# Patient Record
Sex: FEMALE | Race: WHITE | NOT HISPANIC OR LATINO
[De-identification: names, ages, dates, MRNs, and addresses within clinical notes are randomized per-mention and may not be internally consistent; named-entity substitution may affect disease eponyms.]

---

## 2021-04-14 ENCOUNTER — LABORATORY RESULT (OUTPATIENT)
Age: 76
End: 2021-04-14

## 2021-04-15 ENCOUNTER — TRANSCRIPTION ENCOUNTER (OUTPATIENT)
Age: 76
End: 2021-04-15

## 2021-04-15 ENCOUNTER — APPOINTMENT (OUTPATIENT)
Dept: DERMATOLOGY | Facility: CLINIC | Age: 76
End: 2021-04-15
Payer: MEDICARE

## 2021-04-15 ENCOUNTER — NON-APPOINTMENT (OUTPATIENT)
Age: 76
End: 2021-04-15

## 2021-04-15 VITALS — WEIGHT: 147 LBS | HEIGHT: 61 IN | BODY MASS INDEX: 27.75 KG/M2

## 2021-04-15 PROCEDURE — D0134: CPT

## 2021-04-15 PROCEDURE — 11102 TANGNTL BX SKIN SINGLE LES: CPT

## 2021-04-15 PROCEDURE — 99203 OFFICE O/P NEW LOW 30 MIN: CPT | Mod: 25

## 2021-04-29 ENCOUNTER — NON-APPOINTMENT (OUTPATIENT)
Age: 76
End: 2021-04-29

## 2021-05-10 ENCOUNTER — APPOINTMENT (OUTPATIENT)
Dept: DERMATOLOGY | Facility: CLINIC | Age: 76
End: 2021-05-10
Payer: MEDICARE

## 2021-05-10 PROCEDURE — 17110 DESTRUCTION B9 LES UP TO 14: CPT

## 2021-05-10 PROCEDURE — 99213 OFFICE O/P EST LOW 20 MIN: CPT | Mod: 25

## 2021-05-13 ENCOUNTER — APPOINTMENT (OUTPATIENT)
Dept: DERMATOLOGY | Facility: CLINIC | Age: 76
End: 2021-05-13

## 2022-01-01 ENCOUNTER — NON-APPOINTMENT (OUTPATIENT)
Age: 77
End: 2022-01-01

## 2022-01-01 ENCOUNTER — APPOINTMENT (OUTPATIENT)
Dept: ORTHOPEDIC SURGERY | Facility: CLINIC | Age: 77
End: 2022-01-01

## 2022-01-01 DIAGNOSIS — M47.26 OTHER SPONDYLOSIS WITH RADICULOPATHY, LUMBAR REGION: ICD-10-CM

## 2022-01-01 PROCEDURE — 72100 X-RAY EXAM L-S SPINE 2/3 VWS: CPT

## 2022-01-01 PROCEDURE — 99204 OFFICE O/P NEW MOD 45 MIN: CPT

## 2022-11-23 PROBLEM — M47.26 OSTEOARTHRITIS OF SPINE WITH RADICULOPATHY, LUMBAR REGION: Status: ACTIVE | Noted: 2022-01-01

## 2023-01-01 ENCOUNTER — RESULT REVIEW (OUTPATIENT)
Age: 78
End: 2023-01-01

## 2023-01-01 ENCOUNTER — APPOINTMENT (OUTPATIENT)
Dept: DERMATOLOGY | Facility: CLINIC | Age: 78
End: 2023-01-01

## 2023-01-01 ENCOUNTER — NON-APPOINTMENT (OUTPATIENT)
Age: 78
End: 2023-01-01

## 2023-01-01 ENCOUNTER — APPOINTMENT (OUTPATIENT)
Dept: INFUSION THERAPY | Facility: HOSPITAL | Age: 78
End: 2023-01-01

## 2023-01-01 ENCOUNTER — TRANSCRIPTION ENCOUNTER (OUTPATIENT)
Age: 78
End: 2023-01-01

## 2023-01-01 ENCOUNTER — INPATIENT (INPATIENT)
Facility: HOSPITAL | Age: 78
LOS: 2 days | Discharge: HOSPICE | DRG: 640 | End: 2023-10-06
Attending: STUDENT IN AN ORGANIZED HEALTH CARE EDUCATION/TRAINING PROGRAM | Admitting: HOSPITALIST
Payer: MEDICARE

## 2023-01-01 ENCOUNTER — APPOINTMENT (OUTPATIENT)
Dept: CT IMAGING | Facility: CLINIC | Age: 78
End: 2023-01-01

## 2023-01-01 ENCOUNTER — APPOINTMENT (OUTPATIENT)
Dept: OTOLARYNGOLOGY | Facility: CLINIC | Age: 78
End: 2023-01-01

## 2023-01-01 ENCOUNTER — APPOINTMENT (OUTPATIENT)
Dept: ULTRASOUND IMAGING | Facility: CLINIC | Age: 78
End: 2023-01-01
Payer: MEDICARE

## 2023-01-01 ENCOUNTER — APPOINTMENT (OUTPATIENT)
Dept: DERMATOLOGY | Facility: CLINIC | Age: 78
End: 2023-01-01
Payer: MEDICARE

## 2023-01-01 ENCOUNTER — APPOINTMENT (OUTPATIENT)
Dept: GASTROENTEROLOGY | Facility: HOSPITAL | Age: 78
End: 2023-01-01

## 2023-01-01 ENCOUNTER — INPATIENT (INPATIENT)
Facility: HOSPITAL | Age: 78
LOS: 7 days | Discharge: HOME HEALTH | DRG: 435 | End: 2023-09-28
Attending: STUDENT IN AN ORGANIZED HEALTH CARE EDUCATION/TRAINING PROGRAM | Admitting: STUDENT IN AN ORGANIZED HEALTH CARE EDUCATION/TRAINING PROGRAM
Payer: MEDICARE

## 2023-01-01 ENCOUNTER — APPOINTMENT (OUTPATIENT)
Dept: MULTI SPECIALTY CLINIC | Facility: CLINIC | Age: 78
End: 2023-01-01
Payer: MEDICARE

## 2023-01-01 ENCOUNTER — OUTPATIENT (OUTPATIENT)
Dept: OUTPATIENT SERVICES | Facility: HOSPITAL | Age: 78
LOS: 1 days | End: 2023-01-01
Payer: MEDICARE

## 2023-01-01 ENCOUNTER — APPOINTMENT (OUTPATIENT)
Dept: HEMATOLOGY ONCOLOGY | Facility: CLINIC | Age: 78
End: 2023-01-01
Payer: MEDICARE

## 2023-01-01 ENCOUNTER — OUTPATIENT (OUTPATIENT)
Dept: OUTPATIENT SERVICES | Facility: HOSPITAL | Age: 78
LOS: 1 days | Discharge: HOME | End: 2023-01-01
Payer: MEDICARE

## 2023-01-01 ENCOUNTER — APPOINTMENT (OUTPATIENT)
Dept: PAIN MANAGEMENT | Facility: CLINIC | Age: 78
End: 2023-01-01
Payer: MEDICARE

## 2023-01-01 ENCOUNTER — APPOINTMENT (OUTPATIENT)
Dept: RADIATION ONCOLOGY | Facility: CLINIC | Age: 78
End: 2023-01-01

## 2023-01-01 VITALS
HEIGHT: 62 IN | HEART RATE: 63 BPM | SYSTOLIC BLOOD PRESSURE: 157 MMHG | TEMPERATURE: 97 F | OXYGEN SATURATION: 96 % | RESPIRATION RATE: 18 BRPM | WEIGHT: 121.03 LBS | DIASTOLIC BLOOD PRESSURE: 76 MMHG

## 2023-01-01 VITALS
SYSTOLIC BLOOD PRESSURE: 107 MMHG | HEART RATE: 72 BPM | DIASTOLIC BLOOD PRESSURE: 73 MMHG | RESPIRATION RATE: 16 BRPM | BODY MASS INDEX: 22.93 KG/M2 | OXYGEN SATURATION: 96 % | WEIGHT: 121.46 LBS | TEMPERATURE: 97.2 F

## 2023-01-01 VITALS
BODY MASS INDEX: 24.43 KG/M2 | SYSTOLIC BLOOD PRESSURE: 107 MMHG | DIASTOLIC BLOOD PRESSURE: 61 MMHG | OXYGEN SATURATION: 98 % | HEART RATE: 67 BPM | RESPIRATION RATE: 16 BRPM | TEMPERATURE: 98.4 F | WEIGHT: 129.41 LBS | HEIGHT: 61.02 IN

## 2023-01-01 VITALS
OXYGEN SATURATION: 96 % | HEART RATE: 56 BPM | TEMPERATURE: 96 F | SYSTOLIC BLOOD PRESSURE: 128 MMHG | DIASTOLIC BLOOD PRESSURE: 60 MMHG | WEIGHT: 128.09 LBS | HEIGHT: 62 IN | RESPIRATION RATE: 26 BRPM

## 2023-01-01 VITALS
SYSTOLIC BLOOD PRESSURE: 163 MMHG | RESPIRATION RATE: 18 BRPM | DIASTOLIC BLOOD PRESSURE: 70 MMHG | HEART RATE: 57 BPM | TEMPERATURE: 99 F | OXYGEN SATURATION: 95 %

## 2023-01-01 VITALS
RESPIRATION RATE: 18 BRPM | TEMPERATURE: 97 F | DIASTOLIC BLOOD PRESSURE: 66 MMHG | HEART RATE: 59 BPM | OXYGEN SATURATION: 95 % | SYSTOLIC BLOOD PRESSURE: 142 MMHG

## 2023-01-01 VITALS
WEIGHT: 125 LBS | RESPIRATION RATE: 18 BRPM | TEMPERATURE: 98 F | OXYGEN SATURATION: 96 % | HEART RATE: 96 BPM | SYSTOLIC BLOOD PRESSURE: 124 MMHG | HEIGHT: 62 IN | DIASTOLIC BLOOD PRESSURE: 50 MMHG

## 2023-01-01 VITALS
HEART RATE: 54 BPM | RESPIRATION RATE: 18 BRPM | SYSTOLIC BLOOD PRESSURE: 121 MMHG | OXYGEN SATURATION: 97 % | DIASTOLIC BLOOD PRESSURE: 71 MMHG

## 2023-01-01 DIAGNOSIS — C25.9 MALIGNANT NEOPLASM OF PANCREAS, UNSPECIFIED: ICD-10-CM

## 2023-01-01 DIAGNOSIS — C79.51 SECONDARY MALIGNANT NEOPLASM OF BONE: ICD-10-CM

## 2023-01-01 DIAGNOSIS — Z86.79 PERSONAL HISTORY OF OTHER DISEASES OF THE CIRCULATORY SYSTEM: ICD-10-CM

## 2023-01-01 DIAGNOSIS — Z87.891 PERSONAL HISTORY OF NICOTINE DEPENDENCE: ICD-10-CM

## 2023-01-01 DIAGNOSIS — I82.409 ACUTE EMBOLISM AND THROMBOSIS OF UNSPECIFIED DEEP VEINS OF UNSPECIFIED LOWER EXTREMITY: ICD-10-CM

## 2023-01-01 DIAGNOSIS — K86.89 OTHER SPECIFIED DISEASES OF PANCREAS: ICD-10-CM

## 2023-01-01 DIAGNOSIS — Z80.1 FAMILY HISTORY OF MALIGNANT NEOPLASM OF TRACHEA, BRONCHUS AND LUNG: ICD-10-CM

## 2023-01-01 DIAGNOSIS — R10.9 UNSPECIFIED ABDOMINAL PAIN: ICD-10-CM

## 2023-01-01 DIAGNOSIS — E11.9 TYPE 2 DIABETES MELLITUS WITHOUT COMPLICATIONS: ICD-10-CM

## 2023-01-01 DIAGNOSIS — E83.52 HYPERCALCEMIA: ICD-10-CM

## 2023-01-01 DIAGNOSIS — Z86.59 PERSONAL HISTORY OF OTHER MENTAL AND BEHAVIORAL DISORDERS: ICD-10-CM

## 2023-01-01 DIAGNOSIS — L85.3 XEROSIS CUTIS: ICD-10-CM

## 2023-01-01 DIAGNOSIS — G89.3 NEOPLASM RELATED PAIN (ACUTE) (CHRONIC): ICD-10-CM

## 2023-01-01 DIAGNOSIS — K26.9 DUODENAL ULCER, UNSPECIFIED AS ACUTE OR CHRONIC, W/OUT HEMORRHAGE OR PERFORATION: ICD-10-CM

## 2023-01-01 DIAGNOSIS — R73.9 HYPERGLYCEMIA, UNSPECIFIED: ICD-10-CM

## 2023-01-01 DIAGNOSIS — J18.9 PNEUMONIA, UNSPECIFIED ORGANISM: ICD-10-CM

## 2023-01-01 DIAGNOSIS — Z29.9 ENCOUNTER FOR PROPHYLACTIC MEASURES, UNSPECIFIED: ICD-10-CM

## 2023-01-01 DIAGNOSIS — E86.0 DEHYDRATION: ICD-10-CM

## 2023-01-01 DIAGNOSIS — L30.9 DERMATITIS, UNSPECIFIED: ICD-10-CM

## 2023-01-01 DIAGNOSIS — R11.2 NAUSEA WITH VOMITING, UNSPECIFIED: ICD-10-CM

## 2023-01-01 DIAGNOSIS — R79.89 OTHER SPECIFIED ABNORMAL FINDINGS OF BLOOD CHEMISTRY: ICD-10-CM

## 2023-01-01 DIAGNOSIS — D64.9 ANEMIA, UNSPECIFIED: ICD-10-CM

## 2023-01-01 DIAGNOSIS — F41.9 ANXIETY DISORDER, UNSPECIFIED: ICD-10-CM

## 2023-01-01 DIAGNOSIS — I10 ESSENTIAL (PRIMARY) HYPERTENSION: ICD-10-CM

## 2023-01-01 DIAGNOSIS — R53.2 FUNCTIONAL QUADRIPLEGIA: ICD-10-CM

## 2023-01-01 DIAGNOSIS — Z00.00 ENCOUNTER FOR GENERAL ADULT MEDICAL EXAMINATION WITHOUT ABNORMAL FINDINGS: ICD-10-CM

## 2023-01-01 DIAGNOSIS — R52 PAIN, UNSPECIFIED: ICD-10-CM

## 2023-01-01 DIAGNOSIS — Z86.39 PERSONAL HISTORY OF OTHER ENDOCRINE, NUTRITIONAL AND METABOLIC DISEASE: ICD-10-CM

## 2023-01-01 DIAGNOSIS — L82.1 OTHER SEBORRHEIC KERATOSIS: ICD-10-CM

## 2023-01-01 DIAGNOSIS — Z71.89 OTHER SPECIFIED COUNSELING: ICD-10-CM

## 2023-01-01 DIAGNOSIS — L91.8 OTHER HYPERTROPHIC DISORDERS OF THE SKIN: ICD-10-CM

## 2023-01-01 DIAGNOSIS — Z51.5 ENCOUNTER FOR PALLIATIVE CARE: ICD-10-CM

## 2023-01-01 DIAGNOSIS — Z78.9 OTHER SPECIFIED HEALTH STATUS: ICD-10-CM

## 2023-01-01 LAB
A1C WITH ESTIMATED AVERAGE GLUCOSE RESULT: 7.7 % — HIGH (ref 4–5.6)
ALBUMIN SERPL ELPH-MCNC: 2.6 G/DL — LOW (ref 3.3–5)
ALBUMIN SERPL ELPH-MCNC: 3 G/DL — LOW (ref 3.3–5)
ALBUMIN SERPL ELPH-MCNC: 3 G/DL — LOW (ref 3.3–5)
ALBUMIN SERPL ELPH-MCNC: 3.1 G/DL — LOW (ref 3.3–5)
ALBUMIN SERPL ELPH-MCNC: 3.4 G/DL — SIGNIFICANT CHANGE UP (ref 3.3–5)
ALBUMIN SERPL ELPH-MCNC: 3.7 G/DL — SIGNIFICANT CHANGE UP (ref 3.3–5)
ALBUMIN SERPL ELPH-MCNC: 4.4 G/DL
ALP BLD-CCNC: 570 U/L
ALP SERPL-CCNC: 259 U/L — HIGH (ref 40–120)
ALP SERPL-CCNC: 271 U/L — HIGH (ref 40–120)
ALP SERPL-CCNC: 274 U/L — HIGH (ref 40–120)
ALP SERPL-CCNC: 342 U/L — HIGH (ref 40–120)
ALP SERPL-CCNC: 363 U/L — HIGH (ref 40–120)
ALP SERPL-CCNC: 376 U/L — HIGH (ref 40–120)
ALP SERPL-CCNC: 392 U/L — HIGH (ref 40–120)
ALP SERPL-CCNC: 435 U/L — HIGH (ref 40–120)
ALP SERPL-CCNC: 498 U/L — HIGH (ref 40–120)
ALT FLD-CCNC: 159 U/L — HIGH (ref 10–45)
ALT FLD-CCNC: 213 U/L — HIGH (ref 10–45)
ALT FLD-CCNC: 216 U/L — HIGH (ref 10–45)
ALT FLD-CCNC: 245 U/L — HIGH (ref 10–45)
ALT FLD-CCNC: 308 U/L — HIGH (ref 10–45)
ALT FLD-CCNC: 355 U/L — HIGH (ref 10–45)
ALT FLD-CCNC: 73 U/L — HIGH (ref 10–45)
ALT FLD-CCNC: 75 U/L — HIGH (ref 10–45)
ALT FLD-CCNC: 92 U/L — HIGH (ref 10–45)
ALT SERPL-CCNC: 628 U/L
ANION GAP SERPL CALC-SCNC: 10 MMOL/L — SIGNIFICANT CHANGE UP (ref 5–17)
ANION GAP SERPL CALC-SCNC: 11 MMOL/L — SIGNIFICANT CHANGE UP (ref 5–17)
ANION GAP SERPL CALC-SCNC: 11 MMOL/L — SIGNIFICANT CHANGE UP (ref 5–17)
ANION GAP SERPL CALC-SCNC: 12 MMOL/L — SIGNIFICANT CHANGE UP (ref 5–17)
ANION GAP SERPL CALC-SCNC: 12 MMOL/L — SIGNIFICANT CHANGE UP (ref 5–17)
ANION GAP SERPL CALC-SCNC: 14 MMOL/L
ANION GAP SERPL CALC-SCNC: 14 MMOL/L — SIGNIFICANT CHANGE UP (ref 5–17)
ANION GAP SERPL CALC-SCNC: 14 MMOL/L — SIGNIFICANT CHANGE UP (ref 5–17)
ANION GAP SERPL CALC-SCNC: 8 MMOL/L — SIGNIFICANT CHANGE UP (ref 5–17)
ANION GAP SERPL CALC-SCNC: 9 MMOL/L — SIGNIFICANT CHANGE UP (ref 5–17)
ANION GAP SERPL CALC-SCNC: 9 MMOL/L — SIGNIFICANT CHANGE UP (ref 5–17)
APTT BLD: 25.5 SEC — SIGNIFICANT CHANGE UP (ref 24.5–35.6)
APTT BLD: 32.7 SEC
APTT BLD: 35.4 SEC — SIGNIFICANT CHANGE UP (ref 24.5–35.6)
AST SERPL-CCNC: 109 U/L — HIGH (ref 10–40)
AST SERPL-CCNC: 110 U/L — HIGH (ref 10–40)
AST SERPL-CCNC: 147 U/L — HIGH (ref 10–40)
AST SERPL-CCNC: 163 U/L — HIGH (ref 10–40)
AST SERPL-CCNC: 327 U/L
AST SERPL-CCNC: 47 U/L — HIGH (ref 10–40)
AST SERPL-CCNC: 48 U/L — HIGH (ref 10–40)
AST SERPL-CCNC: 56 U/L — HIGH (ref 10–40)
AST SERPL-CCNC: 64 U/L — HIGH (ref 10–40)
AST SERPL-CCNC: 94 U/L — HIGH (ref 10–40)
BASOPHILS # BLD AUTO: 0 K/UL — SIGNIFICANT CHANGE UP (ref 0–0.2)
BASOPHILS # BLD AUTO: 0.03 K/UL — SIGNIFICANT CHANGE UP (ref 0–0.2)
BASOPHILS # BLD AUTO: 0.06 K/UL — SIGNIFICANT CHANGE UP (ref 0–0.2)
BASOPHILS # BLD AUTO: 0.08 K/UL — SIGNIFICANT CHANGE UP (ref 0–0.2)
BASOPHILS # BLD AUTO: 0.11 K/UL — SIGNIFICANT CHANGE UP (ref 0–0.2)
BASOPHILS # BLD AUTO: 0.13 K/UL — SIGNIFICANT CHANGE UP (ref 0–0.2)
BASOPHILS NFR BLD AUTO: 0 % — SIGNIFICANT CHANGE UP (ref 0–2)
BASOPHILS NFR BLD AUTO: 0.1 % — SIGNIFICANT CHANGE UP (ref 0–2)
BASOPHILS NFR BLD AUTO: 0.3 % — SIGNIFICANT CHANGE UP (ref 0–2)
BASOPHILS NFR BLD AUTO: 0.4 % — SIGNIFICANT CHANGE UP (ref 0–2)
BASOPHILS NFR BLD AUTO: 0.8 % — SIGNIFICANT CHANGE UP (ref 0–2)
BASOPHILS NFR BLD AUTO: 1 % — SIGNIFICANT CHANGE UP (ref 0–2)
BILIRUB SERPL-MCNC: 1.1 MG/DL
BILIRUB SERPL-MCNC: 2 MG/DL — HIGH (ref 0.2–1.2)
BILIRUB SERPL-MCNC: 2 MG/DL — HIGH (ref 0.2–1.2)
BILIRUB SERPL-MCNC: 2.3 MG/DL — HIGH (ref 0.2–1.2)
BILIRUB SERPL-MCNC: 2.8 MG/DL — HIGH (ref 0.2–1.2)
BILIRUB SERPL-MCNC: 4.3 MG/DL — HIGH (ref 0.2–1.2)
BILIRUB SERPL-MCNC: 4.4 MG/DL — HIGH (ref 0.2–1.2)
BILIRUB SERPL-MCNC: 4.5 MG/DL — HIGH (ref 0.2–1.2)
BILIRUB SERPL-MCNC: 5.1 MG/DL — HIGH (ref 0.2–1.2)
BILIRUB SERPL-MCNC: 5.5 MG/DL — HIGH (ref 0.2–1.2)
BLD GP AB SCN SERPL QL: NEGATIVE — SIGNIFICANT CHANGE UP
BLD GP AB SCN SERPL QL: NEGATIVE — SIGNIFICANT CHANGE UP
BUN SERPL-MCNC: 13 MG/DL — SIGNIFICANT CHANGE UP (ref 7–23)
BUN SERPL-MCNC: 14 MG/DL — SIGNIFICANT CHANGE UP (ref 7–23)
BUN SERPL-MCNC: 15 MG/DL — SIGNIFICANT CHANGE UP (ref 7–23)
BUN SERPL-MCNC: 16 MG/DL
BUN SERPL-MCNC: 16 MG/DL — SIGNIFICANT CHANGE UP (ref 7–23)
BUN SERPL-MCNC: 19 MG/DL — SIGNIFICANT CHANGE UP (ref 7–23)
BUN SERPL-MCNC: 21 MG/DL — SIGNIFICANT CHANGE UP (ref 7–23)
BUN SERPL-MCNC: 22 MG/DL — SIGNIFICANT CHANGE UP (ref 7–23)
BUN SERPL-MCNC: 24 MG/DL — HIGH (ref 7–23)
BUN SERPL-MCNC: 25 MG/DL — HIGH (ref 7–23)
BUN SERPL-MCNC: 26 MG/DL — HIGH (ref 7–23)
BUN SERPL-MCNC: 27 MG/DL — HIGH (ref 7–23)
CA-I BLD-SCNC: 1.72 MMOL/L — CRITICAL HIGH (ref 1.15–1.33)
CA-I BLD-SCNC: 1.78 MMOL/L — CRITICAL HIGH (ref 1.15–1.33)
CA-I BLD-SCNC: 1.99 MMOL/L — CRITICAL HIGH (ref 1.15–1.33)
CA-I BLD-SCNC: 2.32 MMOL/L — CRITICAL HIGH (ref 1.15–1.33)
CALCIUM SERPL-MCNC: 10.2 MG/DL — SIGNIFICANT CHANGE UP (ref 8.4–10.5)
CALCIUM SERPL-MCNC: 10.6 MG/DL — HIGH (ref 8.4–10.5)
CALCIUM SERPL-MCNC: 10.6 MG/DL — HIGH (ref 8.4–10.5)
CALCIUM SERPL-MCNC: 10.9 MG/DL — HIGH (ref 8.4–10.5)
CALCIUM SERPL-MCNC: 11.1 MG/DL
CALCIUM SERPL-MCNC: 11.1 MG/DL — HIGH (ref 8.4–10.5)
CALCIUM SERPL-MCNC: 11.1 MG/DL — HIGH (ref 8.4–10.5)
CALCIUM SERPL-MCNC: 11.6 MG/DL — HIGH (ref 8.4–10.5)
CALCIUM SERPL-MCNC: 11.8 MG/DL — HIGH (ref 8.4–10.5)
CALCIUM SERPL-MCNC: 11.9 MG/DL — HIGH (ref 8.4–10.5)
CALCIUM SERPL-MCNC: 12.3 MG/DL — HIGH (ref 8.4–10.5)
CALCIUM SERPL-MCNC: 12.9 MG/DL — HIGH (ref 8.4–10.5)
CALCIUM SERPL-MCNC: 13.1 MG/DL — CRITICAL HIGH (ref 8.4–10.5)
CALCIUM SERPL-MCNC: 14 MG/DL — CRITICAL HIGH (ref 8.4–10.5)
CALCIUM SERPL-MCNC: 14.9 MG/DL — CRITICAL HIGH (ref 8.4–10.5)
CALCIUM SERPL-MCNC: 15.8 MG/DL — CRITICAL HIGH (ref 8.4–10.5)
CALCIUM SERPL-MCNC: 16.2 MG/DL — CRITICAL HIGH (ref 8.4–10.5)
CALCIUM SERPL-MCNC: 17.1 MG/DL — CRITICAL HIGH (ref 8.4–10.5)
CANCER AG19-9 SERPL-ACNC: 2569 U/ML
CEA SERPL-MCNC: 27.5 NG/ML
CHLORIDE SERPL-SCNC: 100 MMOL/L — SIGNIFICANT CHANGE UP (ref 96–108)
CHLORIDE SERPL-SCNC: 101 MMOL/L — SIGNIFICANT CHANGE UP (ref 96–108)
CHLORIDE SERPL-SCNC: 93 MMOL/L — LOW (ref 96–108)
CHLORIDE SERPL-SCNC: 95 MMOL/L — LOW (ref 96–108)
CHLORIDE SERPL-SCNC: 96 MMOL/L
CHLORIDE SERPL-SCNC: 97 MMOL/L — SIGNIFICANT CHANGE UP (ref 96–108)
CHLORIDE SERPL-SCNC: 97 MMOL/L — SIGNIFICANT CHANGE UP (ref 96–108)
CHLORIDE SERPL-SCNC: 98 MMOL/L — SIGNIFICANT CHANGE UP (ref 96–108)
CHLORIDE SERPL-SCNC: 99 MMOL/L — SIGNIFICANT CHANGE UP (ref 96–108)
CO2 SERPL-SCNC: 21 MMOL/L — LOW (ref 22–31)
CO2 SERPL-SCNC: 22 MMOL/L — SIGNIFICANT CHANGE UP (ref 22–31)
CO2 SERPL-SCNC: 23 MMOL/L — SIGNIFICANT CHANGE UP (ref 22–31)
CO2 SERPL-SCNC: 23 MMOL/L — SIGNIFICANT CHANGE UP (ref 22–31)
CO2 SERPL-SCNC: 24 MMOL/L
CO2 SERPL-SCNC: 24 MMOL/L — SIGNIFICANT CHANGE UP (ref 22–31)
CO2 SERPL-SCNC: 25 MMOL/L — SIGNIFICANT CHANGE UP (ref 22–31)
CO2 SERPL-SCNC: 25 MMOL/L — SIGNIFICANT CHANGE UP (ref 22–31)
CO2 SERPL-SCNC: 26 MMOL/L — SIGNIFICANT CHANGE UP (ref 22–31)
CO2 SERPL-SCNC: 26 MMOL/L — SIGNIFICANT CHANGE UP (ref 22–31)
CO2 SERPL-SCNC: 27 MMOL/L — SIGNIFICANT CHANGE UP (ref 22–31)
CO2 SERPL-SCNC: 27 MMOL/L — SIGNIFICANT CHANGE UP (ref 22–31)
CO2 SERPL-SCNC: 28 MMOL/L — SIGNIFICANT CHANGE UP (ref 22–31)
CO2 SERPL-SCNC: 28 MMOL/L — SIGNIFICANT CHANGE UP (ref 22–31)
CO2 SERPL-SCNC: 30 MMOL/L — SIGNIFICANT CHANGE UP (ref 22–31)
CREAT SERPL-MCNC: 0.5 MG/DL — SIGNIFICANT CHANGE UP (ref 0.5–1.3)
CREAT SERPL-MCNC: 0.54 MG/DL — SIGNIFICANT CHANGE UP (ref 0.5–1.3)
CREAT SERPL-MCNC: 0.59 MG/DL — SIGNIFICANT CHANGE UP (ref 0.5–1.3)
CREAT SERPL-MCNC: 0.62 MG/DL — SIGNIFICANT CHANGE UP (ref 0.5–1.3)
CREAT SERPL-MCNC: 0.63 MG/DL — SIGNIFICANT CHANGE UP (ref 0.5–1.3)
CREAT SERPL-MCNC: 0.66 MG/DL — SIGNIFICANT CHANGE UP (ref 0.5–1.3)
CREAT SERPL-MCNC: 0.67 MG/DL
CREAT SERPL-MCNC: 0.7 MG/DL — SIGNIFICANT CHANGE UP (ref 0.5–1.3)
CREAT SERPL-MCNC: 0.71 MG/DL — SIGNIFICANT CHANGE UP (ref 0.5–1.3)
CREAT SERPL-MCNC: 0.72 MG/DL — SIGNIFICANT CHANGE UP (ref 0.5–1.3)
CREAT SERPL-MCNC: 0.73 MG/DL — SIGNIFICANT CHANGE UP (ref 0.5–1.3)
CREAT SERPL-MCNC: 0.74 MG/DL — SIGNIFICANT CHANGE UP (ref 0.5–1.3)
CREAT SERPL-MCNC: 0.78 MG/DL — SIGNIFICANT CHANGE UP (ref 0.5–1.3)
CREAT SERPL-MCNC: 0.8 MG/DL — SIGNIFICANT CHANGE UP (ref 0.5–1.3)
CREAT SERPL-MCNC: 0.85 MG/DL — SIGNIFICANT CHANGE UP (ref 0.5–1.3)
CREAT SERPL-MCNC: 0.96 MG/DL — SIGNIFICANT CHANGE UP (ref 0.5–1.3)
CULTURE RESULTS: SIGNIFICANT CHANGE UP
CULTURE RESULTS: SIGNIFICANT CHANGE UP
EGFR: 61 ML/MIN/1.73M2 — SIGNIFICANT CHANGE UP
EGFR: 71 ML/MIN/1.73M2 — SIGNIFICANT CHANGE UP
EGFR: 76 ML/MIN/1.73M2 — SIGNIFICANT CHANGE UP
EGFR: 79 ML/MIN/1.73M2 — SIGNIFICANT CHANGE UP
EGFR: 84 ML/MIN/1.73M2 — SIGNIFICANT CHANGE UP
EGFR: 85 ML/MIN/1.73M2 — SIGNIFICANT CHANGE UP
EGFR: 87 ML/MIN/1.73M2 — SIGNIFICANT CHANGE UP
EGFR: 88 ML/MIN/1.73M2 — SIGNIFICANT CHANGE UP
EGFR: 90 ML/MIN/1.73M2 — SIGNIFICANT CHANGE UP
EGFR: 91 ML/MIN/1.73M2
EGFR: 91 ML/MIN/1.73M2 — SIGNIFICANT CHANGE UP
EGFR: 92 ML/MIN/1.73M2 — SIGNIFICANT CHANGE UP
EGFR: 92 ML/MIN/1.73M2 — SIGNIFICANT CHANGE UP
EGFR: 93 ML/MIN/1.73M2 — SIGNIFICANT CHANGE UP
EGFR: 95 ML/MIN/1.73M2 — SIGNIFICANT CHANGE UP
EGFR: 97 ML/MIN/1.73M2 — SIGNIFICANT CHANGE UP
EOSINOPHIL # BLD AUTO: 0.19 K/UL — SIGNIFICANT CHANGE UP (ref 0–0.5)
EOSINOPHIL # BLD AUTO: 0.19 K/UL — SIGNIFICANT CHANGE UP (ref 0–0.5)
EOSINOPHIL # BLD AUTO: 0.22 K/UL — SIGNIFICANT CHANGE UP (ref 0–0.5)
EOSINOPHIL # BLD AUTO: 0.23 K/UL — SIGNIFICANT CHANGE UP (ref 0–0.5)
EOSINOPHIL # BLD AUTO: 0.35 K/UL — SIGNIFICANT CHANGE UP (ref 0–0.5)
EOSINOPHIL # BLD AUTO: 0.45 K/UL — SIGNIFICANT CHANGE UP (ref 0–0.5)
EOSINOPHIL NFR BLD AUTO: 0.9 % — SIGNIFICANT CHANGE UP (ref 0–6)
EOSINOPHIL NFR BLD AUTO: 0.9 % — SIGNIFICANT CHANGE UP (ref 0–6)
EOSINOPHIL NFR BLD AUTO: 1 % — SIGNIFICANT CHANGE UP (ref 0–6)
EOSINOPHIL NFR BLD AUTO: 1 % — SIGNIFICANT CHANGE UP (ref 0–6)
EOSINOPHIL NFR BLD AUTO: 2.7 % — SIGNIFICANT CHANGE UP (ref 0–6)
EOSINOPHIL NFR BLD AUTO: 3.4 % — SIGNIFICANT CHANGE UP (ref 0–6)
ESTIMATED AVERAGE GLUCOSE: 174 MG/DL — HIGH (ref 68–114)
FERRITIN SERPL-MCNC: 451 NG/ML — HIGH (ref 13–330)
FOLATE SERPL-MCNC: 10.2 NG/ML — SIGNIFICANT CHANGE UP
GLUCOSE BLDC GLUCOMTR-MCNC: 100 MG/DL — HIGH (ref 70–99)
GLUCOSE BLDC GLUCOMTR-MCNC: 106 MG/DL — HIGH (ref 70–99)
GLUCOSE BLDC GLUCOMTR-MCNC: 107 MG/DL — HIGH (ref 70–99)
GLUCOSE BLDC GLUCOMTR-MCNC: 110 MG/DL — HIGH (ref 70–99)
GLUCOSE BLDC GLUCOMTR-MCNC: 110 MG/DL — HIGH (ref 70–99)
GLUCOSE BLDC GLUCOMTR-MCNC: 116 MG/DL — HIGH (ref 70–99)
GLUCOSE BLDC GLUCOMTR-MCNC: 117 MG/DL — HIGH (ref 70–99)
GLUCOSE BLDC GLUCOMTR-MCNC: 119 MG/DL — HIGH (ref 70–99)
GLUCOSE BLDC GLUCOMTR-MCNC: 121 MG/DL — HIGH (ref 70–99)
GLUCOSE BLDC GLUCOMTR-MCNC: 121 MG/DL — HIGH (ref 70–99)
GLUCOSE BLDC GLUCOMTR-MCNC: 125 MG/DL — HIGH (ref 70–99)
GLUCOSE BLDC GLUCOMTR-MCNC: 127 MG/DL — HIGH (ref 70–99)
GLUCOSE BLDC GLUCOMTR-MCNC: 128 MG/DL — HIGH (ref 70–99)
GLUCOSE BLDC GLUCOMTR-MCNC: 128 MG/DL — HIGH (ref 70–99)
GLUCOSE BLDC GLUCOMTR-MCNC: 129 MG/DL — HIGH (ref 70–99)
GLUCOSE BLDC GLUCOMTR-MCNC: 129 MG/DL — HIGH (ref 70–99)
GLUCOSE BLDC GLUCOMTR-MCNC: 133 MG/DL — HIGH (ref 70–99)
GLUCOSE BLDC GLUCOMTR-MCNC: 135 MG/DL — HIGH (ref 70–99)
GLUCOSE BLDC GLUCOMTR-MCNC: 137 MG/DL — HIGH (ref 70–99)
GLUCOSE BLDC GLUCOMTR-MCNC: 142 MG/DL — HIGH (ref 70–99)
GLUCOSE BLDC GLUCOMTR-MCNC: 142 MG/DL — HIGH (ref 70–99)
GLUCOSE BLDC GLUCOMTR-MCNC: 143 MG/DL — HIGH (ref 70–99)
GLUCOSE BLDC GLUCOMTR-MCNC: 148 MG/DL — HIGH (ref 70–99)
GLUCOSE BLDC GLUCOMTR-MCNC: 148 MG/DL — HIGH (ref 70–99)
GLUCOSE BLDC GLUCOMTR-MCNC: 152 MG/DL — HIGH (ref 70–99)
GLUCOSE BLDC GLUCOMTR-MCNC: 165 MG/DL — HIGH (ref 70–99)
GLUCOSE BLDC GLUCOMTR-MCNC: 166 MG/DL — HIGH (ref 70–99)
GLUCOSE BLDC GLUCOMTR-MCNC: 167 MG/DL — HIGH (ref 70–99)
GLUCOSE BLDC GLUCOMTR-MCNC: 169 MG/DL — HIGH (ref 70–99)
GLUCOSE BLDC GLUCOMTR-MCNC: 171 MG/DL — HIGH (ref 70–99)
GLUCOSE BLDC GLUCOMTR-MCNC: 172 MG/DL — HIGH (ref 70–99)
GLUCOSE BLDC GLUCOMTR-MCNC: 174 MG/DL — HIGH (ref 70–99)
GLUCOSE BLDC GLUCOMTR-MCNC: 175 MG/DL — HIGH (ref 70–99)
GLUCOSE BLDC GLUCOMTR-MCNC: 178 MG/DL — HIGH (ref 70–99)
GLUCOSE BLDC GLUCOMTR-MCNC: 179 MG/DL — HIGH (ref 70–99)
GLUCOSE BLDC GLUCOMTR-MCNC: 206 MG/DL — HIGH (ref 70–99)
GLUCOSE BLDC GLUCOMTR-MCNC: 217 MG/DL — HIGH (ref 70–99)
GLUCOSE BLDC GLUCOMTR-MCNC: 87 MG/DL — SIGNIFICANT CHANGE UP (ref 70–99)
GLUCOSE SERPL-MCNC: 102 MG/DL — HIGH (ref 70–99)
GLUCOSE SERPL-MCNC: 104 MG/DL — HIGH (ref 70–99)
GLUCOSE SERPL-MCNC: 106 MG/DL — HIGH (ref 70–99)
GLUCOSE SERPL-MCNC: 109 MG/DL — HIGH (ref 70–99)
GLUCOSE SERPL-MCNC: 110 MG/DL — HIGH (ref 70–99)
GLUCOSE SERPL-MCNC: 113 MG/DL — HIGH (ref 70–99)
GLUCOSE SERPL-MCNC: 120 MG/DL — HIGH (ref 70–99)
GLUCOSE SERPL-MCNC: 123 MG/DL — HIGH (ref 70–99)
GLUCOSE SERPL-MCNC: 126 MG/DL — HIGH (ref 70–99)
GLUCOSE SERPL-MCNC: 127 MG/DL — HIGH (ref 70–99)
GLUCOSE SERPL-MCNC: 134 MG/DL
GLUCOSE SERPL-MCNC: 134 MG/DL — HIGH (ref 70–99)
GLUCOSE SERPL-MCNC: 137 MG/DL — HIGH (ref 70–99)
GLUCOSE SERPL-MCNC: 138 MG/DL — HIGH (ref 70–99)
GLUCOSE SERPL-MCNC: 142 MG/DL — HIGH (ref 70–99)
GLUCOSE SERPL-MCNC: 147 MG/DL — HIGH (ref 70–99)
GLUCOSE SERPL-MCNC: 157 MG/DL — HIGH (ref 70–99)
GLUCOSE SERPL-MCNC: 223 MG/DL — HIGH (ref 70–99)
HAV IGM SER QL: NONREACTIVE
HBV CORE IGG+IGM SER QL: NONREACTIVE
HBV CORE IGM SER QL: NONREACTIVE
HBV SURFACE AB SER QL: NONREACTIVE
HBV SURFACE AG SER QL: NONREACTIVE
HCT VFR BLD CALC: 27.8 % — LOW (ref 34.5–45)
HCT VFR BLD CALC: 28.3 % — LOW (ref 34.5–45)
HCT VFR BLD CALC: 29.1 % — LOW (ref 34.5–45)
HCT VFR BLD CALC: 29.6 % — LOW (ref 34.5–45)
HCT VFR BLD CALC: 29.6 % — LOW (ref 34.5–45)
HCT VFR BLD CALC: 29.9 % — LOW (ref 34.5–45)
HCT VFR BLD CALC: 29.9 % — LOW (ref 34.5–45)
HCT VFR BLD CALC: 30.9 % — LOW (ref 34.5–45)
HCT VFR BLD CALC: 31.4 % — LOW (ref 34.5–45)
HCT VFR BLD CALC: 31.5 % — LOW (ref 34.5–45)
HCT VFR BLD CALC: 32 % — LOW (ref 34.5–45)
HCT VFR BLD CALC: 33.2 % — LOW (ref 34.5–45)
HCT VFR BLD CALC: 34.7 % — SIGNIFICANT CHANGE UP (ref 34.5–45)
HCT VFR BLD CALC: 38.6 % — SIGNIFICANT CHANGE UP (ref 34.5–45)
HCV AB S/CO SERPL IA: 0.07 S/CO — SIGNIFICANT CHANGE UP (ref 0–0.99)
HCV AB SER QL: NONREACTIVE
HCV AB SERPL-IMP: SIGNIFICANT CHANGE UP
HCV S/CO RATIO: 0.08 S/CO
HGB BLD-MCNC: 10 G/DL — LOW (ref 11.5–15.5)
HGB BLD-MCNC: 10.4 G/DL — LOW (ref 11.5–15.5)
HGB BLD-MCNC: 10.5 G/DL — LOW (ref 11.5–15.5)
HGB BLD-MCNC: 10.5 G/DL — LOW (ref 11.5–15.5)
HGB BLD-MCNC: 10.6 G/DL — LOW (ref 11.5–15.5)
HGB BLD-MCNC: 10.9 G/DL — LOW (ref 11.5–15.5)
HGB BLD-MCNC: 11.6 G/DL — SIGNIFICANT CHANGE UP (ref 11.5–15.5)
HGB BLD-MCNC: 13.1 G/DL — SIGNIFICANT CHANGE UP (ref 11.5–15.5)
HGB BLD-MCNC: 9.3 G/DL — LOW (ref 11.5–15.5)
HGB BLD-MCNC: 9.5 G/DL — LOW (ref 11.5–15.5)
HGB BLD-MCNC: 9.6 G/DL — LOW (ref 11.5–15.5)
HGB BLD-MCNC: 9.7 G/DL — LOW (ref 11.5–15.5)
HGB BLD-MCNC: 9.8 G/DL — LOW (ref 11.5–15.5)
HGB BLD-MCNC: 9.9 G/DL — LOW (ref 11.5–15.5)
IMM GRANULOCYTES NFR BLD AUTO: 0.5 % — SIGNIFICANT CHANGE UP (ref 0–0.9)
IMM GRANULOCYTES NFR BLD AUTO: 0.6 % — SIGNIFICANT CHANGE UP (ref 0–0.9)
IMM GRANULOCYTES NFR BLD AUTO: 0.6 % — SIGNIFICANT CHANGE UP (ref 0–0.9)
IMM GRANULOCYTES NFR BLD AUTO: 0.7 % — SIGNIFICANT CHANGE UP (ref 0–0.9)
IMM GRANULOCYTES NFR BLD AUTO: 0.9 % — SIGNIFICANT CHANGE UP (ref 0–0.9)
INR BLD: 1.07 RATIO — SIGNIFICANT CHANGE UP (ref 0.85–1.18)
INR BLD: 1.15 RATIO — SIGNIFICANT CHANGE UP (ref 0.85–1.18)
INR BLD: 1.36 RATIO — HIGH (ref 0.85–1.18)
INR BLD: 1.4 RATIO — HIGH (ref 0.85–1.18)
INR PPP: 0.97 RATIO
IRON SATN MFR SERPL: 13 % — LOW (ref 14–50)
IRON SATN MFR SERPL: 29 UG/DL — LOW (ref 30–160)
LYMPHOCYTES # BLD AUTO: 1.46 K/UL — SIGNIFICANT CHANGE UP (ref 1–3.3)
LYMPHOCYTES # BLD AUTO: 1.47 K/UL — SIGNIFICANT CHANGE UP (ref 1–3.3)
LYMPHOCYTES # BLD AUTO: 1.59 K/UL — SIGNIFICANT CHANGE UP (ref 1–3.3)
LYMPHOCYTES # BLD AUTO: 1.62 K/UL — SIGNIFICANT CHANGE UP (ref 1–3.3)
LYMPHOCYTES # BLD AUTO: 1.82 K/UL — SIGNIFICANT CHANGE UP (ref 1–3.3)
LYMPHOCYTES # BLD AUTO: 13.8 % — SIGNIFICANT CHANGE UP (ref 13–44)
LYMPHOCYTES # BLD AUTO: 2.71 K/UL — SIGNIFICANT CHANGE UP (ref 1–3.3)
LYMPHOCYTES # BLD AUTO: 20.5 % — SIGNIFICANT CHANGE UP (ref 13–44)
LYMPHOCYTES # BLD AUTO: 6.9 % — LOW (ref 13–44)
LYMPHOCYTES # BLD AUTO: 7 % — LOW (ref 13–44)
LYMPHOCYTES # BLD AUTO: 7.2 % — LOW (ref 13–44)
LYMPHOCYTES # BLD AUTO: 7.5 % — LOW (ref 13–44)
MAGNESIUM SERPL-MCNC: 1.5 MG/DL — LOW (ref 1.6–2.6)
MAGNESIUM SERPL-MCNC: 1.6 MG/DL — SIGNIFICANT CHANGE UP (ref 1.6–2.6)
MAGNESIUM SERPL-MCNC: 1.6 MG/DL — SIGNIFICANT CHANGE UP (ref 1.6–2.6)
MAGNESIUM SERPL-MCNC: 1.7 MG/DL — SIGNIFICANT CHANGE UP (ref 1.6–2.6)
MAGNESIUM SERPL-MCNC: 1.7 MG/DL — SIGNIFICANT CHANGE UP (ref 1.6–2.6)
MAGNESIUM SERPL-MCNC: 1.9 MG/DL — SIGNIFICANT CHANGE UP (ref 1.6–2.6)
MANUAL SMEAR VERIFICATION: SIGNIFICANT CHANGE UP
MCHC RBC-ENTMCNC: 28.9 PG — SIGNIFICANT CHANGE UP (ref 27–34)
MCHC RBC-ENTMCNC: 29.2 PG — SIGNIFICANT CHANGE UP (ref 27–34)
MCHC RBC-ENTMCNC: 29.3 PG — SIGNIFICANT CHANGE UP (ref 27–34)
MCHC RBC-ENTMCNC: 29.4 PG — SIGNIFICANT CHANGE UP (ref 27–34)
MCHC RBC-ENTMCNC: 29.4 PG — SIGNIFICANT CHANGE UP (ref 27–34)
MCHC RBC-ENTMCNC: 29.6 PG — SIGNIFICANT CHANGE UP (ref 27–34)
MCHC RBC-ENTMCNC: 29.8 PG — SIGNIFICANT CHANGE UP (ref 27–34)
MCHC RBC-ENTMCNC: 29.9 PG — SIGNIFICANT CHANGE UP (ref 27–34)
MCHC RBC-ENTMCNC: 30 PG — SIGNIFICANT CHANGE UP (ref 27–34)
MCHC RBC-ENTMCNC: 30.1 PG — SIGNIFICANT CHANGE UP (ref 27–34)
MCHC RBC-ENTMCNC: 30.5 PG — SIGNIFICANT CHANGE UP (ref 27–34)
MCHC RBC-ENTMCNC: 32.4 GM/DL — SIGNIFICANT CHANGE UP (ref 32–36)
MCHC RBC-ENTMCNC: 32.8 G/DL — SIGNIFICANT CHANGE UP (ref 32–36)
MCHC RBC-ENTMCNC: 32.8 GM/DL — SIGNIFICANT CHANGE UP (ref 32–36)
MCHC RBC-ENTMCNC: 33 GM/DL — SIGNIFICANT CHANGE UP (ref 32–36)
MCHC RBC-ENTMCNC: 33.1 GM/DL — SIGNIFICANT CHANGE UP (ref 32–36)
MCHC RBC-ENTMCNC: 33.3 G/DL — SIGNIFICANT CHANGE UP (ref 32–36)
MCHC RBC-ENTMCNC: 33.4 GM/DL — SIGNIFICANT CHANGE UP (ref 32–36)
MCHC RBC-ENTMCNC: 33.5 GM/DL — SIGNIFICANT CHANGE UP (ref 32–36)
MCHC RBC-ENTMCNC: 33.6 GM/DL — SIGNIFICANT CHANGE UP (ref 32–36)
MCHC RBC-ENTMCNC: 33.7 GM/DL — SIGNIFICANT CHANGE UP (ref 32–36)
MCHC RBC-ENTMCNC: 33.8 GM/DL — SIGNIFICANT CHANGE UP (ref 32–36)
MCHC RBC-ENTMCNC: 33.9 G/DL — SIGNIFICANT CHANGE UP (ref 32–36)
MCV RBC AUTO: 86.8 FL — SIGNIFICANT CHANGE UP (ref 80–100)
MCV RBC AUTO: 87.6 FL — SIGNIFICANT CHANGE UP (ref 80–100)
MCV RBC AUTO: 88.1 FL — SIGNIFICANT CHANGE UP (ref 80–100)
MCV RBC AUTO: 88.6 FL — SIGNIFICANT CHANGE UP (ref 80–100)
MCV RBC AUTO: 89 FL — SIGNIFICANT CHANGE UP (ref 80–100)
MCV RBC AUTO: 89.2 FL — SIGNIFICANT CHANGE UP (ref 80–100)
MCV RBC AUTO: 89.4 FL — SIGNIFICANT CHANGE UP (ref 80–100)
MCV RBC AUTO: 89.8 FL — SIGNIFICANT CHANGE UP (ref 80–100)
MCV RBC AUTO: 90 FL — SIGNIFICANT CHANGE UP (ref 80–100)
MCV RBC AUTO: 90.3 FL — SIGNIFICANT CHANGE UP (ref 80–100)
MCV RBC AUTO: 90.6 FL — SIGNIFICANT CHANGE UP (ref 80–100)
MCV RBC AUTO: 90.9 FL — SIGNIFICANT CHANGE UP (ref 80–100)
MISCELLANEOUS TEST: NORMAL
MONOCYTES # BLD AUTO: 1.02 K/UL — HIGH (ref 0–0.9)
MONOCYTES # BLD AUTO: 1.08 K/UL — HIGH (ref 0–0.9)
MONOCYTES # BLD AUTO: 1.57 K/UL — HIGH (ref 0–0.9)
MONOCYTES # BLD AUTO: 1.66 K/UL — HIGH (ref 0–0.9)
MONOCYTES # BLD AUTO: 1.68 K/UL — HIGH (ref 0–0.9)
MONOCYTES # BLD AUTO: 2.03 K/UL — HIGH (ref 0–0.9)
MONOCYTES NFR BLD AUTO: 7.3 % — SIGNIFICANT CHANGE UP (ref 2–14)
MONOCYTES NFR BLD AUTO: 7.6 % — SIGNIFICANT CHANGE UP (ref 2–14)
MONOCYTES NFR BLD AUTO: 7.7 % — SIGNIFICANT CHANGE UP (ref 2–14)
MONOCYTES NFR BLD AUTO: 7.9 % — SIGNIFICANT CHANGE UP (ref 2–14)
MONOCYTES NFR BLD AUTO: 8.2 % — SIGNIFICANT CHANGE UP (ref 2–14)
MONOCYTES NFR BLD AUTO: 9.6 % — SIGNIFICANT CHANGE UP (ref 2–14)
MRSA PCR RESULT.: SIGNIFICANT CHANGE UP
NEUTROPHILS # BLD AUTO: 17.45 K/UL — HIGH (ref 1.8–7.4)
NEUTROPHILS # BLD AUTO: 17.45 K/UL — HIGH (ref 1.8–7.4)
NEUTROPHILS # BLD AUTO: 17.91 K/UL — HIGH (ref 1.8–7.4)
NEUTROPHILS # BLD AUTO: 18.35 K/UL — HIGH (ref 1.8–7.4)
NEUTROPHILS # BLD AUTO: 8.79 K/UL — HIGH (ref 1.8–7.4)
NEUTROPHILS # BLD AUTO: 9.81 K/UL — HIGH (ref 1.8–7.4)
NEUTROPHILS NFR BLD AUTO: 66.4 % — SIGNIFICANT CHANGE UP (ref 43–77)
NEUTROPHILS NFR BLD AUTO: 74.3 % — SIGNIFICANT CHANGE UP (ref 43–77)
NEUTROPHILS NFR BLD AUTO: 82.6 % — HIGH (ref 43–77)
NEUTROPHILS NFR BLD AUTO: 83.2 % — HIGH (ref 43–77)
NEUTROPHILS NFR BLD AUTO: 83.2 % — HIGH (ref 43–77)
NEUTROPHILS NFR BLD AUTO: 83.3 % — HIGH (ref 43–77)
NRBC # BLD: 0 /100 WBCS — SIGNIFICANT CHANGE UP (ref 0–0)
PHOSPHATE SERPL-MCNC: 1.9 MG/DL — LOW (ref 2.5–4.5)
PHOSPHATE SERPL-MCNC: 2.1 MG/DL — LOW (ref 2.5–4.5)
PHOSPHATE SERPL-MCNC: 2.2 MG/DL — LOW (ref 2.5–4.5)
PHOSPHATE SERPL-MCNC: 2.4 MG/DL — LOW (ref 2.5–4.5)
PHOSPHATE SERPL-MCNC: 2.4 MG/DL — LOW (ref 2.5–4.5)
PHOSPHATE SERPL-MCNC: 2.6 MG/DL — SIGNIFICANT CHANGE UP (ref 2.5–4.5)
PLAT MORPH BLD: NORMAL — SIGNIFICANT CHANGE UP
PLATELET # BLD AUTO: 218 K/UL — SIGNIFICANT CHANGE UP (ref 150–400)
PLATELET # BLD AUTO: 231 K/UL — SIGNIFICANT CHANGE UP (ref 150–400)
PLATELET # BLD AUTO: 251 K/UL — SIGNIFICANT CHANGE UP (ref 150–400)
PLATELET # BLD AUTO: 277 K/UL — SIGNIFICANT CHANGE UP (ref 150–400)
PLATELET # BLD AUTO: 277 K/UL — SIGNIFICANT CHANGE UP (ref 150–400)
PLATELET # BLD AUTO: 280 K/UL — SIGNIFICANT CHANGE UP (ref 150–400)
PLATELET # BLD AUTO: 284 K/UL — SIGNIFICANT CHANGE UP (ref 150–400)
PLATELET # BLD AUTO: 285 K/UL — SIGNIFICANT CHANGE UP (ref 150–400)
PLATELET # BLD AUTO: 319 K/UL — SIGNIFICANT CHANGE UP (ref 150–400)
PLATELET # BLD AUTO: 325 K/UL — SIGNIFICANT CHANGE UP (ref 150–400)
PLATELET # BLD AUTO: 331 K/UL — SIGNIFICANT CHANGE UP (ref 150–400)
PLATELET # BLD AUTO: 340 K/UL — SIGNIFICANT CHANGE UP (ref 150–400)
PLATELET # BLD AUTO: 340 K/UL — SIGNIFICANT CHANGE UP (ref 150–400)
PLATELET # BLD AUTO: 341 K/UL — SIGNIFICANT CHANGE UP (ref 150–400)
POTASSIUM SERPL-MCNC: 3.2 MMOL/L — LOW (ref 3.5–5.3)
POTASSIUM SERPL-MCNC: 3.3 MMOL/L — LOW (ref 3.5–5.3)
POTASSIUM SERPL-MCNC: 3.4 MMOL/L — LOW (ref 3.5–5.3)
POTASSIUM SERPL-MCNC: 3.5 MMOL/L — SIGNIFICANT CHANGE UP (ref 3.5–5.3)
POTASSIUM SERPL-MCNC: 3.7 MMOL/L — SIGNIFICANT CHANGE UP (ref 3.5–5.3)
POTASSIUM SERPL-MCNC: 3.8 MMOL/L — SIGNIFICANT CHANGE UP (ref 3.5–5.3)
POTASSIUM SERPL-MCNC: 3.9 MMOL/L — SIGNIFICANT CHANGE UP (ref 3.5–5.3)
POTASSIUM SERPL-MCNC: 4.1 MMOL/L — SIGNIFICANT CHANGE UP (ref 3.5–5.3)
POTASSIUM SERPL-MCNC: 4.1 MMOL/L — SIGNIFICANT CHANGE UP (ref 3.5–5.3)
POTASSIUM SERPL-MCNC: 4.4 MMOL/L — SIGNIFICANT CHANGE UP (ref 3.5–5.3)
POTASSIUM SERPL-MCNC: 4.5 MMOL/L — SIGNIFICANT CHANGE UP (ref 3.5–5.3)
POTASSIUM SERPL-SCNC: 3.2 MMOL/L — LOW (ref 3.5–5.3)
POTASSIUM SERPL-SCNC: 3.3 MMOL/L — LOW (ref 3.5–5.3)
POTASSIUM SERPL-SCNC: 3.4 MMOL/L — LOW (ref 3.5–5.3)
POTASSIUM SERPL-SCNC: 3.5 MMOL/L — SIGNIFICANT CHANGE UP (ref 3.5–5.3)
POTASSIUM SERPL-SCNC: 3.7 MMOL/L — SIGNIFICANT CHANGE UP (ref 3.5–5.3)
POTASSIUM SERPL-SCNC: 3.8 MMOL/L — SIGNIFICANT CHANGE UP (ref 3.5–5.3)
POTASSIUM SERPL-SCNC: 3.9 MMOL/L — SIGNIFICANT CHANGE UP (ref 3.5–5.3)
POTASSIUM SERPL-SCNC: 4.1 MMOL/L — SIGNIFICANT CHANGE UP (ref 3.5–5.3)
POTASSIUM SERPL-SCNC: 4.1 MMOL/L — SIGNIFICANT CHANGE UP (ref 3.5–5.3)
POTASSIUM SERPL-SCNC: 4.4 MMOL/L — SIGNIFICANT CHANGE UP (ref 3.5–5.3)
POTASSIUM SERPL-SCNC: 4.5 MMOL/L — SIGNIFICANT CHANGE UP (ref 3.5–5.3)
POTASSIUM SERPL-SCNC: 4.8 MMOL/L
PROC NAME: NORMAL
PROCALCITONIN SERPL-MCNC: 0.09 NG/ML — SIGNIFICANT CHANGE UP (ref 0.02–0.1)
PROT SERPL-MCNC: 5.7 G/DL — LOW (ref 6–8.3)
PROT SERPL-MCNC: 6 G/DL — SIGNIFICANT CHANGE UP (ref 6–8.3)
PROT SERPL-MCNC: 6.2 G/DL — SIGNIFICANT CHANGE UP (ref 6–8.3)
PROT SERPL-MCNC: 6.3 G/DL — SIGNIFICANT CHANGE UP (ref 6–8.3)
PROT SERPL-MCNC: 6.4 G/DL — SIGNIFICANT CHANGE UP (ref 6–8.3)
PROT SERPL-MCNC: 7 G/DL — SIGNIFICANT CHANGE UP (ref 6–8.3)
PROT SERPL-MCNC: 7.2 G/DL — SIGNIFICANT CHANGE UP (ref 6–8.3)
PROT SERPL-MCNC: 7.7 G/DL
PROTHROM AB SERPL-ACNC: 11.7 SEC — SIGNIFICANT CHANGE UP (ref 9.5–13)
PROTHROM AB SERPL-ACNC: 12.6 SEC — SIGNIFICANT CHANGE UP (ref 9.5–13)
PROTHROM AB SERPL-ACNC: 14.2 SEC — HIGH (ref 9.5–13)
PROTHROM AB SERPL-ACNC: 15.3 SEC — HIGH (ref 9.5–13)
PT BLD: 11 SEC
RBC # BLD: 3.11 M/UL — LOW (ref 3.8–5.2)
RBC # BLD: 3.2 M/UL — LOW (ref 3.8–5.2)
RBC # BLD: 3.23 M/UL — LOW (ref 3.8–5.2)
RBC # BLD: 3.31 M/UL — LOW (ref 3.8–5.2)
RBC # BLD: 3.31 M/UL — LOW (ref 3.8–5.2)
RBC # BLD: 3.33 M/UL — LOW (ref 3.8–5.2)
RBC # BLD: 3.34 M/UL — LOW (ref 3.8–5.2)
RBC # BLD: 3.41 M/UL — LOW (ref 3.8–5.2)
RBC # BLD: 3.49 M/UL — LOW (ref 3.8–5.2)
RBC # BLD: 3.58 M/UL — LOW (ref 3.8–5.2)
RBC # BLD: 3.63 M/UL — LOW (ref 3.8–5.2)
RBC # BLD: 3.73 M/UL — LOW (ref 3.8–5.2)
RBC # BLD: 3.89 M/UL — SIGNIFICANT CHANGE UP (ref 3.8–5.2)
RBC # BLD: 4.38 M/UL — SIGNIFICANT CHANGE UP (ref 3.8–5.2)
RBC # FLD: 12.6 % — SIGNIFICANT CHANGE UP (ref 10.3–14.5)
RBC # FLD: 12.9 % — SIGNIFICANT CHANGE UP (ref 10.3–14.5)
RBC # FLD: 13 % — SIGNIFICANT CHANGE UP (ref 10.3–14.5)
RBC # FLD: 13 % — SIGNIFICANT CHANGE UP (ref 10.3–14.5)
RBC # FLD: 13.2 % — SIGNIFICANT CHANGE UP (ref 10.3–14.5)
RBC # FLD: 13.3 % — SIGNIFICANT CHANGE UP (ref 10.3–14.5)
RBC # FLD: 13.4 % — SIGNIFICANT CHANGE UP (ref 10.3–14.5)
RBC # FLD: 13.7 % — SIGNIFICANT CHANGE UP (ref 10.3–14.5)
RBC # FLD: 13.8 % — SIGNIFICANT CHANGE UP (ref 10.3–14.5)
RBC BLD AUTO: SIGNIFICANT CHANGE UP
RH IG SCN BLD-IMP: POSITIVE — SIGNIFICANT CHANGE UP
RH IG SCN BLD-IMP: POSITIVE — SIGNIFICANT CHANGE UP
S AUREUS DNA NOSE QL NAA+PROBE: DETECTED
SODIUM SERPL-SCNC: 129 MMOL/L — LOW (ref 135–145)
SODIUM SERPL-SCNC: 131 MMOL/L — LOW (ref 135–145)
SODIUM SERPL-SCNC: 132 MMOL/L — LOW (ref 135–145)
SODIUM SERPL-SCNC: 132 MMOL/L — LOW (ref 135–145)
SODIUM SERPL-SCNC: 133 MMOL/L — LOW (ref 135–145)
SODIUM SERPL-SCNC: 134 MMOL/L
SODIUM SERPL-SCNC: 134 MMOL/L — LOW (ref 135–145)
SODIUM SERPL-SCNC: 135 MMOL/L — SIGNIFICANT CHANGE UP (ref 135–145)
SODIUM SERPL-SCNC: 136 MMOL/L — SIGNIFICANT CHANGE UP (ref 135–145)
SODIUM SERPL-SCNC: 136 MMOL/L — SIGNIFICANT CHANGE UP (ref 135–145)
SPECIMEN SOURCE: SIGNIFICANT CHANGE UP
SPECIMEN SOURCE: SIGNIFICANT CHANGE UP
SURGICAL PATHOLOGY STUDY: SIGNIFICANT CHANGE UP
SURGICAL PATHOLOGY STUDY: SIGNIFICANT CHANGE UP
T4 FREE SERPL-MCNC: 1.2 NG/DL — SIGNIFICANT CHANGE UP (ref 0.9–1.8)
T4 FREE SERPL-MCNC: 1.2 NG/DL — SIGNIFICANT CHANGE UP (ref 0.9–1.8)
TIBC SERPL-MCNC: 224 UG/DL — SIGNIFICANT CHANGE UP (ref 220–430)
TSH SERPL-MCNC: 0.89 UIU/ML — SIGNIFICANT CHANGE UP (ref 0.27–4.2)
TSH SERPL-MCNC: 1.67 UIU/ML — SIGNIFICANT CHANGE UP (ref 0.27–4.2)
UIBC SERPL-MCNC: 195 UG/DL — SIGNIFICANT CHANGE UP (ref 110–370)
VIT B12 SERPL-MCNC: >2000 PG/ML — HIGH (ref 232–1245)
WBC # BLD: 10.68 K/UL — HIGH (ref 3.8–10.5)
WBC # BLD: 11.62 K/UL — HIGH (ref 3.8–10.5)
WBC # BLD: 11.63 K/UL — HIGH (ref 3.8–10.5)
WBC # BLD: 12.05 K/UL — HIGH (ref 3.8–10.5)
WBC # BLD: 12.07 K/UL — HIGH (ref 3.8–10.5)
WBC # BLD: 13.2 K/UL — HIGH (ref 3.8–10.5)
WBC # BLD: 13.22 K/UL — HIGH (ref 3.8–10.5)
WBC # BLD: 14.91 K/UL — HIGH (ref 3.8–10.5)
WBC # BLD: 18.34 K/UL — HIGH (ref 3.8–10.5)
WBC # BLD: 18.42 K/UL — HIGH (ref 3.8–10.5)
WBC # BLD: 20.98 K/UL — HIGH (ref 3.8–10.5)
WBC # BLD: 21.12 K/UL — HIGH (ref 3.8–10.5)
WBC # BLD: 21.5 K/UL — HIGH (ref 3.8–10.5)
WBC # BLD: 22.07 K/UL — HIGH (ref 3.8–10.5)
WBC # FLD AUTO: 10.68 K/UL — HIGH (ref 3.8–10.5)
WBC # FLD AUTO: 11.62 K/UL — HIGH (ref 3.8–10.5)
WBC # FLD AUTO: 11.63 K/UL — HIGH (ref 3.8–10.5)
WBC # FLD AUTO: 12.05 K/UL — HIGH (ref 3.8–10.5)
WBC # FLD AUTO: 12.07 K/UL — HIGH (ref 3.8–10.5)
WBC # FLD AUTO: 13.2 K/UL — HIGH (ref 3.8–10.5)
WBC # FLD AUTO: 13.22 K/UL — HIGH (ref 3.8–10.5)
WBC # FLD AUTO: 14.91 K/UL — HIGH (ref 3.8–10.5)
WBC # FLD AUTO: 18.34 K/UL — HIGH (ref 3.8–10.5)
WBC # FLD AUTO: 18.42 K/UL — HIGH (ref 3.8–10.5)
WBC # FLD AUTO: 20.98 K/UL — HIGH (ref 3.8–10.5)
WBC # FLD AUTO: 21.12 K/UL — HIGH (ref 3.8–10.5)
WBC # FLD AUTO: 21.5 K/UL — HIGH (ref 3.8–10.5)
WBC # FLD AUTO: 22.07 K/UL — HIGH (ref 3.8–10.5)

## 2023-01-01 PROCEDURE — 77334 RADIATION TREATMENT AID(S): CPT | Mod: 26

## 2023-01-01 PROCEDURE — 93010 ELECTROCARDIOGRAM REPORT: CPT

## 2023-01-01 PROCEDURE — 88321 CONSLTJ&REPRT SLD PREP ELSWR: CPT

## 2023-01-01 PROCEDURE — 99222 1ST HOSP IP/OBS MODERATE 55: CPT | Mod: GC,25

## 2023-01-01 PROCEDURE — 74177 CT ABD & PELVIS W/CONTRAST: CPT | Mod: 26,MH

## 2023-01-01 PROCEDURE — 80048 BASIC METABOLIC PNL TOTAL CA: CPT

## 2023-01-01 PROCEDURE — 84439 ASSAY OF FREE THYROXINE: CPT

## 2023-01-01 PROCEDURE — 93971 EXTREMITY STUDY: CPT | Mod: 26,LT

## 2023-01-01 PROCEDURE — C1894: CPT

## 2023-01-01 PROCEDURE — 82607 VITAMIN B-12: CPT

## 2023-01-01 PROCEDURE — 93005 ELECTROCARDIOGRAM TRACING: CPT

## 2023-01-01 PROCEDURE — 84443 ASSAY THYROID STIM HORMONE: CPT

## 2023-01-01 PROCEDURE — 99215 OFFICE O/P EST HI 40 MIN: CPT

## 2023-01-01 PROCEDURE — 83735 ASSAY OF MAGNESIUM: CPT

## 2023-01-01 PROCEDURE — 74177 CT ABD & PELVIS W/CONTRAST: CPT

## 2023-01-01 PROCEDURE — 72158 MRI LUMBAR SPINE W/O & W/DYE: CPT | Mod: 26

## 2023-01-01 PROCEDURE — 82962 GLUCOSE BLOOD TEST: CPT

## 2023-01-01 PROCEDURE — 72158 MRI LUMBAR SPINE W/O & W/DYE: CPT

## 2023-01-01 PROCEDURE — 80053 COMPREHEN METABOLIC PANEL: CPT

## 2023-01-01 PROCEDURE — 77300 RADIATION THERAPY DOSE PLAN: CPT | Mod: 26

## 2023-01-01 PROCEDURE — G2212 PROLONG OUTPT/OFFICE VIS: CPT | Mod: GC

## 2023-01-01 PROCEDURE — 99203 OFFICE O/P NEW LOW 30 MIN: CPT

## 2023-01-01 PROCEDURE — 99204 OFFICE O/P NEW MOD 45 MIN: CPT | Mod: 95

## 2023-01-01 PROCEDURE — 99497 ADVNCD CARE PLAN 30 MIN: CPT | Mod: 25

## 2023-01-01 PROCEDURE — 99222 1ST HOSP IP/OBS MODERATE 55: CPT | Mod: 25,GC

## 2023-01-01 PROCEDURE — 36415 COLL VENOUS BLD VENIPUNCTURE: CPT

## 2023-01-01 PROCEDURE — 70450 CT HEAD/BRAIN W/O DYE: CPT | Mod: 26,MA

## 2023-01-01 PROCEDURE — 99233 SBSQ HOSP IP/OBS HIGH 50: CPT

## 2023-01-01 PROCEDURE — 93970 EXTREMITY STUDY: CPT | Mod: 26

## 2023-01-01 PROCEDURE — 71260 CT THORAX DX C+: CPT | Mod: 26,MH

## 2023-01-01 PROCEDURE — 74330 X-RAY BILE/PANC ENDOSCOPY: CPT

## 2023-01-01 PROCEDURE — 77295 3-D RADIOTHERAPY PLAN: CPT | Mod: 26

## 2023-01-01 PROCEDURE — 43242 EGD US FINE NEEDLE BX/ASPIR: CPT

## 2023-01-01 PROCEDURE — 99214 OFFICE O/P EST MOD 30 MIN: CPT

## 2023-01-01 PROCEDURE — 88305 TISSUE EXAM BY PATHOLOGIST: CPT | Mod: 26

## 2023-01-01 PROCEDURE — 88305 TISSUE EXAM BY PATHOLOGIST: CPT

## 2023-01-01 PROCEDURE — 88341 IMHCHEM/IMCYTCHM EA ADD ANTB: CPT

## 2023-01-01 PROCEDURE — 85025 COMPLETE CBC W/AUTO DIFF WBC: CPT

## 2023-01-01 PROCEDURE — 99215 OFFICE O/P EST HI 40 MIN: CPT | Mod: GC

## 2023-01-01 PROCEDURE — 88342 IMHCHEM/IMCYTCHM 1ST ANTB: CPT | Mod: 26

## 2023-01-01 PROCEDURE — 82746 ASSAY OF FOLIC ACID SERUM: CPT

## 2023-01-01 PROCEDURE — 43253 EGD US TRANSMURAL INJXN/MARK: CPT | Mod: GC

## 2023-01-01 PROCEDURE — 99222 1ST HOSP IP/OBS MODERATE 55: CPT | Mod: GC

## 2023-01-01 PROCEDURE — 97116 GAIT TRAINING THERAPY: CPT

## 2023-01-01 PROCEDURE — 45385 COLONOSCOPY W/LESION REMOVAL: CPT

## 2023-01-01 PROCEDURE — C1889: CPT

## 2023-01-01 PROCEDURE — 82728 ASSAY OF FERRITIN: CPT

## 2023-01-01 PROCEDURE — 83540 ASSAY OF IRON: CPT

## 2023-01-01 PROCEDURE — G2212 PROLONG OUTPT/OFFICE VIS: CPT

## 2023-01-01 PROCEDURE — 85027 COMPLETE CBC AUTOMATED: CPT

## 2023-01-01 PROCEDURE — 97163 PT EVAL HIGH COMPLEX 45 MIN: CPT

## 2023-01-01 PROCEDURE — 82330 ASSAY OF CALCIUM: CPT

## 2023-01-01 PROCEDURE — 85610 PROTHROMBIN TIME: CPT

## 2023-01-01 PROCEDURE — C1769: CPT

## 2023-01-01 PROCEDURE — 99232 SBSQ HOSP IP/OBS MODERATE 35: CPT

## 2023-01-01 PROCEDURE — 83550 IRON BINDING TEST: CPT

## 2023-01-01 PROCEDURE — 84100 ASSAY OF PHOSPHORUS: CPT

## 2023-01-01 PROCEDURE — 47534 PLMT BILIARY DRAINAGE CATH: CPT

## 2023-01-01 PROCEDURE — 97530 THERAPEUTIC ACTIVITIES: CPT

## 2023-01-01 PROCEDURE — 71045 X-RAY EXAM CHEST 1 VIEW: CPT

## 2023-01-01 PROCEDURE — 86901 BLOOD TYPING SEROLOGIC RH(D): CPT

## 2023-01-01 PROCEDURE — 86900 BLOOD TYPING SEROLOGIC ABO: CPT

## 2023-01-01 PROCEDURE — 86803 HEPATITIS C AB TEST: CPT

## 2023-01-01 PROCEDURE — 99498 ADVNCD CARE PLAN ADDL 30 MIN: CPT | Mod: 25

## 2023-01-01 PROCEDURE — 87640 STAPH A DNA AMP PROBE: CPT

## 2023-01-01 PROCEDURE — 85730 THROMBOPLASTIN TIME PARTIAL: CPT

## 2023-01-01 PROCEDURE — 88342 IMHCHEM/IMCYTCHM 1ST ANTB: CPT

## 2023-01-01 PROCEDURE — 84145 PROCALCITONIN (PCT): CPT

## 2023-01-01 PROCEDURE — 99285 EMERGENCY DEPT VISIT HI MDM: CPT

## 2023-01-01 PROCEDURE — 99233 SBSQ HOSP IP/OBS HIGH 50: CPT | Mod: GC

## 2023-01-01 PROCEDURE — A9585: CPT

## 2023-01-01 PROCEDURE — C9399: CPT

## 2023-01-01 PROCEDURE — 88360 TUMOR IMMUNOHISTOCHEM/MANUAL: CPT | Mod: XU

## 2023-01-01 PROCEDURE — 99223 1ST HOSP IP/OBS HIGH 75: CPT | Mod: GC

## 2023-01-01 PROCEDURE — 71045 X-RAY EXAM CHEST 1 VIEW: CPT | Mod: 26

## 2023-01-01 PROCEDURE — 76705 ECHO EXAM OF ABDOMEN: CPT | Mod: 26

## 2023-01-01 PROCEDURE — 96374 THER/PROPH/DIAG INJ IV PUSH: CPT

## 2023-01-01 PROCEDURE — 76705 ECHO EXAM OF ABDOMEN: CPT

## 2023-01-01 PROCEDURE — 87040 BLOOD CULTURE FOR BACTERIA: CPT

## 2023-01-01 PROCEDURE — 71250 CT THORAX DX C-: CPT | Mod: 26

## 2023-01-01 PROCEDURE — C1887: CPT

## 2023-01-01 PROCEDURE — 72131 CT LUMBAR SPINE W/O DYE: CPT

## 2023-01-01 PROCEDURE — 99213 OFFICE O/P EST LOW 20 MIN: CPT

## 2023-01-01 PROCEDURE — 71250 CT THORAX DX C-: CPT

## 2023-01-01 PROCEDURE — 99285 EMERGENCY DEPT VISIT HI MDM: CPT | Mod: 25

## 2023-01-01 PROCEDURE — 83036 HEMOGLOBIN GLYCOSYLATED A1C: CPT

## 2023-01-01 PROCEDURE — 70450 CT HEAD/BRAIN W/O DYE: CPT | Mod: MA

## 2023-01-01 PROCEDURE — 97161 PT EVAL LOW COMPLEX 20 MIN: CPT

## 2023-01-01 PROCEDURE — 88307 TISSUE EXAM BY PATHOLOGIST: CPT

## 2023-01-01 PROCEDURE — 93970 EXTREMITY STUDY: CPT

## 2023-01-01 PROCEDURE — 88341 IMHCHEM/IMCYTCHM EA ADD ANTB: CPT | Mod: 26

## 2023-01-01 PROCEDURE — 72131 CT LUMBAR SPINE W/O DYE: CPT | Mod: 26

## 2023-01-01 PROCEDURE — 96372 THER/PROPH/DIAG INJ SC/IM: CPT | Mod: XU

## 2023-01-01 PROCEDURE — 99223 1ST HOSP IP/OBS HIGH 75: CPT

## 2023-01-01 PROCEDURE — 86850 RBC ANTIBODY SCREEN: CPT

## 2023-01-01 PROCEDURE — 72157 MRI CHEST SPINE W/O & W/DYE: CPT

## 2023-01-01 PROCEDURE — 99232 SBSQ HOSP IP/OBS MODERATE 35: CPT | Mod: GC

## 2023-01-01 PROCEDURE — 72157 MRI CHEST SPINE W/O & W/DYE: CPT | Mod: 26

## 2023-01-01 PROCEDURE — 99285 EMERGENCY DEPT VISIT HI MDM: CPT | Mod: GC

## 2023-01-01 PROCEDURE — 77263 THER RADIOLOGY TX PLNG CPLX: CPT

## 2023-01-01 PROCEDURE — 93971 EXTREMITY STUDY: CPT

## 2023-01-01 PROCEDURE — 87641 MR-STAPH DNA AMP PROBE: CPT

## 2023-01-01 PROCEDURE — 77290 THER RAD SIMULAJ FIELD CPLX: CPT | Mod: 26

## 2023-01-01 PROCEDURE — 88307 TISSUE EXAM BY PATHOLOGIST: CPT | Mod: 26

## 2023-01-01 PROCEDURE — C1729: CPT

## 2023-01-01 PROCEDURE — 43260 ERCP W/SPECIMEN COLLECTION: CPT | Mod: GC

## 2023-01-01 PROCEDURE — 96375 TX/PRO/DX INJ NEW DRUG ADDON: CPT

## 2023-01-01 PROCEDURE — 71260 CT THORAX DX C+: CPT

## 2023-01-01 PROCEDURE — 43239 EGD BIOPSY SINGLE/MULTIPLE: CPT | Mod: 59

## 2023-01-01 DEVICE — AUTOTOME CANNULATING SPHINCTEROTOME RX 44 20MM: Type: IMPLANTABLE DEVICE | Status: FUNCTIONAL

## 2023-01-01 DEVICE — BLLN EXTRACT FUSION QUATRO 8.5 10 12 15MM: Type: IMPLANTABLE DEVICE | Status: FUNCTIONAL

## 2023-01-01 DEVICE — GWIRE VISIGLIDE ANG TIP 270CM .025: Type: IMPLANTABLE DEVICE | Status: FUNCTIONAL

## 2023-01-01 DEVICE — GWIRE TRIPLE LUMEN SPHINCTEROTOME STR 3.9FR 7X20MM: Type: IMPLANTABLE DEVICE | Status: FUNCTIONAL

## 2023-01-01 DEVICE — GEL PURASTAT 3ML: Type: IMPLANTABLE DEVICE | Status: FUNCTIONAL

## 2023-01-01 DEVICE — CATH BLLN EXTRACT DIST GUIDE WIRE 15MM 3LUM: Type: IMPLANTABLE DEVICE | Status: FUNCTIONAL

## 2023-01-01 RX ORDER — SODIUM CHLORIDE 9 MG/ML
1000 INJECTION, SOLUTION INTRAVENOUS
Refills: 0 | Status: DISCONTINUED | OUTPATIENT
Start: 2023-01-01 | End: 2023-01-01

## 2023-01-01 RX ORDER — METOCLOPRAMIDE 10 MG/1
10 TABLET ORAL EVERY 6 HOURS
Qty: 60 | Refills: 0 | Status: ACTIVE | COMMUNITY
Start: 2023-01-01 | End: 1900-01-01

## 2023-01-01 RX ORDER — PREDNISONE 10 MG/1
10 TABLET ORAL
Qty: 30 | Refills: 0 | Status: DISCONTINUED | COMMUNITY
Start: 2022-01-01 | End: 2023-01-01

## 2023-01-01 RX ORDER — POTASSIUM CHLORIDE 20 MEQ
10 PACKET (EA) ORAL ONCE
Refills: 0 | Status: COMPLETED | OUTPATIENT
Start: 2023-01-01 | End: 2023-01-01

## 2023-01-01 RX ORDER — SODIUM CHLORIDE 9 MG/ML
1000 INJECTION INTRAMUSCULAR; INTRAVENOUS; SUBCUTANEOUS ONCE
Refills: 0 | Status: COMPLETED | OUTPATIENT
Start: 2023-01-01 | End: 2023-01-01

## 2023-01-01 RX ORDER — INSULIN LISPRO 100/ML
VIAL (ML) SUBCUTANEOUS EVERY 6 HOURS
Refills: 0 | Status: DISCONTINUED | OUTPATIENT
Start: 2023-01-01 | End: 2023-01-01

## 2023-01-01 RX ORDER — INSULIN LISPRO 100/ML
VIAL (ML) SUBCUTANEOUS AT BEDTIME
Refills: 0 | Status: DISCONTINUED | OUTPATIENT
Start: 2023-01-01 | End: 2023-01-01

## 2023-01-01 RX ORDER — ATENOLOL 25 MG/1
1 TABLET ORAL
Qty: 0 | Refills: 0 | DISCHARGE
Start: 2023-01-01

## 2023-01-01 RX ORDER — SENNA PLUS 8.6 MG/1
2 TABLET ORAL AT BEDTIME
Refills: 0 | Status: DISCONTINUED | OUTPATIENT
Start: 2023-01-01 | End: 2023-01-01

## 2023-01-01 RX ORDER — DULOXETINE HYDROCHLORIDE 30 MG/1
1 CAPSULE, DELAYED RELEASE ORAL
Qty: 30 | Refills: 0
Start: 2023-01-01 | End: 2023-10-27

## 2023-01-01 RX ORDER — OXYCODONE HYDROCHLORIDE 5 MG/1
2.5 TABLET ORAL EVERY 4 HOURS
Refills: 0 | Status: DISCONTINUED | OUTPATIENT
Start: 2023-01-01 | End: 2023-01-01

## 2023-01-01 RX ORDER — SODIUM CHLORIDE 9 MG/ML
1000 INJECTION INTRAMUSCULAR; INTRAVENOUS; SUBCUTANEOUS
Refills: 0 | Status: DISCONTINUED | OUTPATIENT
Start: 2023-01-01 | End: 2023-01-01

## 2023-01-01 RX ORDER — ONDANSETRON 8 MG/1
4 TABLET, FILM COATED ORAL EVERY 8 HOURS
Refills: 0 | Status: DISCONTINUED | OUTPATIENT
Start: 2023-01-01 | End: 2023-01-01

## 2023-01-01 RX ORDER — GABAPENTIN 400 MG/1
100 CAPSULE ORAL THREE TIMES A DAY
Refills: 0 | Status: DISCONTINUED | OUTPATIENT
Start: 2023-01-01 | End: 2023-01-01

## 2023-01-01 RX ORDER — GABAPENTIN 400 MG/1
1 CAPSULE ORAL
Qty: 90 | Refills: 0
Start: 2023-01-01 | End: 2023-10-27

## 2023-01-01 RX ORDER — INSULIN LISPRO 100/ML
VIAL (ML) SUBCUTANEOUS
Refills: 0 | Status: DISCONTINUED | OUTPATIENT
Start: 2023-01-01 | End: 2023-01-01

## 2023-01-01 RX ORDER — GLUCAGON INJECTION, SOLUTION 0.5 MG/.1ML
1 INJECTION, SOLUTION SUBCUTANEOUS ONCE
Refills: 0 | Status: DISCONTINUED | OUTPATIENT
Start: 2023-01-01 | End: 2023-01-01

## 2023-01-01 RX ORDER — SODIUM CHLORIDE 9 MG/ML
500 INJECTION, SOLUTION INTRAVENOUS
Refills: 0 | Status: COMPLETED | OUTPATIENT
Start: 2023-01-01 | End: 2023-01-01

## 2023-01-01 RX ORDER — CALCITONIN SALMON 200 [IU]/ML
220 INJECTION, SOLUTION INTRAMUSCULAR ONCE
Refills: 0 | Status: DISCONTINUED | OUTPATIENT
Start: 2023-01-01 | End: 2023-01-01

## 2023-01-01 RX ORDER — BUPIVACAINE HCL/PF 7.5 MG/ML
4 VIAL (ML) INJECTION ONCE
Refills: 0 | Status: DISCONTINUED | OUTPATIENT
Start: 2023-01-01 | End: 2023-01-01

## 2023-01-01 RX ORDER — DEXTROSE 50 % IN WATER 50 %
12.5 SYRINGE (ML) INTRAVENOUS ONCE
Refills: 0 | Status: DISCONTINUED | OUTPATIENT
Start: 2023-01-01 | End: 2023-01-01

## 2023-01-01 RX ORDER — POLYETHYLENE GLYCOL 3350 17 G/17G
17 POWDER, FOR SOLUTION ORAL DAILY
Refills: 0 | Status: DISCONTINUED | OUTPATIENT
Start: 2023-01-01 | End: 2023-01-01

## 2023-01-01 RX ORDER — TRIAMCINOLONE ACETONIDE 1 MG/G
0.1 CREAM TOPICAL
Qty: 1 | Refills: 0 | Status: DISCONTINUED | COMMUNITY
Start: 2023-01-01 | End: 2023-01-01

## 2023-01-01 RX ORDER — CALCITONIN SALMON 200 [IU]/ML
220 INJECTION, SOLUTION INTRAMUSCULAR ONCE
Refills: 0 | Status: COMPLETED | OUTPATIENT
Start: 2023-01-01 | End: 2023-01-01

## 2023-01-01 RX ORDER — DEXTROSE 50 % IN WATER 50 %
25 SYRINGE (ML) INTRAVENOUS ONCE
Refills: 0 | Status: DISCONTINUED | OUTPATIENT
Start: 2023-01-01 | End: 2023-01-01

## 2023-01-01 RX ORDER — METHADONE HYDROCHLORIDE 40 MG/1
2.5 TABLET ORAL EVERY 12 HOURS
Refills: 0 | Status: DISCONTINUED | OUTPATIENT
Start: 2023-01-01 | End: 2023-01-01

## 2023-01-01 RX ORDER — POTASSIUM CHLORIDE 20 MEQ
40 PACKET (EA) ORAL ONCE
Refills: 0 | Status: COMPLETED | OUTPATIENT
Start: 2023-01-01 | End: 2023-01-01

## 2023-01-01 RX ORDER — OXYCODONE HYDROCHLORIDE 5 MG/1
10 TABLET ORAL EVERY 4 HOURS
Refills: 0 | Status: DISCONTINUED | OUTPATIENT
Start: 2023-01-01 | End: 2023-01-01

## 2023-01-01 RX ORDER — SODIUM CHLORIDE 9 MG/ML
1000 INJECTION, SOLUTION INTRAVENOUS ONCE
Refills: 0 | Status: DISCONTINUED | OUTPATIENT
Start: 2023-01-01 | End: 2023-01-01

## 2023-01-01 RX ORDER — ACETAMINOPHEN 500 MG
1000 TABLET ORAL ONCE
Refills: 0 | Status: COMPLETED | OUTPATIENT
Start: 2023-01-01 | End: 2023-01-01

## 2023-01-01 RX ORDER — HEPARIN SODIUM 5000 [USP'U]/ML
2000 INJECTION INTRAVENOUS; SUBCUTANEOUS EVERY 6 HOURS
Refills: 0 | Status: DISCONTINUED | OUTPATIENT
Start: 2023-01-01 | End: 2023-01-01

## 2023-01-01 RX ORDER — HEPARIN SODIUM 5000 [USP'U]/ML
4000 INJECTION INTRAVENOUS; SUBCUTANEOUS EVERY 6 HOURS
Refills: 0 | Status: DISCONTINUED | OUTPATIENT
Start: 2023-01-01 | End: 2023-01-01

## 2023-01-01 RX ORDER — ENOXAPARIN SODIUM 100 MG/ML
40 INJECTION SUBCUTANEOUS EVERY 24 HOURS
Refills: 0 | Status: DISCONTINUED | OUTPATIENT
Start: 2023-01-01 | End: 2023-01-01

## 2023-01-01 RX ORDER — CHLORHEXIDINE GLUCONATE 213 G/1000ML
1 SOLUTION TOPICAL DAILY
Refills: 0 | Status: DISCONTINUED | OUTPATIENT
Start: 2023-01-01 | End: 2023-01-01

## 2023-01-01 RX ORDER — OXYCODONE HYDROCHLORIDE 5 MG/1
5 TABLET ORAL EVERY 4 HOURS
Refills: 0 | Status: DISCONTINUED | OUTPATIENT
Start: 2023-01-01 | End: 2023-01-01

## 2023-01-01 RX ORDER — OXYCODONE HYDROCHLORIDE 5 MG/1
1 TABLET ORAL
Refills: 0 | DISCHARGE

## 2023-01-01 RX ORDER — CALCIUM CARBONATE/VITAMIN D3 600MG-5MCG
600-5 TABLET ORAL TWICE DAILY
Qty: 60 | Refills: 1 | Status: ACTIVE | COMMUNITY
Start: 2023-01-01 | End: 1900-01-01

## 2023-01-01 RX ORDER — MAGNESIUM SULFATE 500 MG/ML
1 VIAL (ML) INJECTION ONCE
Refills: 0 | Status: COMPLETED | OUTPATIENT
Start: 2023-01-01 | End: 2023-01-01

## 2023-01-01 RX ORDER — ALPRAZOLAM 0.25 MG
0.5 TABLET ORAL AT BEDTIME
Refills: 0 | Status: DISCONTINUED | OUTPATIENT
Start: 2023-01-01 | End: 2023-01-01

## 2023-01-01 RX ORDER — DULOXETINE HYDROCHLORIDE 30 MG/1
20 CAPSULE, DELAYED RELEASE ORAL DAILY
Refills: 0 | Status: DISCONTINUED | OUTPATIENT
Start: 2023-01-01 | End: 2023-01-01

## 2023-01-01 RX ORDER — SODIUM CHLORIDE 9 MG/ML
1000 INJECTION, SOLUTION INTRAVENOUS ONCE
Refills: 0 | Status: COMPLETED | OUTPATIENT
Start: 2023-01-01 | End: 2023-01-01

## 2023-01-01 RX ORDER — CETIRIZINE HYDROCHLORIDE 10 MG/1
10 TABLET, COATED ORAL
Qty: 30 | Refills: 1 | Status: DISCONTINUED | COMMUNITY
Start: 2023-01-01 | End: 2023-01-01

## 2023-01-01 RX ORDER — CEFTRIAXONE 500 MG/1
1000 INJECTION, POWDER, FOR SOLUTION INTRAMUSCULAR; INTRAVENOUS ONCE
Refills: 0 | Status: COMPLETED | OUTPATIENT
Start: 2023-01-01 | End: 2023-01-01

## 2023-01-01 RX ORDER — ATENOLOL 25 MG/1
1 TABLET ORAL
Refills: 0 | DISCHARGE

## 2023-01-01 RX ORDER — MORPHINE SULFATE 50 MG/1
2 CAPSULE, EXTENDED RELEASE ORAL EVERY 4 HOURS
Refills: 0 | Status: DISCONTINUED | OUTPATIENT
Start: 2023-01-01 | End: 2023-01-01

## 2023-01-01 RX ORDER — AZITHROMYCIN 500 MG/1
500 TABLET, FILM COATED ORAL EVERY 24 HOURS
Refills: 0 | Status: DISCONTINUED | OUTPATIENT
Start: 2023-01-01 | End: 2023-01-01

## 2023-01-01 RX ORDER — ATENOLOL 25 MG/1
50 TABLET ORAL DAILY
Refills: 0 | Status: DISCONTINUED | OUTPATIENT
Start: 2023-01-01 | End: 2023-01-01

## 2023-01-01 RX ORDER — PANCRELIPASE 120000; 24000; 76000 [USP'U]/1; [USP'U]/1; [USP'U]/1
24000-76000 CAPSULE, DELAYED RELEASE PELLETS ORAL
Qty: 200 | Refills: 0 | Status: ACTIVE | COMMUNITY
Start: 2023-01-01 | End: 1900-01-01

## 2023-01-01 RX ORDER — OMEPRAZOLE 10 MG/1
1 CAPSULE, DELAYED RELEASE ORAL
Refills: 0 | DISCHARGE

## 2023-01-01 RX ORDER — LIPASE/PROTEASE/AMYLASE 16-48-48K
2 CAPSULE,DELAYED RELEASE (ENTERIC COATED) ORAL
Refills: 0 | DISCHARGE

## 2023-01-01 RX ORDER — LIPASE/PROTEASE/AMYLASE 16-48-48K
2 CAPSULE,DELAYED RELEASE (ENTERIC COATED) ORAL
Refills: 0 | Status: DISCONTINUED | OUTPATIENT
Start: 2023-01-01 | End: 2023-01-01

## 2023-01-01 RX ORDER — AZITHROMYCIN 500 MG/1
500 TABLET, FILM COATED ORAL ONCE
Refills: 0 | Status: COMPLETED | OUTPATIENT
Start: 2023-01-01 | End: 2023-01-01

## 2023-01-01 RX ORDER — DEXTROSE 50 % IN WATER 50 %
15 SYRINGE (ML) INTRAVENOUS ONCE
Refills: 0 | Status: DISCONTINUED | OUTPATIENT
Start: 2023-01-01 | End: 2023-01-01

## 2023-01-01 RX ORDER — POLYETHYLENE GLYCOL 3350 17 G/17G
17 POWDER, FOR SOLUTION ORAL ONCE
Refills: 0 | Status: DISCONTINUED | OUTPATIENT
Start: 2023-01-01 | End: 2023-01-01

## 2023-01-01 RX ORDER — ONDANSETRON 8 MG/1
4 TABLET, FILM COATED ORAL ONCE
Refills: 0 | Status: DISCONTINUED | OUTPATIENT
Start: 2023-01-01 | End: 2023-01-01

## 2023-01-01 RX ORDER — CEFTRIAXONE 500 MG/1
1000 INJECTION, POWDER, FOR SOLUTION INTRAMUSCULAR; INTRAVENOUS EVERY 24 HOURS
Refills: 0 | Status: DISCONTINUED | OUTPATIENT
Start: 2023-01-01 | End: 2023-01-01

## 2023-01-01 RX ORDER — METOCLOPRAMIDE HCL 10 MG
1 TABLET ORAL
Refills: 0 | DISCHARGE

## 2023-01-01 RX ORDER — ATENOLOL 25 MG/1
25 TABLET ORAL ONCE
Refills: 0 | Status: COMPLETED | OUTPATIENT
Start: 2023-01-01 | End: 2023-01-01

## 2023-01-01 RX ORDER — METHADONE HYDROCHLORIDE 5 MG/1
5 TABLET ORAL
Qty: 45 | Refills: 0 | Status: ACTIVE | COMMUNITY
Start: 2023-01-01 | End: 1900-01-01

## 2023-01-01 RX ORDER — INFLUENZA VIRUS VACCINE 15; 15; 15; 15 UG/.5ML; UG/.5ML; UG/.5ML; UG/.5ML
0.7 SUSPENSION INTRAMUSCULAR ONCE
Refills: 0 | Status: DISCONTINUED | OUTPATIENT
Start: 2023-01-01 | End: 2023-01-01

## 2023-01-01 RX ORDER — SENNA PLUS 8.6 MG/1
2 TABLET ORAL
Qty: 0 | Refills: 0 | DISCHARGE
Start: 2023-01-01

## 2023-01-01 RX ORDER — OXYCODONE AND ACETAMINOPHEN 5; 325 MG/1; MG/1
1 TABLET ORAL ONCE
Refills: 0 | Status: DISCONTINUED | OUTPATIENT
Start: 2023-01-01 | End: 2023-01-01

## 2023-01-01 RX ORDER — MULTIVIT-MIN/FERROUS GLUCONATE 9 MG/15 ML
15 LIQUID (ML) ORAL DAILY
Refills: 0 | Status: DISCONTINUED | OUTPATIENT
Start: 2023-01-01 | End: 2023-01-01

## 2023-01-01 RX ORDER — ATORVASTATIN CALCIUM 80 MG/1
40 TABLET, FILM COATED ORAL AT BEDTIME
Refills: 0 | Status: DISCONTINUED | OUTPATIENT
Start: 2023-01-01 | End: 2023-01-01

## 2023-01-01 RX ORDER — POLYETHYLENE GLYCOL 3350 17 G/17G
17 POWDER, FOR SOLUTION ORAL
Qty: 0 | Refills: 0 | DISCHARGE
Start: 2023-01-01

## 2023-01-01 RX ORDER — ACETAMINOPHEN 500 MG
650 TABLET ORAL EVERY 6 HOURS
Refills: 0 | Status: DISCONTINUED | OUTPATIENT
Start: 2023-01-01 | End: 2023-01-01

## 2023-01-01 RX ORDER — POTASSIUM PHOSPHATE, MONOBASIC POTASSIUM PHOSPHATE, DIBASIC 236; 224 MG/ML; MG/ML
15 INJECTION, SOLUTION INTRAVENOUS ONCE
Refills: 0 | Status: COMPLETED | OUTPATIENT
Start: 2023-01-01 | End: 2023-01-01

## 2023-01-01 RX ORDER — LANOLIN ALCOHOL/MO/W.PET/CERES
3 CREAM (GRAM) TOPICAL AT BEDTIME
Refills: 0 | Status: DISCONTINUED | OUTPATIENT
Start: 2023-01-01 | End: 2023-01-01

## 2023-01-01 RX ORDER — INDOMETHACIN 50 MG
100 CAPSULE ORAL ONCE
Refills: 0 | Status: DISCONTINUED | OUTPATIENT
Start: 2023-01-01 | End: 2023-01-01

## 2023-01-01 RX ORDER — ROSUVASTATIN CALCIUM 5 MG/1
1 TABLET ORAL
Refills: 0 | DISCHARGE

## 2023-01-01 RX ORDER — THIAMINE MONONITRATE (VIT B1) 100 MG
500 TABLET ORAL DAILY
Refills: 0 | Status: DISCONTINUED | OUTPATIENT
Start: 2023-01-01 | End: 2023-01-01

## 2023-01-01 RX ORDER — OMEPRAZOLE 40 MG/1
40 CAPSULE, DELAYED RELEASE ORAL
Qty: 60 | Refills: 0 | Status: ACTIVE | COMMUNITY
Start: 2023-01-01 | End: 1900-01-01

## 2023-01-01 RX ORDER — ATENOLOL 25 MG/1
25 TABLET ORAL DAILY
Refills: 0 | Status: DISCONTINUED | OUTPATIENT
Start: 2023-01-01 | End: 2023-01-01

## 2023-01-01 RX ORDER — POTASSIUM CHLORIDE 20 MEQ
20 PACKET (EA) ORAL ONCE
Refills: 0 | Status: COMPLETED | OUTPATIENT
Start: 2023-01-01 | End: 2023-01-01

## 2023-01-01 RX ORDER — PANTOPRAZOLE SODIUM 20 MG/1
1 TABLET, DELAYED RELEASE ORAL
Qty: 0 | Refills: 0 | DISCHARGE
Start: 2023-01-01

## 2023-01-01 RX ORDER — PANTOPRAZOLE SODIUM 20 MG/1
40 TABLET, DELAYED RELEASE ORAL
Refills: 0 | Status: DISCONTINUED | OUTPATIENT
Start: 2023-01-01 | End: 2023-01-01

## 2023-01-01 RX ORDER — POTASSIUM CHLORIDE 20 MEQ
40 PACKET (EA) ORAL ONCE
Refills: 0 | Status: DISCONTINUED | OUTPATIENT
Start: 2023-01-01 | End: 2023-01-01

## 2023-01-01 RX ORDER — HEPARIN SODIUM 5000 [USP'U]/ML
INJECTION INTRAVENOUS; SUBCUTANEOUS
Qty: 25000 | Refills: 0 | Status: DISCONTINUED | OUTPATIENT
Start: 2023-01-01 | End: 2023-01-01

## 2023-01-01 RX ORDER — METHADONE HYDROCHLORIDE 40 MG/1
2.5 TABLET ORAL EVERY 8 HOURS
Refills: 0 | Status: DISCONTINUED | OUTPATIENT
Start: 2023-01-01 | End: 2023-01-01

## 2023-01-01 RX ORDER — ENOXAPARIN SODIUM 100 MG/ML
60 INJECTION SUBCUTANEOUS EVERY 12 HOURS
Refills: 0 | Status: DISCONTINUED | OUTPATIENT
Start: 2023-01-01 | End: 2023-01-01

## 2023-01-01 RX ORDER — SODIUM CHLORIDE 9 MG/ML
500 INJECTION, SOLUTION INTRAVENOUS
Refills: 0 | Status: DISCONTINUED | OUTPATIENT
Start: 2023-01-01 | End: 2023-01-01

## 2023-01-01 RX ORDER — OXYCODONE 5 MG/1
5 TABLET ORAL EVERY 4 HOURS
Qty: 75 | Refills: 0 | Status: ACTIVE | COMMUNITY
Start: 2023-01-01 | End: 1900-01-01

## 2023-01-01 RX ORDER — OXYCODONE 5 MG/1
5 TABLET ORAL
Qty: 60 | Refills: 0 | Status: ACTIVE | COMMUNITY
Start: 2023-01-01 | End: 1900-01-01

## 2023-01-01 RX ORDER — ALPRAZOLAM 0.25 MG
0.5 TABLET ORAL ONCE
Refills: 0 | Status: DISCONTINUED | OUTPATIENT
Start: 2023-01-01 | End: 2023-01-01

## 2023-01-01 RX ORDER — CHLORHEXIDINE GLUCONATE 213 G/1000ML
1 SOLUTION TOPICAL
Refills: 0 | Status: DISCONTINUED | OUTPATIENT
Start: 2023-01-01 | End: 2023-01-01

## 2023-01-01 RX ORDER — LIDOCAINE AND PRILOCAINE 25; 25 MG/G; MG/G
2.5-2.5 CREAM TOPICAL
Qty: 1 | Refills: 0 | Status: DISCONTINUED | COMMUNITY
Start: 2023-01-01 | End: 2023-01-01

## 2023-01-01 RX ORDER — OXYCODONE AND ACETAMINOPHEN 5; 325 MG/1; MG/1
1 TABLET ORAL EVERY 4 HOURS
Refills: 0 | Status: DISCONTINUED | OUTPATIENT
Start: 2023-01-01 | End: 2023-01-01

## 2023-01-01 RX ORDER — ALPRAZOLAM 0.25 MG
0.25 TABLET ORAL EVERY 6 HOURS
Refills: 0 | Status: DISCONTINUED | OUTPATIENT
Start: 2023-01-01 | End: 2023-01-01

## 2023-01-01 RX ORDER — OXYCODONE HYDROCHLORIDE 5 MG/1
5 TABLET ORAL ONCE
Refills: 0 | Status: DISCONTINUED | OUTPATIENT
Start: 2023-01-01 | End: 2023-01-01

## 2023-01-01 RX ADMIN — ATENOLOL 50 MILLIGRAM(S): 25 TABLET ORAL at 06:17

## 2023-01-01 RX ADMIN — PANTOPRAZOLE SODIUM 40 MILLIGRAM(S): 20 TABLET, DELAYED RELEASE ORAL at 06:09

## 2023-01-01 RX ADMIN — Medication 20 MILLIEQUIVALENT(S): at 18:39

## 2023-01-01 RX ADMIN — Medication 40 MILLIEQUIVALENT(S): at 11:38

## 2023-01-01 RX ADMIN — PANTOPRAZOLE SODIUM 40 MILLIGRAM(S): 20 TABLET, DELAYED RELEASE ORAL at 06:29

## 2023-01-01 RX ADMIN — CHLORHEXIDINE GLUCONATE 1 APPLICATION(S): 213 SOLUTION TOPICAL at 08:36

## 2023-01-01 RX ADMIN — Medication 0.5 MILLIGRAM(S): at 21:29

## 2023-01-01 RX ADMIN — Medication 100 MILLIEQUIVALENT(S): at 00:57

## 2023-01-01 RX ADMIN — ENOXAPARIN SODIUM 60 MILLIGRAM(S): 100 INJECTION SUBCUTANEOUS at 06:43

## 2023-01-01 RX ADMIN — PANTOPRAZOLE SODIUM 40 MILLIGRAM(S): 20 TABLET, DELAYED RELEASE ORAL at 17:22

## 2023-01-01 RX ADMIN — PANTOPRAZOLE SODIUM 40 MILLIGRAM(S): 20 TABLET, DELAYED RELEASE ORAL at 05:46

## 2023-01-01 RX ADMIN — Medication 15 MILLILITER(S): at 18:03

## 2023-01-01 RX ADMIN — GABAPENTIN 100 MILLIGRAM(S): 400 CAPSULE ORAL at 05:21

## 2023-01-01 RX ADMIN — OXYCODONE HYDROCHLORIDE 5 MILLIGRAM(S): 5 TABLET ORAL at 10:50

## 2023-01-01 RX ADMIN — OXYCODONE HYDROCHLORIDE 10 MILLIGRAM(S): 5 TABLET ORAL at 13:38

## 2023-01-01 RX ADMIN — PANTOPRAZOLE SODIUM 40 MILLIGRAM(S): 20 TABLET, DELAYED RELEASE ORAL at 17:09

## 2023-01-01 RX ADMIN — POLYETHYLENE GLYCOL 3350 17 GRAM(S): 17 POWDER, FOR SOLUTION ORAL at 13:00

## 2023-01-01 RX ADMIN — METHADONE HYDROCHLORIDE 2.5 MILLIGRAM(S): 40 TABLET ORAL at 13:25

## 2023-01-01 RX ADMIN — ENOXAPARIN SODIUM 40 MILLIGRAM(S): 100 INJECTION SUBCUTANEOUS at 16:45

## 2023-01-01 RX ADMIN — ATENOLOL 50 MILLIGRAM(S): 25 TABLET ORAL at 06:37

## 2023-01-01 RX ADMIN — Medication 0.5 MILLIGRAM(S): at 21:40

## 2023-01-01 RX ADMIN — OXYCODONE HYDROCHLORIDE 10 MILLIGRAM(S): 5 TABLET ORAL at 22:00

## 2023-01-01 RX ADMIN — DULOXETINE HYDROCHLORIDE 20 MILLIGRAM(S): 30 CAPSULE, DELAYED RELEASE ORAL at 11:49

## 2023-01-01 RX ADMIN — OXYCODONE HYDROCHLORIDE 5 MILLIGRAM(S): 5 TABLET ORAL at 18:22

## 2023-01-01 RX ADMIN — ATENOLOL 25 MILLIGRAM(S): 25 TABLET ORAL at 06:25

## 2023-01-01 RX ADMIN — Medication 0.5 MILLIGRAM(S): at 20:29

## 2023-01-01 RX ADMIN — OXYCODONE HYDROCHLORIDE 10 MILLIGRAM(S): 5 TABLET ORAL at 21:00

## 2023-01-01 RX ADMIN — GABAPENTIN 100 MILLIGRAM(S): 400 CAPSULE ORAL at 13:56

## 2023-01-01 RX ADMIN — GABAPENTIN 100 MILLIGRAM(S): 400 CAPSULE ORAL at 21:31

## 2023-01-01 RX ADMIN — SODIUM CHLORIDE 75 MILLILITER(S): 9 INJECTION INTRAMUSCULAR; INTRAVENOUS; SUBCUTANEOUS at 20:16

## 2023-01-01 RX ADMIN — OXYCODONE HYDROCHLORIDE 10 MILLIGRAM(S): 5 TABLET ORAL at 20:14

## 2023-01-01 RX ADMIN — ATORVASTATIN CALCIUM 40 MILLIGRAM(S): 80 TABLET, FILM COATED ORAL at 21:31

## 2023-01-01 RX ADMIN — PANTOPRAZOLE SODIUM 40 MILLIGRAM(S): 20 TABLET, DELAYED RELEASE ORAL at 06:17

## 2023-01-01 RX ADMIN — GABAPENTIN 100 MILLIGRAM(S): 400 CAPSULE ORAL at 21:41

## 2023-01-01 RX ADMIN — ENOXAPARIN SODIUM 40 MILLIGRAM(S): 100 INJECTION SUBCUTANEOUS at 18:04

## 2023-01-01 RX ADMIN — DULOXETINE HYDROCHLORIDE 20 MILLIGRAM(S): 30 CAPSULE, DELAYED RELEASE ORAL at 12:37

## 2023-01-01 RX ADMIN — ATORVASTATIN CALCIUM 40 MILLIGRAM(S): 80 TABLET, FILM COATED ORAL at 21:30

## 2023-01-01 RX ADMIN — SENNA PLUS 2 TABLET(S): 8.6 TABLET ORAL at 21:31

## 2023-01-01 RX ADMIN — OXYCODONE HYDROCHLORIDE 5 MILLIGRAM(S): 5 TABLET ORAL at 21:52

## 2023-01-01 RX ADMIN — AZITHROMYCIN 255 MILLIGRAM(S): 500 TABLET, FILM COATED ORAL at 23:44

## 2023-01-01 RX ADMIN — Medication 0.5 MILLIGRAM(S): at 20:06

## 2023-01-01 RX ADMIN — SENNA PLUS 2 TABLET(S): 8.6 TABLET ORAL at 21:34

## 2023-01-01 RX ADMIN — OXYCODONE HYDROCHLORIDE 10 MILLIGRAM(S): 5 TABLET ORAL at 20:46

## 2023-01-01 RX ADMIN — Medication 0.5 MILLIGRAM(S): at 20:50

## 2023-01-01 RX ADMIN — OXYCODONE HYDROCHLORIDE 10 MILLIGRAM(S): 5 TABLET ORAL at 07:48

## 2023-01-01 RX ADMIN — CHLORHEXIDINE GLUCONATE 1 APPLICATION(S): 213 SOLUTION TOPICAL at 17:51

## 2023-01-01 RX ADMIN — Medication 0.5 MILLIGRAM(S): at 20:10

## 2023-01-01 RX ADMIN — ENOXAPARIN SODIUM 60 MILLIGRAM(S): 100 INJECTION SUBCUTANEOUS at 22:18

## 2023-01-01 RX ADMIN — OXYCODONE HYDROCHLORIDE 5 MILLIGRAM(S): 5 TABLET ORAL at 12:38

## 2023-01-01 RX ADMIN — OXYCODONE HYDROCHLORIDE 10 MILLIGRAM(S): 5 TABLET ORAL at 06:17

## 2023-01-01 RX ADMIN — Medication 100 GRAM(S): at 14:16

## 2023-01-01 RX ADMIN — DULOXETINE HYDROCHLORIDE 20 MILLIGRAM(S): 30 CAPSULE, DELAYED RELEASE ORAL at 11:07

## 2023-01-01 RX ADMIN — GABAPENTIN 100 MILLIGRAM(S): 400 CAPSULE ORAL at 22:18

## 2023-01-01 RX ADMIN — DULOXETINE HYDROCHLORIDE 20 MILLIGRAM(S): 30 CAPSULE, DELAYED RELEASE ORAL at 21:34

## 2023-01-01 RX ADMIN — OXYCODONE HYDROCHLORIDE 10 MILLIGRAM(S): 5 TABLET ORAL at 22:46

## 2023-01-01 RX ADMIN — ATENOLOL 50 MILLIGRAM(S): 25 TABLET ORAL at 06:29

## 2023-01-01 RX ADMIN — METHADONE HYDROCHLORIDE 2.5 MILLIGRAM(S): 40 TABLET ORAL at 19:26

## 2023-01-01 RX ADMIN — OXYCODONE HYDROCHLORIDE 10 MILLIGRAM(S): 5 TABLET ORAL at 21:28

## 2023-01-01 RX ADMIN — GABAPENTIN 100 MILLIGRAM(S): 400 CAPSULE ORAL at 22:14

## 2023-01-01 RX ADMIN — Medication 40 MILLIEQUIVALENT(S): at 14:12

## 2023-01-01 RX ADMIN — SENNA PLUS 2 TABLET(S): 8.6 TABLET ORAL at 21:30

## 2023-01-01 RX ADMIN — PANTOPRAZOLE SODIUM 40 MILLIGRAM(S): 20 TABLET, DELAYED RELEASE ORAL at 18:19

## 2023-01-01 RX ADMIN — OXYCODONE HYDROCHLORIDE 10 MILLIGRAM(S): 5 TABLET ORAL at 20:45

## 2023-01-01 RX ADMIN — Medication 40 MILLIEQUIVALENT(S): at 02:04

## 2023-01-01 RX ADMIN — SENNA PLUS 2 TABLET(S): 8.6 TABLET ORAL at 22:17

## 2023-01-01 RX ADMIN — POLYETHYLENE GLYCOL 3350 17 GRAM(S): 17 POWDER, FOR SOLUTION ORAL at 12:37

## 2023-01-01 RX ADMIN — OXYCODONE HYDROCHLORIDE 10 MILLIGRAM(S): 5 TABLET ORAL at 09:00

## 2023-01-01 RX ADMIN — OXYCODONE HYDROCHLORIDE 10 MILLIGRAM(S): 5 TABLET ORAL at 06:53

## 2023-01-01 RX ADMIN — ATENOLOL 50 MILLIGRAM(S): 25 TABLET ORAL at 05:20

## 2023-01-01 RX ADMIN — OXYCODONE HYDROCHLORIDE 2.5 MILLIGRAM(S): 5 TABLET ORAL at 13:14

## 2023-01-01 RX ADMIN — PANTOPRAZOLE SODIUM 40 MILLIGRAM(S): 20 TABLET, DELAYED RELEASE ORAL at 06:35

## 2023-01-01 RX ADMIN — Medication 105 MILLIGRAM(S): at 18:04

## 2023-01-01 RX ADMIN — POLYETHYLENE GLYCOL 3350 17 GRAM(S): 17 POWDER, FOR SOLUTION ORAL at 11:07

## 2023-01-01 RX ADMIN — PANTOPRAZOLE SODIUM 40 MILLIGRAM(S): 20 TABLET, DELAYED RELEASE ORAL at 06:25

## 2023-01-01 RX ADMIN — SODIUM CHLORIDE 75 MILLILITER(S): 9 INJECTION INTRAMUSCULAR; INTRAVENOUS; SUBCUTANEOUS at 11:41

## 2023-01-01 RX ADMIN — OXYCODONE HYDROCHLORIDE 10 MILLIGRAM(S): 5 TABLET ORAL at 15:00

## 2023-01-01 RX ADMIN — POLYETHYLENE GLYCOL 3350 17 GRAM(S): 17 POWDER, FOR SOLUTION ORAL at 12:25

## 2023-01-01 RX ADMIN — POTASSIUM PHOSPHATE, MONOBASIC POTASSIUM PHOSPHATE, DIBASIC 62.5 MILLIMOLE(S): 236; 224 INJECTION, SOLUTION INTRAVENOUS at 17:21

## 2023-01-01 RX ADMIN — GABAPENTIN 100 MILLIGRAM(S): 400 CAPSULE ORAL at 21:30

## 2023-01-01 RX ADMIN — GABAPENTIN 100 MILLIGRAM(S): 400 CAPSULE ORAL at 13:16

## 2023-01-01 RX ADMIN — ATORVASTATIN CALCIUM 40 MILLIGRAM(S): 80 TABLET, FILM COATED ORAL at 21:41

## 2023-01-01 RX ADMIN — ATORVASTATIN CALCIUM 40 MILLIGRAM(S): 80 TABLET, FILM COATED ORAL at 20:50

## 2023-01-01 RX ADMIN — OXYCODONE HYDROCHLORIDE 2.5 MILLIGRAM(S): 5 TABLET ORAL at 12:02

## 2023-01-01 RX ADMIN — CHLORHEXIDINE GLUCONATE 1 APPLICATION(S): 213 SOLUTION TOPICAL at 12:26

## 2023-01-01 RX ADMIN — PANTOPRAZOLE SODIUM 40 MILLIGRAM(S): 20 TABLET, DELAYED RELEASE ORAL at 17:01

## 2023-01-01 RX ADMIN — ATORVASTATIN CALCIUM 40 MILLIGRAM(S): 80 TABLET, FILM COATED ORAL at 21:19

## 2023-01-01 RX ADMIN — GABAPENTIN 100 MILLIGRAM(S): 400 CAPSULE ORAL at 13:01

## 2023-01-01 RX ADMIN — OXYCODONE HYDROCHLORIDE 10 MILLIGRAM(S): 5 TABLET ORAL at 20:06

## 2023-01-01 RX ADMIN — GABAPENTIN 100 MILLIGRAM(S): 400 CAPSULE ORAL at 13:25

## 2023-01-01 RX ADMIN — OXYCODONE HYDROCHLORIDE 10 MILLIGRAM(S): 5 TABLET ORAL at 08:41

## 2023-01-01 RX ADMIN — OXYCODONE HYDROCHLORIDE 10 MILLIGRAM(S): 5 TABLET ORAL at 04:02

## 2023-01-01 RX ADMIN — ATORVASTATIN CALCIUM 40 MILLIGRAM(S): 80 TABLET, FILM COATED ORAL at 21:34

## 2023-01-01 RX ADMIN — OXYCODONE HYDROCHLORIDE 10 MILLIGRAM(S): 5 TABLET ORAL at 14:00

## 2023-01-01 RX ADMIN — OXYCODONE HYDROCHLORIDE 10 MILLIGRAM(S): 5 TABLET ORAL at 21:46

## 2023-01-01 RX ADMIN — OXYCODONE HYDROCHLORIDE 5 MILLIGRAM(S): 5 TABLET ORAL at 19:22

## 2023-01-01 RX ADMIN — OXYCODONE HYDROCHLORIDE 5 MILLIGRAM(S): 5 TABLET ORAL at 10:18

## 2023-01-01 RX ADMIN — ENOXAPARIN SODIUM 60 MILLIGRAM(S): 100 INJECTION SUBCUTANEOUS at 18:19

## 2023-01-01 RX ADMIN — OXYCODONE HYDROCHLORIDE 10 MILLIGRAM(S): 5 TABLET ORAL at 06:45

## 2023-01-01 RX ADMIN — GABAPENTIN 100 MILLIGRAM(S): 400 CAPSULE ORAL at 14:15

## 2023-01-01 RX ADMIN — OXYCODONE HYDROCHLORIDE 10 MILLIGRAM(S): 5 TABLET ORAL at 08:04

## 2023-01-01 RX ADMIN — GABAPENTIN 100 MILLIGRAM(S): 400 CAPSULE ORAL at 13:46

## 2023-01-01 RX ADMIN — CHLORHEXIDINE GLUCONATE 1 APPLICATION(S): 213 SOLUTION TOPICAL at 14:02

## 2023-01-01 RX ADMIN — PANTOPRAZOLE SODIUM 40 MILLIGRAM(S): 20 TABLET, DELAYED RELEASE ORAL at 17:31

## 2023-01-01 RX ADMIN — Medication 0.5 MILLIGRAM(S): at 17:44

## 2023-01-01 RX ADMIN — OXYCODONE HYDROCHLORIDE 10 MILLIGRAM(S): 5 TABLET ORAL at 14:15

## 2023-01-01 RX ADMIN — PANTOPRAZOLE SODIUM 40 MILLIGRAM(S): 20 TABLET, DELAYED RELEASE ORAL at 05:40

## 2023-01-01 RX ADMIN — ATENOLOL 50 MILLIGRAM(S): 25 TABLET ORAL at 06:09

## 2023-01-01 RX ADMIN — DULOXETINE HYDROCHLORIDE 20 MILLIGRAM(S): 30 CAPSULE, DELAYED RELEASE ORAL at 12:03

## 2023-01-01 RX ADMIN — ATENOLOL 25 MILLIGRAM(S): 25 TABLET ORAL at 06:45

## 2023-01-01 RX ADMIN — GABAPENTIN 100 MILLIGRAM(S): 400 CAPSULE ORAL at 06:09

## 2023-01-01 RX ADMIN — PANTOPRAZOLE SODIUM 40 MILLIGRAM(S): 20 TABLET, DELAYED RELEASE ORAL at 18:03

## 2023-01-01 RX ADMIN — ATENOLOL 50 MILLIGRAM(S): 25 TABLET ORAL at 05:46

## 2023-01-01 RX ADMIN — SENNA PLUS 2 TABLET(S): 8.6 TABLET ORAL at 21:41

## 2023-01-01 RX ADMIN — SODIUM CHLORIDE 75 MILLILITER(S): 9 INJECTION INTRAMUSCULAR; INTRAVENOUS; SUBCUTANEOUS at 21:34

## 2023-01-01 RX ADMIN — SODIUM CHLORIDE 125 MILLILITER(S): 9 INJECTION, SOLUTION INTRAVENOUS at 22:45

## 2023-01-01 RX ADMIN — OXYCODONE HYDROCHLORIDE 10 MILLIGRAM(S): 5 TABLET ORAL at 03:02

## 2023-01-01 RX ADMIN — PANTOPRAZOLE SODIUM 40 MILLIGRAM(S): 20 TABLET, DELAYED RELEASE ORAL at 05:20

## 2023-01-01 RX ADMIN — ATENOLOL 50 MILLIGRAM(S): 25 TABLET ORAL at 05:22

## 2023-01-01 RX ADMIN — SODIUM CHLORIDE 1000 MILLILITER(S): 9 INJECTION, SOLUTION INTRAVENOUS at 21:44

## 2023-01-01 RX ADMIN — Medication 1: at 16:53

## 2023-01-01 RX ADMIN — CALCITONIN SALMON 220 INTERNATIONAL UNIT(S): 200 INJECTION, SOLUTION INTRAMUSCULAR at 21:44

## 2023-01-01 RX ADMIN — Medication 400 MILLIGRAM(S): at 21:43

## 2023-01-01 RX ADMIN — METHADONE HYDROCHLORIDE 2.5 MILLIGRAM(S): 40 TABLET ORAL at 07:04

## 2023-01-01 RX ADMIN — OXYCODONE HYDROCHLORIDE 10 MILLIGRAM(S): 5 TABLET ORAL at 05:46

## 2023-01-01 RX ADMIN — CHLORHEXIDINE GLUCONATE 1 APPLICATION(S): 213 SOLUTION TOPICAL at 13:01

## 2023-01-01 RX ADMIN — GABAPENTIN 100 MILLIGRAM(S): 400 CAPSULE ORAL at 20:51

## 2023-01-01 RX ADMIN — OXYCODONE HYDROCHLORIDE 10 MILLIGRAM(S): 5 TABLET ORAL at 13:00

## 2023-01-01 RX ADMIN — SODIUM CHLORIDE 125 MILLILITER(S): 9 INJECTION, SOLUTION INTRAVENOUS at 05:12

## 2023-01-01 RX ADMIN — ATORVASTATIN CALCIUM 40 MILLIGRAM(S): 80 TABLET, FILM COATED ORAL at 21:29

## 2023-01-01 RX ADMIN — DULOXETINE HYDROCHLORIDE 20 MILLIGRAM(S): 30 CAPSULE, DELAYED RELEASE ORAL at 14:16

## 2023-01-01 RX ADMIN — GABAPENTIN 100 MILLIGRAM(S): 400 CAPSULE ORAL at 06:25

## 2023-01-01 RX ADMIN — DULOXETINE HYDROCHLORIDE 20 MILLIGRAM(S): 30 CAPSULE, DELAYED RELEASE ORAL at 12:30

## 2023-01-01 RX ADMIN — OXYCODONE HYDROCHLORIDE 10 MILLIGRAM(S): 5 TABLET ORAL at 20:30

## 2023-01-01 RX ADMIN — PANTOPRAZOLE SODIUM 40 MILLIGRAM(S): 20 TABLET, DELAYED RELEASE ORAL at 05:22

## 2023-01-01 RX ADMIN — Medication 0.5 MILLIGRAM(S): at 20:15

## 2023-01-01 RX ADMIN — PANTOPRAZOLE SODIUM 40 MILLIGRAM(S): 20 TABLET, DELAYED RELEASE ORAL at 20:14

## 2023-01-01 RX ADMIN — SODIUM CHLORIDE 1000 MILLILITER(S): 9 INJECTION INTRAMUSCULAR; INTRAVENOUS; SUBCUTANEOUS at 20:00

## 2023-01-01 RX ADMIN — DULOXETINE HYDROCHLORIDE 20 MILLIGRAM(S): 30 CAPSULE, DELAYED RELEASE ORAL at 13:00

## 2023-01-01 RX ADMIN — ENOXAPARIN SODIUM 60 MILLIGRAM(S): 100 INJECTION SUBCUTANEOUS at 06:24

## 2023-01-01 RX ADMIN — SODIUM CHLORIDE 1000 MILLILITER(S): 9 INJECTION, SOLUTION INTRAVENOUS at 02:24

## 2023-01-01 RX ADMIN — OXYCODONE HYDROCHLORIDE 10 MILLIGRAM(S): 5 TABLET ORAL at 12:44

## 2023-01-01 RX ADMIN — DULOXETINE HYDROCHLORIDE 20 MILLIGRAM(S): 30 CAPSULE, DELAYED RELEASE ORAL at 11:38

## 2023-01-01 RX ADMIN — OXYCODONE HYDROCHLORIDE 10 MILLIGRAM(S): 5 TABLET ORAL at 20:50

## 2023-01-01 RX ADMIN — SENNA PLUS 2 TABLET(S): 8.6 TABLET ORAL at 22:14

## 2023-01-01 RX ADMIN — Medication 105 MILLIGRAM(S): at 14:15

## 2023-01-01 RX ADMIN — SODIUM CHLORIDE 1000 MILLILITER(S): 9 INJECTION INTRAMUSCULAR; INTRAVENOUS; SUBCUTANEOUS at 21:10

## 2023-01-01 RX ADMIN — OXYCODONE HYDROCHLORIDE 10 MILLIGRAM(S): 5 TABLET ORAL at 21:30

## 2023-01-01 RX ADMIN — Medication 1: at 12:48

## 2023-01-01 RX ADMIN — OXYCODONE HYDROCHLORIDE 5 MILLIGRAM(S): 5 TABLET ORAL at 11:38

## 2023-01-01 RX ADMIN — METHADONE HYDROCHLORIDE 2.5 MILLIGRAM(S): 40 TABLET ORAL at 06:24

## 2023-01-01 RX ADMIN — SODIUM CHLORIDE 125 MILLILITER(S): 9 INJECTION, SOLUTION INTRAVENOUS at 06:02

## 2023-01-01 RX ADMIN — GABAPENTIN 100 MILLIGRAM(S): 400 CAPSULE ORAL at 05:40

## 2023-01-01 RX ADMIN — GABAPENTIN 100 MILLIGRAM(S): 400 CAPSULE ORAL at 06:47

## 2023-01-01 RX ADMIN — ATENOLOL 25 MILLIGRAM(S): 25 TABLET ORAL at 01:54

## 2023-01-01 RX ADMIN — OXYCODONE HYDROCHLORIDE 10 MILLIGRAM(S): 5 TABLET ORAL at 20:10

## 2023-01-01 RX ADMIN — PANTOPRAZOLE SODIUM 40 MILLIGRAM(S): 20 TABLET, DELAYED RELEASE ORAL at 06:48

## 2023-01-01 RX ADMIN — CEFTRIAXONE 100 MILLIGRAM(S): 500 INJECTION, POWDER, FOR SOLUTION INTRAMUSCULAR; INTRAVENOUS at 22:47

## 2023-01-01 RX ADMIN — ATENOLOL 50 MILLIGRAM(S): 25 TABLET ORAL at 05:40

## 2023-06-06 PROBLEM — L85.3 XEROSIS CUTIS: Status: ACTIVE | Noted: 2023-01-01

## 2023-06-06 PROBLEM — L30.9 DERMATITIS: Status: ACTIVE | Noted: 2023-01-01

## 2023-08-14 PROBLEM — L82.1 OTHER SEBORRHEIC KERATOSIS: Status: ACTIVE | Noted: 2023-01-01

## 2023-08-14 PROBLEM — L91.8 SKIN TAG: Status: ACTIVE | Noted: 2023-01-01

## 2023-08-31 PROBLEM — K86.89 DILATED PANCREATIC DUCT: Status: ACTIVE | Noted: 2023-01-01

## 2023-09-05 NOTE — ASU PATIENT PROFILE, ADULT - IS PATIENT PREGNANT?
To Dr. Sanchez  LV: 02/07/2023 - follow up in 6 months  Last refill: 1/17/2023    Please advise   no

## 2023-09-05 NOTE — ASU DISCHARGE PLAN (ADULT/PEDIATRIC) - NS MD DC FALL RISK RISK
For information on Fall & Injury Prevention, visit: [email or web address] OR  [email or web address] OR  https://www.cdc.gov/steadi/patient.html

## 2023-09-05 NOTE — ASU PATIENT PROFILE, ADULT - FALL HARM RISK - HARM RISK INTERVENTIONS

## 2023-09-10 NOTE — HISTORY OF PRESENT ILLNESS
[de-identified] : This is a non-billable note*        [Mr/Ms Fn Ln] is a [age] old [male/female] who presents for evaluation in our [facility]. Her PMH includes; HTN, HLD       c/o weight loss in 2023 noted w/    mild pancreatitis is setting of divisum  without a clearly defined panc mass and now panc mass with duct dilatation.     Liver lesions are new (Multifocal and Bilateral)       CT A/P w/ IC 3/10/23 showed an 8.2 x 1.4 cm soft tissue density lesion along the right anterior abdominal wall musculature with probable adjacent prominent vessels. This is of uncertain etiology. Malignancy is a main consideration. Pre and post contrast-enhanced MRI is recommended for further characterization of this finding.   Mildly prominent pancreatic duct is again noted. No definite pancreatic mass.   Mild infiltration of the peripancreatic fat raises concern for pancreatitis. No peripancreatic fluid collections. Correlate with laboratory values   1.6 x 0.5 cm right hepatic hypodensity is of uncertain etiology   2.6 cm low density left adrenal lesion is not characterized on this study due to the presence of intravenous contrast.   Bilateral indeterminate renal lesions as above. Hepatic, left adrenal and renal lesions can also be further characterized on pre and postcontrast enhanced MRI.      MRI 3/15/23 showed right anterior abdominal wall expansile complex conglomerate, which appears heterogeneous, containing tatty and non-fatty components, which is indeterminate, with differential diagnosis including lipoma and liposarcoma; other diagnostic considerations include hemangioma desmoid, sarcoma, and metastasis, although these are felt less likely due to lack of appreciable enhancement; recommend image-guided biopsy and/or PET/CT.   Left adrenal adenoma as described above.   Bilateral renal cysts as described above.         MRCP 23 showed interval development of conglomerate in region of ampulla, measuring up to 4-5 cm. Interval increase in dilatation of main pancreatic dud, measuring up to 5-6 mm (previously 3-4 mm). Interval development of multiple scattered hepatic lesions, measuring up to 2-3 cm, largest in left lateral lobe, which appear ll-defined with diffusion restriction.   Findings are suspicious for malignancy with metastases. Recommend ERCP, BUS, and PET/CT.         Family Ca hx: brother w/ ca (unsure which type)    ECO          Labs:   23    AST: 14    ALT:   14  ALP:  102  Tbili: 0.4     Pending Ca 19-9:  CEA:            Labs:  Ca 19-9:    CEA:    AST:    ALT:    ALP:     Tbili:    [TextBox_4] : 8/25/23  [Before Surgery] : before surgery [Nutrition] : Nutrition [Social Work] : Social Work [Fully active, able to carry on all pre-disease performance without restriction] : Status 0 - Fully active, able to carry on all pre-disease performance without restriction [TextBox_5] : 9/12/23  [Abdominal Pain] : abdominal pain [EUS] : EUS [Biopsy] : biopsy performed [Pancreatic ductal adenocarcinoma] : Pancreatic ductal adenocarcinoma [Metastatic] : metastatic

## 2023-09-14 PROBLEM — Z87.891 FORMER SMOKER: Status: ACTIVE | Noted: 2023-01-01

## 2023-09-14 PROBLEM — Z86.39 HISTORY OF HYPERLIPIDEMIA: Status: RESOLVED | Noted: 2023-01-01 | Resolved: 2023-01-01

## 2023-09-14 PROBLEM — Z86.79 HISTORY OF HYPERTENSION: Status: RESOLVED | Noted: 2023-01-01 | Resolved: 2023-01-01

## 2023-09-14 PROBLEM — Z78.9 SOCIAL ALCOHOL USE: Status: ACTIVE | Noted: 2023-01-01

## 2023-09-14 PROBLEM — Z80.1 FAMILY HISTORY OF LUNG CANCER: Status: ACTIVE | Noted: 2023-01-01

## 2023-09-14 PROBLEM — Z86.59 HISTORY OF ANXIETY: Status: RESOLVED | Noted: 2023-01-01 | Resolved: 2023-01-01

## 2023-09-20 NOTE — ED ADULT TRIAGE NOTE - ARRIVAL FROM
Home Azithromycin Pregnancy And Lactation Text: This medication is considered safe during pregnancy and is also secreted in breast milk.

## 2023-09-20 NOTE — H&P ADULT - ASSESSMENT
Have Your Spot(S) Been Treated In The Past?: has not been treated
Hpi Title: Evaluation of Skin Lesions
78y F newly dx'd pancreatic CA (not  yet on tx ) p/w weakness, black stools and dark urine x 3-4 days, jaundice, and  worsening abm pain, being admitted for further care

## 2023-09-20 NOTE — H&P ADULT - PROBLEM SELECTOR PLAN 1
- New dx'd Pancreatic cancer   - C/o juandice, dark stool & urine. Noted to have elevated LFT, hyperbilirubinemia on labs,   which are likely 2/2 known pancreatic Ca    - Recent CT showing peripancreatic adenopathy and hepatic metastases w/ hepatic   lesions increase in size and number as compared with the prior MRI of  08/25/2023  - Has yet to start Tx for Ca, follows Dr Velázquez O/P. Will consult Onco, f/u recs  -  GI consult for eval of possible endoscopic intervention

## 2023-09-20 NOTE — ED PROVIDER NOTE - RAPID ASSESSMENT
79 yo female newly diagnosed pancreatic CA (no current treatment yet) p/w weakness, black stools and dark urine x 3-4 days. Has abd pain x 3 weeks, had endoscopy and was dx w/ pancreatic CA. Additionally has notice jaundice. No fever/chills, cp, sob, abd pain.   Oncologist Dr. Velázquez    **Patient was rapidly assessed by Igor kaur Kearney, PA-C. A limited history was obtained. The patient will be seen and further examined and worked up in the main ED and their care will be completed by the main ED team. Receiving team will follow up on labs, analgesia, any clinical imaging, and perform reassessment and disposition of the patient as clinically indicated. All decisions regarding the progression of care will be made at their discretion. 79 yo female newly diagnosed pancreatic CA (no current treatment yet) p/w weakness, black stools and dark urine x 3-4 days. Has abd pain x 3 weeks, had endoscopy and was dx w/ pancreatic CA. Additionally has noticed jaundice. No fever/chills, cp, sob, abd pain.   Sent in by Oncologist Dr. Velázquez    **Patient was rapidly assessed by Igor kaur Kearney, PA-C. A limited history was obtained. The patient will be seen and further examined and worked up in the main ED and their care will be completed by the main ED team. Receiving team will follow up on labs, analgesia, any clinical imaging, and perform reassessment and disposition of the patient as clinically indicated. All decisions regarding the progression of care will be made at their discretion.

## 2023-09-20 NOTE — H&P ADULT - NSHPLABSRESULTS_GEN_ALL_CORE
11.6   13.20 )-----------( 340      ( 20 Sep 2023 19:33 )             34.7       09-20    134<L>  |  97  |  22  ----------------------------<  223<H>  4.1   |  23  |  0.71    Ca    11.6<H>      20 Sep 2023 19:33    TPro  7.2  /  Alb  3.7  /  TBili  5.1<H>  /  DBili  x   /  AST  163<H>  /  ALT  355<H>  /  AlkPhos  498<H>  09-20      - - - - - - - - - - - - - - - - - - - - - - - - - - - - - - - - - - - - - - - - - - - - - - - - - - - -       EKG personally reviewed:      Images personally reviewed:     < from: CT Abdomen and Pelvis w/ IV Cont (09.16.23 @ 11:44) >  IMPRESSION:  Pancreatic head mass consistent with known pancreatic cancer with   associated peripancreatic adenopathy and hepatic metastases. The hepatic   lesions and increase in size and number as compared with the prior MRI of   08/25/2023.    Mild-to-moderate intra and extrahepatic biliary dilatation.

## 2023-09-20 NOTE — ED PROVIDER NOTE - CLINICAL SUMMARY MEDICAL DECISION MAKING FREE TEXT BOX
pancreatic ca, rising bilirubin, concern for obstruction.  d/w heme once, d/w GI --> admit for further management.   @ bedside for collateral.

## 2023-09-20 NOTE — H&P ADULT - PROBLEM SELECTOR PLAN 5
-  Elevated BG - Elevated BG, no prior hx/o DM.   - Hyperglycemia likely iso Pancreatic Ca   - Check FS, insulin as indicated    - Check A1c

## 2023-09-20 NOTE — ED ADULT NURSE NOTE - OBJECTIVE STATEMENT
78 y.o female c/o dark stool and dark urine x3-4 days. Endorses abdominal pain x 3 weeks. Pt states recently diagnosed with pancreatic CA, not currently on treatment. States has port placement appointment for 10/2. Denies fevers, CP, SOB.

## 2023-09-20 NOTE — ED PROVIDER NOTE - PHYSICAL EXAMINATION
GEN: calm, cooperative  EYES: EOM intact  ENT: mucous membranes moist  HEAD: NCAT  CV: regular  RESP: no respiratory distress  ABD: no abdominal distension  MSK: moves all extremities  NEURO: awake, alert, oriented  PSYCH: affect normal  SKIN: mild jaundice

## 2023-09-20 NOTE — H&P ADULT - NSHPPHYSICALEXAM_GEN_ALL_CORE
T(C): 37.1 (09-20-23 @ 23:05), Max: 37.1 (09-20-23 @ 23:05)  HR: 68 (09-20-23 @ 23:05) (68 - 96)  BP: 151/69 (09-20-23 @ 23:05) (115/60 - 151/69)  RR: 18 (09-20-23 @ 23:05) (17 - 18)  SpO2: 97% (09-20-23 @ 23:05) (96% - 98%)    CONSTITUTIONAL: Well groomed, no apparent distress  EYES: PERRLA and symmetric, EOMI  ENMT: MMM. Normal dentition  RESP: No respiratory distress, no use of accessory muscles; CTA b/l, no WRR  CV: +S1S2, RRR, no MRG; no peripheral edema  GI: Soft, mild Tender to deep palpation, no RGR; no palpable masses  MSK: Normal gait, though slow; No digital clubbing or cyanosis; normal pain free ROM x4 extremities   SKIN: No rashes or ulcers noted  NEURO: CN II-XII grossly intact; normal reflexes, sensation intact throughout   PSYCH: Appropriate insight/judgment; A+O x 3, mood and affect appropriate

## 2023-09-20 NOTE — ED PROVIDER NOTE - OBJECTIVE STATEMENT
79 yo female hx of pancreatic CA metastatic to liver p/w dark urine, dark stool x several days, abdominal pain.  denies fever, no LOC, no recent illness.  sent by heme onc for further management.

## 2023-09-20 NOTE — H&P ADULT - HISTORY OF PRESENT ILLNESS
78y F newly dx'd pancreatic CA (not  yet on tx ) p/w weakness, black stools and dark urine x 3-4 days. Also endorse abd pain x 3 weeks and jaundice. Abm pain now only moderately relieved w/ percocet at home. Follows onco Dr Velázquez O/P, spoke to a staff member  at the office today who  advised her to come to ED for further eval.       ROS: Denies CP, SOB, palpitation, fever, cough, chills, dizziness, recent travel, sick contact    A 10-system ROS was performed and is negative except as noted above and/or in the HPI.

## 2023-09-20 NOTE — H&P ADULT - PROBLEM SELECTOR PLAN 3
- Oxycodone 5mg/10mg q4prn for  Mod/Sev pain - Oxycodone 5mg/10mg q4prn for  Mod/Sev pain  - prn Morphine for intractable pain - Oxycodone 5mg/10mg q4prn for  Mod/Sev pain  - Prn Morphine for intractable pain

## 2023-09-20 NOTE — ED PROVIDER NOTE - NSICDXFAMILYHX_GEN_ALL_CORE_FT
FAMILY HISTORY:  Mother  Still living? No  Family history of aortic stenosis, Age at diagnosis: Age Unknown  Family history of myelodysplasia, Age at diagnosis: Age Unknown    Sibling  Still living? No  Family history of stomach cancer, Age at diagnosis: Age Unknown

## 2023-09-20 NOTE — ED PROVIDER NOTE - IV ALTEPLASE INCLUSION HIDDEN
show
mom states dgtr has cough & fever since Friday, decreased appetite  awake alert, resp wnl, + congested cough

## 2023-09-20 NOTE — H&P ADULT - PROBLEM SELECTOR PLAN 6
- DVT ppx: Lovenox SQ   - GI ppx: PPI   - Diet: DASH/ CC - DVT ppx: Lovenox SQ   - GI ppx: PPI   - Diet:  CC

## 2023-09-20 NOTE — ED PROVIDER NOTE - NS ED ROS FT
CONSTITUTIONAL: no fevers  HEENT: no dysphagia  CV: no chest pain  RESP: no SOB  GI: + dark stool  : + dark urine  DERM: no rash  MSK: no back pain  NEURO: no LOC  ENDO: no diabetes

## 2023-09-21 NOTE — DIETITIAN INITIAL EVALUATION ADULT - PROBLEM SELECTOR PLAN 5
- Elevated BG, no prior hx/o DM.   - Hyperglycemia likely iso Pancreatic Ca   - Check FS, insulin as indicated    - Check A1c

## 2023-09-21 NOTE — DIETITIAN INITIAL EVALUATION ADULT - PERTINENT MEDS FT
MEDICATIONS  (STANDING):  atenolol  Tablet 50 milliGRAM(s) Oral daily  atorvastatin 40 milliGRAM(s) Oral at bedtime  BUpivacaine 0.25% (Preservative-Free) Injectable 4 milliLiter(s) Local Injection once  dextrose 5%. 1000 milliLiter(s) (50 mL/Hr) IV Continuous <Continuous>  dextrose 5%. 1000 milliLiter(s) (100 mL/Hr) IV Continuous <Continuous>  dextrose 50% Injectable 25 Gram(s) IV Push once  dextrose 50% Injectable 25 Gram(s) IV Push once  dextrose 50% Injectable 12.5 Gram(s) IV Push once  enoxaparin Injectable 40 milliGRAM(s) SubCutaneous every 24 hours  glucagon  Injectable 1 milliGRAM(s) IntraMuscular once  indomethacin Suppository 100 milliGRAM(s) Rectal once  influenza  Vaccine (HIGH DOSE) 0.7 milliLiter(s) IntraMuscular once  insulin lispro (ADMELOG) corrective regimen sliding scale   SubCutaneous three times a day before meals  insulin lispro (ADMELOG) corrective regimen sliding scale   SubCutaneous at bedtime  lactated ringers. 500 milliLiter(s) (30 mL/Hr) IV Continuous <Continuous>  pantoprazole    Tablet 40 milliGRAM(s) Oral before breakfast  sodium chloride 0.9%. 1000 milliLiter(s) (100 mL/Hr) IV Continuous <Continuous>    MEDICATIONS  (PRN):  acetaminophen     Tablet .. 650 milliGRAM(s) Oral every 6 hours PRN Temp greater or equal to 38C (100.4F), Mild Pain (1 - 3)  ALPRAZolam 0.5 milliGRAM(s) Oral at bedtime PRN Anxiety, insomnia  aluminum hydroxide/magnesium hydroxide/simethicone Suspension 30 milliLiter(s) Oral every 4 hours PRN Dyspepsia  dextrose Oral Gel 15 Gram(s) Oral once PRN Blood Glucose LESS THAN 70 milliGRAM(s)/deciliter  melatonin 3 milliGRAM(s) Oral at bedtime PRN Insomnia  morphine  - Injectable 2 milliGRAM(s) IV Push every 4 hours PRN Intractable pain  ondansetron Injectable 4 milliGRAM(s) IV Push every 8 hours PRN Nausea and/or Vomiting  oxyCODONE    IR 10 milliGRAM(s) Oral every 4 hours PRN Severe Pain (7 - 10)  oxyCODONE    IR 5 milliGRAM(s) Oral every 4 hours PRN Moderate Pain (4 - 6)

## 2023-09-21 NOTE — CONSULT NOTE ADULT - SUBJECTIVE AND OBJECTIVE BOX
HPI:  78y F newly dx'd pancreatic CA (not  yet on tx ) p/w weakness, black stools and dark urine x 3-4 days. Also endorse abd pain x 3 weeks and jaundice. Abm pain now only moderately relieved w/ percocet at home. Follows onco Dr Velázquez O/P, spoke to a staff member  at the office today who  advised her to come to ED for further eval.     Patient reports having dark stool and dark urin for about 1 week, associated with weakness and fatigue.       Allergies:  No Known Allergies        Hospital Medications:  acetaminophen     Tablet .. 650 milliGRAM(s) Oral every 6 hours PRN  ALPRAZolam 0.5 milliGRAM(s) Oral at bedtime PRN  aluminum hydroxide/magnesium hydroxide/simethicone Suspension 30 milliLiter(s) Oral every 4 hours PRN  atenolol  Tablet 50 milliGRAM(s) Oral daily  atorvastatin 40 milliGRAM(s) Oral at bedtime  dextrose 5%. 1000 milliLiter(s) IV Continuous <Continuous>  dextrose 5%. 1000 milliLiter(s) IV Continuous <Continuous>  dextrose 50% Injectable 25 Gram(s) IV Push once  dextrose 50% Injectable 12.5 Gram(s) IV Push once  dextrose 50% Injectable 25 Gram(s) IV Push once  dextrose Oral Gel 15 Gram(s) Oral once PRN  enoxaparin Injectable 40 milliGRAM(s) SubCutaneous every 24 hours  glucagon  Injectable 1 milliGRAM(s) IntraMuscular once  influenza  Vaccine (HIGH DOSE) 0.7 milliLiter(s) IntraMuscular once  insulin lispro (ADMELOG) corrective regimen sliding scale   SubCutaneous three times a day before meals  insulin lispro (ADMELOG) corrective regimen sliding scale   SubCutaneous at bedtime  melatonin 3 milliGRAM(s) Oral at bedtime PRN  morphine  - Injectable 2 milliGRAM(s) IV Push every 4 hours PRN  ondansetron Injectable 4 milliGRAM(s) IV Push every 8 hours PRN  oxyCODONE    IR 5 milliGRAM(s) Oral every 4 hours PRN  oxyCODONE    IR 10 milliGRAM(s) Oral every 4 hours PRN  pantoprazole    Tablet 40 milliGRAM(s) Oral before breakfast  sodium chloride 0.9%. 1000 milliLiter(s) IV Continuous <Continuous>      PMHX/PSHX:  No pertinent past medical history    No pertinent past medical history    Insomnia    Pancreas cancer    Fx Wrist left    S/P Bunionectomy bilateral        Family history:  Family history of myelodysplasia (Mother)    Family history of aortic stenosis (Mother)    Family history of stomach cancer (Sibling)        Social History: no smoking    ROS:   General:  No fevers, chills or night sweats  ENT:  No sore throat or dysphagia  CV:  No pain or palpitations  Resp:  No dyspnea, cough or  wheezing  GI:  as above  Skin:  No rash or edema  Neuro: no weakness   Hematologic: no bleeding  Musculoskeletal: no muscle pain or join pain  Psych: no agitation     : no dysuria      PHYSICAL EXAM:   GENERAL:  NAD, Appears stated age  HEENT:  NC/AT,  conjunctivae clear and pink, sclera icterus   CTA B/L, Normal effort  HEART:  RRR S1/S2,  ABDOMEN:  Soft,tender  ES:  No cyanosis or Edema  SKIN:  Warm & Dry. No rash or erythema, +jaundice  NEURO:  Alert, oriented, no focal deficit    Vital Signs:  Vital Signs Last 24 Hrs  T(C): 37.1 (20 Sep 2023 23:05), Max: 37.1 (20 Sep 2023 23:05)  T(F): 98.8 (20 Sep 2023 23:05), Max: 98.8 (20 Sep 2023 23:05)  HR: 78 (21 Sep 2023 06:38) (68 - 96)  BP: 122/92 (21 Sep 2023 06:38) (115/60 - 151/69)  BP(mean): --  RR: 18 (20 Sep 2023 23:05) (17 - 18)  SpO2: 97% (20 Sep 2023 23:05) (96% - 98%)    Parameters below as of 20 Sep 2023 23:05  Patient On (Oxygen Delivery Method): room air      Daily Height in cm: 157.48 (20 Sep 2023 18:42)    Daily     LABS:                        10.4   12.07 )-----------( 284      ( 21 Sep 2023 06:58 )             30.9     Mean Cell Volume: 90.6 fl (09-21-23 @ 06:58)    09-21    133<L>  |  101  |  21  ----------------------------<  138<H>  4.4   |  21<L>  |  0.62    Ca    10.9<H>      21 Sep 2023 06:57    TPro  6.2  /  Alb  3.1<L>  /  TBili  4.4<H>  /  DBili  x   /  AST  147<H>  /  ALT  308<H>  /  AlkPhos  435<H>  09-21    LIVER FUNCTIONS - ( 21 Sep 2023 06:57 )  Alb: 3.1 g/dL / Pro: 6.2 g/dL / ALK PHOS: 435 U/L / ALT: 308 U/L / AST: 147 U/L / GGT: x           PT/INR - ( 21 Sep 2023 06:58 )   PT: 11.7 sec;   INR: 1.07 ratio         PTT - ( 20 Sep 2023 19:33 )  PTT:35.4 sec  Urinalysis Basic - ( 21 Sep 2023 06:57 )    Color: x / Appearance: x / SG: x / pH: x  Gluc: 138 mg/dL / Ketone: x  / Bili: x / Urobili: x   Blood: x / Protein: x / Nitrite: x   Leuk Esterase: x / RBC: x / WBC x   Sq Epi: x / Non Sq Epi: x / Bacteria: x                              10.4   12.07 )-----------( 284      ( 21 Sep 2023 06:58 )             30.9                         11.6   13.20 )-----------( 340      ( 20 Sep 2023 19:33 )             34.7     Imaging:  < from: CT Abdomen and Pelvis w/ IV Cont (09.16.23 @ 11:44) >    IMPRESSION:  Pancreatic head mass consistent with known pancreatic cancer with   associated peripancreatic adenopathy and hepatic metastases. The hepatic   lesions and increase in size and number as compared with the prior MRI of   08/25/2023.    Mild-to-moderate intra and extrahepatic biliary dilatation.    < end of copied text >

## 2023-09-21 NOTE — PRE PROCEDURE NOTE - PRE PROCEDURE EVALUATION
Attending Physician:   Dr Palafox                         Procedure: EGD/ERCP    Indication for Procedure:   #Metastatic pancreatic adenomacarcinoma  #Liver mets  #Dark stool  #Biliary obstruction  ________________________________________________________  PAST MEDICAL & SURGICAL HISTORY:  Insomnia      Pancreas cancer      Fx Wrist left  orif 2006      S/P Bunionectomy bilateral        ALLERGIES:  No Known Allergies    HOME MEDICATIONS:  atenolol 50 mg oral tablet: 1 orally once a day  Endocet 5 mg-325 mg oral tablet: 1 tab(s) orally every 4 hours, As needed, Mild Pain  omeprazole 40 mg oral delayed release capsule: 1 orally 2 times a day  pancrelipase 24,000 units-76,000 units-120,000 units oral delayed release capsule: 2 orally 3 times a day  rosuvastatin 10 mg oral capsule: 1 orally once a day    AICD/PPM: [ ] yes   [ x] no    PERTINENT LAB DATA:                        10.4   12.07 )-----------( 284      ( 21 Sep 2023 06:58 )             30.9     09-21    133<L>  |  101  |  21  ----------------------------<  138<H>  4.4   |  21<L>  |  0.62    Ca    10.9<H>      21 Sep 2023 06:57    TPro  6.2  /  Alb  3.1<L>  /  TBili  4.4<H>  /  DBili  x   /  AST  147<H>  /  ALT  308<H>  /  AlkPhos  435<H>  09-21    PT/INR - ( 21 Sep 2023 06:58 )   PT: 11.7 sec;   INR: 1.07 ratio         PTT - ( 20 Sep 2023 19:33 )  PTT:35.4 sec            PHYSICAL EXAMINATION:    Height (cm): 157.5  Weight (kg): 56.7  BMI (kg/m2): 22.9  BSA (m2): 1.57T(C): 36.8  HR: 62  BP: 138/66  RR: 18  SpO2: 98%    Constitutional: NAD    Neck:  No JVD  Respiratory: no  resp distress   Cardiovascular:rrr  Extremities: No peripheral edema  Neurological: A/O x 3, no focal deficits        COMMENTS:    The patient is a suitable candidate for the planned procedure unless box checked [ ]  No, explain:

## 2023-09-21 NOTE — PROGRESS NOTE ADULT - SUBJECTIVE AND OBJECTIVE BOX
[facility] Division of Hospital Medicine  Louie Lee  MS Teams      SUBJECTIVE / OVERNIGHT EVENTS:  No events overnight  pain controlled with oxycodone 10mg, will continue  denies f/chills/diarrhea/n/v    ADDITIONAL REVIEW OF SYSTEMS:    MEDICATIONS  (STANDING):  atenolol  Tablet 50 milliGRAM(s) Oral daily  atorvastatin 40 milliGRAM(s) Oral at bedtime  dextrose 5%. 1000 milliLiter(s) (50 mL/Hr) IV Continuous <Continuous>  dextrose 5%. 1000 milliLiter(s) (100 mL/Hr) IV Continuous <Continuous>  dextrose 50% Injectable 25 Gram(s) IV Push once  dextrose 50% Injectable 12.5 Gram(s) IV Push once  dextrose 50% Injectable 25 Gram(s) IV Push once  enoxaparin Injectable 40 milliGRAM(s) SubCutaneous every 24 hours  glucagon  Injectable 1 milliGRAM(s) IntraMuscular once  influenza  Vaccine (HIGH DOSE) 0.7 milliLiter(s) IntraMuscular once  insulin lispro (ADMELOG) corrective regimen sliding scale   SubCutaneous three times a day before meals  insulin lispro (ADMELOG) corrective regimen sliding scale   SubCutaneous at bedtime  pantoprazole    Tablet 40 milliGRAM(s) Oral before breakfast  sodium chloride 0.9%. 1000 milliLiter(s) (100 mL/Hr) IV Continuous <Continuous>    MEDICATIONS  (PRN):  acetaminophen     Tablet .. 650 milliGRAM(s) Oral every 6 hours PRN Temp greater or equal to 38C (100.4F), Mild Pain (1 - 3)  ALPRAZolam 0.5 milliGRAM(s) Oral at bedtime PRN Anxiety, insomnia  aluminum hydroxide/magnesium hydroxide/simethicone Suspension 30 milliLiter(s) Oral every 4 hours PRN Dyspepsia  dextrose Oral Gel 15 Gram(s) Oral once PRN Blood Glucose LESS THAN 70 milliGRAM(s)/deciliter  melatonin 3 milliGRAM(s) Oral at bedtime PRN Insomnia  morphine  - Injectable 2 milliGRAM(s) IV Push every 4 hours PRN Intractable pain  ondansetron Injectable 4 milliGRAM(s) IV Push every 8 hours PRN Nausea and/or Vomiting  oxyCODONE    IR 5 milliGRAM(s) Oral every 4 hours PRN Moderate Pain (4 - 6)  oxyCODONE    IR 10 milliGRAM(s) Oral every 4 hours PRN Severe Pain (7 - 10)      I&O's Summary    20 Sep 2023 07:01  -  21 Sep 2023 07:00  --------------------------------------------------------  IN: 640 mL / OUT: 0 mL / NET: 640 mL        PHYSICAL EXAM:  Vital Signs Last 24 Hrs  T(C): 36.6 (21 Sep 2023 07:45), Max: 37.1 (20 Sep 2023 23:05)  T(F): 97.8 (21 Sep 2023 07:45), Max: 98.8 (20 Sep 2023 23:05)  HR: 62 (21 Sep 2023 07:45) (62 - 96)  BP: 138/66 (21 Sep 2023 07:45) (115/60 - 151/69)  BP(mean): --  RR: 18 (21 Sep 2023 07:45) (17 - 18)  SpO2: 98% (21 Sep 2023 07:45) (96% - 98%)    Parameters below as of 21 Sep 2023 07:45  Patient On (Oxygen Delivery Method): room air      CONSTITUTIONAL: NAD, well-developed  EYES: PERRLA; sclera icterus  ENMT: Moist oral mucosa, no pharyngeal injection or exudates; normal dentition  RESPIRATORY: Normal respiratory effort; lungs are clear to auscultation bilaterally  CARDIOVASCULAR: Regular rate and rhythm, normal S1 and S2, no murmur; No lower extremity edema; Peripheral pulses are 2+ bilaterally  ABDOMEN: mild ttp, normoactive bowel sounds, no rebound/guarding  MUSCULOSKELETAL: no clubbing or cyanosis of digits; no joint swelling or tenderness to palpation  PSYCH: A+O to person, place, and time; affect appropriate  NEUROLOGY: CN 2-12 are intact and symmetric; no gross sensory deficits   SKIN: No rashes; no palpable lesions    LABS:                        10.4   12.07 )-----------( 284      ( 21 Sep 2023 06:58 )             30.9     09-21    133<L>  |  101  |  21  ----------------------------<  138<H>  4.4   |  21<L>  |  0.62    Ca    10.9<H>      21 Sep 2023 06:57    TPro  6.2  /  Alb  3.1<L>  /  TBili  4.4<H>  /  DBili  x   /  AST  147<H>  /  ALT  308<H>  /  AlkPhos  435<H>  09-21    PT/INR - ( 21 Sep 2023 06:58 )   PT: 11.7 sec;   INR: 1.07 ratio         PTT - ( 20 Sep 2023 19:33 )  PTT:35.4 sec      Urinalysis Basic - ( 21 Sep 2023 06:57 )    Color: x / Appearance: x / SG: x / pH: x  Gluc: 138 mg/dL / Ketone: x  / Bili: x / Urobili: x   Blood: x / Protein: x / Nitrite: x   Leuk Esterase: x / RBC: x / WBC x   Sq Epi: x / Non Sq Epi: x / Bacteria: x

## 2023-09-21 NOTE — PATIENT PROFILE ADULT - PATIENT'S SEXUAL ORIENTATION
Detail Level: Detailed
Add 77190 Cpt? (Important Note: In 2017 The Use Of 72352 Is Being Tracked By Cms To Determine Future Global Period Reimbursement For Global Periods): yes
Heterosexual

## 2023-09-21 NOTE — DIETITIAN INITIAL EVALUATION ADULT - NSFNSGIASSESSMENTFT_GEN_A_CORE
No issues with vomiting, constipation reported at time of visit. States that she occasionally feels nauseous at times. Pt is currently on ondansetron for ns No issues with vomiting, constipation reported at time of visit. States that she occasionally feels nauseous at times. Pt is currently on ondansetron PRN for nausea. States that pt has been having loose bowel movements. Pt is not currently on any bowel regimen. Last BM noted on 9/20 per flow sheets.

## 2023-09-21 NOTE — DIETITIAN INITIAL EVALUATION ADULT - OTHER INFO
- receiving IV d5 and .9% NaCl   - ordered for morphine for pain management   - 9/21 EGD/ERCP   - newly diagnosed pancreatic CA   - 9/21 A1C: 7.7%, POCT ranges: 152. Currently ordered for ISS   - Nutritionally Pertinent labs 9/21: Na: 133 (L), Glucose: 138 (H), Ca: 10.9 (H)

## 2023-09-21 NOTE — DIETITIAN INITIAL EVALUATION ADULT - PERTINENT LABORATORY DATA
09-21    133<L>  |  101  |  21  ----------------------------<  138<H>  4.4   |  21<L>  |  0.62    Ca    10.9<H>      21 Sep 2023 06:57    TPro  6.2  /  Alb  3.1<L>  /  TBili  4.4<H>  /  DBili  x   /  AST  147<H>  /  ALT  308<H>  /  AlkPhos  435<H>  09-21  POCT Blood Glucose.: 152 mg/dL (09-21-23 @ 08:16)  A1C with Estimated Average Glucose Result: 7.7 % (09-21-23 @ 06:59)

## 2023-09-21 NOTE — DIETITIAN INITIAL EVALUATION ADULT - ADD RECOMMEND
1. When medically feasible consider advancing diet to Consistent Carbohydrate w/ No Evening Snack. Fluids deferred to medical team. Diet texture/fluid consistencies based on SLP recommendations.   2. Provide ongoing education as needed.   3. When appropriate, encourage PO intake and honor food preferences as able unless otherwise contraindicated.   4. When medically appropriate, monitor PO intake, diet tolerance, weight trends, labs, and skin integrity and bowel movement regularity.   5. RD remains available upon request and will follow-up per protocol.   6. Malnutrition sticker placed in charts  1. When medically feasible consider advancing diet to Consistent Carbohydrate w/ No Evening Snack. Fluids deferred to medical team. Diet texture/fluid consistencies based on SLP recommendations.   2. If on PO diet, recommend SetuServ Glucose support daily (300 kcal, 16 g protein in each) to optimize intake   3. Provide ongoing education as needed.   4. When appropriate, encourage PO intake and honor food preferences as able unless otherwise contraindicated.   5. When medically appropriate, monitor PO intake, diet tolerance, weight trends, labs, and skin integrity and bowel movement regularity.   6. RD remains available upon request and will follow-up per protocol.   7. Malnutrition sticker placed in charts

## 2023-09-21 NOTE — DIETITIAN INITIAL EVALUATION ADULT - OTHER CALCULATIONS
Based on Standards of Care pt within % IBW thus actual body weight used for all calculations. Needs adjusted for advanced age, malnutrition, and pancreatic CA . Fluid recs per team.  Ideal body weight (49.8kg) used for all calculations. Needs adjusted for advanced age, malnutrition, and pancreatic CA . Fluid recs per team.

## 2023-09-21 NOTE — DIETITIAN INITIAL EVALUATION ADULT - NSFNSNUTRHOMESUPPLEMENTFT_GEN_A_CORE
Pt reports no vitamin/mineral supplementation at home. States that she had recently acquired Ensure, but was unable to tolerate it.

## 2023-09-21 NOTE — DIETITIAN INITIAL EVALUATION ADULT - ORAL INTAKE PTA/DIET HISTORY
Visited pt on 8MON. Per pt states that she has had poor appetite x 1 month. Does not follow any therapeutic diet at home. Pt follows a Kosher diet at home, however states that she doesn't want to be on the Kosher diet. Confirms  Visited pt on 8MON. Per pt states that she has had poor appetite x 1 month. Does not follow any therapeutic diet at home. Pt follows a Kosher diet at home, however states that she doesn't want to be on the Kosher diet. Confirms No known food allergies.

## 2023-09-21 NOTE — DIETITIAN INITIAL EVALUATION ADULT - PHYSCIAL ASSESSMENT
Weight History:   9/20 125 pounds (dosing)  UBW: 140 pounds (per pt)   IBW: 110 pounds   %IBW: 114%    RD to continue to monitor weight trends.     Observed with overt signs and symptoms of muscle or fat wasting. Based on ASPEN guidelines, pt meets criteria for malnutrition.

## 2023-09-21 NOTE — PROGRESS NOTE ADULT - PROBLEM SELECTOR PLAN 5
- Elevated BG, no prior hx/o DM.   - Hyperglycemia likely iso Pancreatic Ca   - A1C 7.7, goal for age group

## 2023-09-21 NOTE — DIETITIAN INITIAL EVALUATION ADULT - REASON FOR ADMISSION
"78y F newly dx'd pancreatic CA (not  yet on tx ) p/w weakness, black stools and dark urine x 3-4 days, jaundice, and  worsening abdominal pain admitted for pain control and obstructive jaundice. "

## 2023-09-21 NOTE — DIETITIAN INITIAL EVALUATION ADULT - EDUCATION DIETARY MODIFICATIONS
When diet is advanced, discussed/Educated pt on the importance of consuming consistent carbohydrates throughout the day. Provided pt with menu and reviewed examples of carbohydrates on the menu. To pair carbohydrates with either a protein or fat during meal times. Pt receptive and agreeable to education./(2) meets goals/outcomes/verbalization

## 2023-09-21 NOTE — PATIENT PROFILE ADULT - FALL HARM RISK - HARM RISK INTERVENTIONS

## 2023-09-21 NOTE — DIETITIAN INITIAL EVALUATION ADULT - ENERGY INTAKE
When RD visited pt this morning, pt was on a Consistent Carbohydrate w/ Evening Snack Diet. However, pt stated that she had not consumed breakfast this morning and was instructed not to do so as she could possible have a procedure today. Current diet order is Diet, NPO (09-21-23 @ 09:56) [Active].       NPO

## 2023-09-21 NOTE — PROGRESS NOTE ADULT - SUBJECTIVE AND OBJECTIVE BOX
Hematology Consult Note  ***recs not final until attending attestation***    Juan Antonio Allison MD PGY-5  Reachable on TEAMS    HPI:  78y F newly dx'd pancreatic CA (not  yet on tx ) p/w weakness, black stools and dark urine x 3-4 days. Also endorse abd pain x 3 weeks and jaundice. Abm pain now only moderately relieved w/ percocet at home. Follows onco Dr Velázquez O/P, spoke to a staff member  at the office today who  advised her to come to ED for further eval. ROS: Denies CP, SOB, palpitation, fever, cough, chills, dizziness, recent travel, sick contact    CT C/A/P on admission- increased liver mets, intra,extrahepatic mild/moderate biliary dilatation 2/2 to pancreatic head mass. S/p EGD/ERCP, w/ concern for bleeding, hemostatic gel placed in area of possible bleeding, unable to stent, celiac nerve plexus block performed. Patient seen and examined. States abdominal pain is much better controlled (2/10 from usually 4-8/10).       Allergies    No Known Allergies    Intolerances        MEDICATIONS  (STANDING):  atenolol  Tablet 50 milliGRAM(s) Oral daily  atorvastatin 40 milliGRAM(s) Oral at bedtime  BUpivacaine 0.25% (Preservative-Free) Injectable 4 milliLiter(s) Local Injection once  dextrose 5%. 1000 milliLiter(s) (100 mL/Hr) IV Continuous <Continuous>  dextrose 5%. 1000 milliLiter(s) (50 mL/Hr) IV Continuous <Continuous>  dextrose 50% Injectable 25 Gram(s) IV Push once  dextrose 50% Injectable 25 Gram(s) IV Push once  dextrose 50% Injectable 12.5 Gram(s) IV Push once  enoxaparin Injectable 40 milliGRAM(s) SubCutaneous every 24 hours  glucagon  Injectable 1 milliGRAM(s) IntraMuscular once  indomethacin Suppository 100 milliGRAM(s) Rectal once  influenza  Vaccine (HIGH DOSE) 0.7 milliLiter(s) IntraMuscular once  insulin lispro (ADMELOG) corrective regimen sliding scale   SubCutaneous three times a day before meals  insulin lispro (ADMELOG) corrective regimen sliding scale   SubCutaneous at bedtime  lactated ringers. 500 milliLiter(s) (30 mL/Hr) IV Continuous <Continuous>  pantoprazole    Tablet 40 milliGRAM(s) Oral before breakfast  sodium chloride 0.9%. 1000 milliLiter(s) (100 mL/Hr) IV Continuous <Continuous>    MEDICATIONS  (PRN):  acetaminophen     Tablet .. 650 milliGRAM(s) Oral every 6 hours PRN Temp greater or equal to 38C (100.4F), Mild Pain (1 - 3)  ALPRAZolam 0.5 milliGRAM(s) Oral at bedtime PRN Anxiety, insomnia  aluminum hydroxide/magnesium hydroxide/simethicone Suspension 30 milliLiter(s) Oral every 4 hours PRN Dyspepsia  dextrose Oral Gel 15 Gram(s) Oral once PRN Blood Glucose LESS THAN 70 milliGRAM(s)/deciliter  melatonin 3 milliGRAM(s) Oral at bedtime PRN Insomnia  morphine  - Injectable 2 milliGRAM(s) IV Push every 4 hours PRN Intractable pain  ondansetron Injectable 4 milliGRAM(s) IV Push every 8 hours PRN Nausea and/or Vomiting  oxyCODONE    IR 5 milliGRAM(s) Oral every 4 hours PRN Moderate Pain (4 - 6)  oxyCODONE    IR 10 milliGRAM(s) Oral every 4 hours PRN Severe Pain (7 - 10)      PAST MEDICAL & SURGICAL HISTORY:  Insomnia      Pancreas cancer      Fx Wrist left  orif 2006      S/P Bunionectomy bilateral          FAMILY HISTORY:  Family history of myelodysplasia (Mother)    Family history of aortic stenosis (Mother)    Family history of stomach cancer (Sibling)        SOCIAL HISTORY: no tobacco use    REVIEW OF SYSTEMS:  All other systems were reviewed and are negative, except as noted    Height (cm): 157.5 (09-21 @ 13:38)  Weight (kg): 56.7 (09-21 @ 13:38)  BMI (kg/m2): 22.9 (09-21 @ 13:38)  BSA (m2): 1.57 (09-21 @ 13:38)    T(F): 98.6 (09-21-23 @ 17:12), Max: 98.8 (09-20-23 @ 23:05)  HR: 66 (09-21-23 @ 17:12)  BP: 122/74 (09-21-23 @ 17:12)  RR: 18 (09-21-23 @ 17:12)  SpO2: 100% (09-21-23 @ 17:12)  Wt(kg): --    Constitutional: NAD, jaundice noted  Eyes: EOMI, sclera icterus noted  Neck: supple  Respiratory: no inc wob  Cardiovascular: RRR,   Gastrointestinal: soft, mildly tender in lower abdomen, mildly distended  Extremities: no cyanosis, no clubbing                          10.4   12.07 )-----------( 284      ( 21 Sep 2023 06:58 )             30.9       09-21    133<L>  |  101  |  21  ----------------------------<  138<H>  4.4   |  21<L>  |  0.62    Ca    10.9<H>      21 Sep 2023 06:57    TPro  6.2  /  Alb  3.1<L>  /  TBili  4.4<H>  /  DBili  x   /  AST  147<H>  /  ALT  308<H>  /  AlkPhos  435<H>  09-21          RADIOLOGY & ADDITIONAL TESTS:  Studies reviewed.     Oncology Consult Note      Juan Antonio Allison MD PGY-5  Reachable on TEAMS    HPI:  78y F newly dx'd pancreatic CA (not  yet on tx ) p/w weakness, black stools and dark urine x 3-4 days. Also endorse abd pain x 3 weeks and jaundice. Abd pain now only moderately relieved w/ percocet at home. Follows onco Dr Velázquez O/P, spoke to a staff member  at the office today who  advised her to come to ED for further eval. ROS: Denies CP, SOB, palpitation, fever, cough, chills, dizziness, recent travel, sick contact    CT C/A/P on admission- increased liver mets, intra,extrahepatic mild/moderate biliary dilatation 2/2 to pancreatic head mass. S/p EGD/ERCP, w/ concern for bleeding, hemostatic gel placed in area of possible bleeding, unable to stent, celiac nerve plexus block performed. Patient seen and examined. States abdominal pain is much better controlled (2/10 from usually 4-8/10).       Allergies    No Known Allergies    Intolerances        MEDICATIONS  (STANDING):  atenolol  Tablet 50 milliGRAM(s) Oral daily  atorvastatin 40 milliGRAM(s) Oral at bedtime  BUpivacaine 0.25% (Preservative-Free) Injectable 4 milliLiter(s) Local Injection once  dextrose 5%. 1000 milliLiter(s) (100 mL/Hr) IV Continuous <Continuous>  dextrose 5%. 1000 milliLiter(s) (50 mL/Hr) IV Continuous <Continuous>  dextrose 50% Injectable 25 Gram(s) IV Push once  dextrose 50% Injectable 25 Gram(s) IV Push once  dextrose 50% Injectable 12.5 Gram(s) IV Push once  enoxaparin Injectable 40 milliGRAM(s) SubCutaneous every 24 hours  glucagon  Injectable 1 milliGRAM(s) IntraMuscular once  indomethacin Suppository 100 milliGRAM(s) Rectal once  influenza  Vaccine (HIGH DOSE) 0.7 milliLiter(s) IntraMuscular once  insulin lispro (ADMELOG) corrective regimen sliding scale   SubCutaneous three times a day before meals  insulin lispro (ADMELOG) corrective regimen sliding scale   SubCutaneous at bedtime  lactated ringers. 500 milliLiter(s) (30 mL/Hr) IV Continuous <Continuous>  pantoprazole    Tablet 40 milliGRAM(s) Oral before breakfast  sodium chloride 0.9%. 1000 milliLiter(s) (100 mL/Hr) IV Continuous <Continuous>    MEDICATIONS  (PRN):  acetaminophen     Tablet .. 650 milliGRAM(s) Oral every 6 hours PRN Temp greater or equal to 38C (100.4F), Mild Pain (1 - 3)  ALPRAZolam 0.5 milliGRAM(s) Oral at bedtime PRN Anxiety, insomnia  aluminum hydroxide/magnesium hydroxide/simethicone Suspension 30 milliLiter(s) Oral every 4 hours PRN Dyspepsia  dextrose Oral Gel 15 Gram(s) Oral once PRN Blood Glucose LESS THAN 70 milliGRAM(s)/deciliter  melatonin 3 milliGRAM(s) Oral at bedtime PRN Insomnia  morphine  - Injectable 2 milliGRAM(s) IV Push every 4 hours PRN Intractable pain  ondansetron Injectable 4 milliGRAM(s) IV Push every 8 hours PRN Nausea and/or Vomiting  oxyCODONE    IR 5 milliGRAM(s) Oral every 4 hours PRN Moderate Pain (4 - 6)  oxyCODONE    IR 10 milliGRAM(s) Oral every 4 hours PRN Severe Pain (7 - 10)      PAST MEDICAL & SURGICAL HISTORY:  Insomnia      Pancreas cancer      Fx Wrist left  orif 2006      S/P Bunionectomy bilateral          FAMILY HISTORY:  Family history of myelodysplasia (Mother)    Family history of aortic stenosis (Mother)    Family history of stomach cancer (Sibling)        SOCIAL HISTORY: no tobacco use    REVIEW OF SYSTEMS:  All other systems were reviewed and are negative, except as noted    Height (cm): 157.5 (09-21 @ 13:38)  Weight (kg): 56.7 (09-21 @ 13:38)  BMI (kg/m2): 22.9 (09-21 @ 13:38)  BSA (m2): 1.57 (09-21 @ 13:38)    T(F): 98.6 (09-21-23 @ 17:12), Max: 98.8 (09-20-23 @ 23:05)  HR: 66 (09-21-23 @ 17:12)  BP: 122/74 (09-21-23 @ 17:12)  RR: 18 (09-21-23 @ 17:12)  SpO2: 100% (09-21-23 @ 17:12)  Wt(kg): --    Constitutional: NAD, jaundice noted  Eyes: EOMI, sclera icterus noted  Neck: supple  Respiratory: no inc wob  Cardiovascular: RRR,   Gastrointestinal: soft, mildly tender in lower abdomen, mildly distended  Extremities: no cyanosis, no clubbing                          10.4   12.07 )-----------( 284      ( 21 Sep 2023 06:58 )             30.9       09-21    133<L>  |  101  |  21  ----------------------------<  138<H>  4.4   |  21<L>  |  0.62    Ca    10.9<H>      21 Sep 2023 06:57    TPro  6.2  /  Alb  3.1<L>  /  TBili  4.4<H>  /  DBili  x   /  AST  147<H>  /  ALT  308<H>  /  AlkPhos  435<H>  09-21          RADIOLOGY & ADDITIONAL TESTS:  Studies reviewed.

## 2023-09-21 NOTE — PROGRESS NOTE ADULT - ASSESSMENT
78y F newly dx'd pancreatic CA (not  yet on tx ) p/w weakness, black stools and dark urine x 3-4 days, jaundice, and  worsening abdominal pain admitted for pain control and obstructive jaundice.

## 2023-09-21 NOTE — CONSULT NOTE ADULT - ATTENDING COMMENTS
Pt known to me from recent diagnosis of pancreatic CA w/ mets to the liver.  She is being worked up by medical oncology for treatment options and found to have hyperbilirubinemia and melenic stool.  I suspect she is bleeding from the mass and there has been progression of the tumor to block the CBD as noted on CT.  Plan for EUS w/ celiac plexus neuroloysis as she has pain that is not being adequately managed by opioids and for biliary decompression.  Risks/benefits/alternatives discussed with the patient.

## 2023-09-21 NOTE — PROGRESS NOTE ADULT - ASSESSMENT
78y F newly dx'd pancreatic CA (not  yet on tx ) p/w weakness, black stools and dark urine x 3-4 days, jaundice, and  worsening abdominal pain admitted for pain control and obstructive jaundice. Oncology consulted for pancreatic cancer.    #pancreatic cancer  - f/w Dr. Velázquez, dx on persistent belly pain-> MRCP 8/25/23 showed hepatic lesions as large as 2-3 cm as well as pancreatic lesion 4-5 cm. EGD/EUS from 9/5/23 showed duodenal invasion with ulceration- bx consistent w/ pancreatic adenocarcinoma, CT C/A/P on this admission showing increased liver mets, intra,extrahepatic mild/moderate biliary dilatation 2/2 to pancreatic head mass w/ bilirubin elevations  - s/p EGD/ERCP 9/21, unable to stent, concern for bleeding, hemostatic gel placed in area of possible bleeding, celiac nerve plexus block performed 9/21  - recommend consult rad-onc for utility of RT to bleeding lesions  - recommend IR for evaluation of drain placement for elevated bilirubin  - oncology will continue to follow 78y F newly dx'd pancreatic CA (not  yet on tx ) p/w weakness, black stools and dark urine x 3-4 days, jaundice, and  worsening abdominal pain admitted for pain control and obstructive jaundice. Oncology consulted for pancreatic cancer.    #pancreatic cancer  - f/w Dr. Velázquez, dx on persistent belly pain-> MRCP 8/25/23 showed hepatic lesions as large as 2-3 cm as well as pancreatic lesion 4-5 cm. EGD/EUS from 9/5/23 showed duodenal invasion with ulceration- bx consistent w/ pancreatic adenocarcinoma, CT C/A/P on this admission showing increased liver mets, intra,extrahepatic mild/moderate biliary dilatation 2/2 to pancreatic head mass w/ bilirubin elevations  - s/p EGD/ERCP 9/21, unable to stent, concern for bleeding, hemostatic gel placed in area of possible bleeding, celiac nerve plexus block performed 9/21  - recommend consult rad-onc for utility of RT to bleeding lesions  - recommend IR for evaluation of percutaneous drain placement for elevated bilirubin  - oncology will continue to follow

## 2023-09-22 NOTE — PROGRESS NOTE ADULT - ASSESSMENT
78y F newly dx'd pancreatic CA (not  yet on tx ) p/w weakness, black stools and dark urine x 3-4 days, jaundice, and  worsening abdominal pain admitted for pain control and obstructive jaundice.     Plan:    # Pancreas cancer w/ hepatic mets/ Biliary obstruction:  - + Transaminitis  - CT showing peripancreatic adenopathy and hepatic metastases w/ hepatic   lesions increase in size and number as compared with the prior MRI of  08/25/2023  - S/p MRCP 8/25/23 showed hepatic lesions as large as 2-3 cm as well as pancreatic lesion 4-5 cm. EGD/EUS from 9/5/23 showed duodenal invasion with ulceration- bx consistent w/ pancreatic adenocarcinoma, CT C/A/P on this admission showing increased liver mets, intra,extrahepatic mild/moderate biliary dilatation 2/2 to pancreatic head mass w/ bilirubin elevations  - S/p EGD/ERCP 9/21, unable to stent, concern for bleeding, hemostatic gel placed in area of possible bleeding, celiac nerve plexus block performed 9/21  - C/w PPI  - Trend CBC's and LFT's  - Pain control  - Diet per GI rec's-> clears  - Rec'ed for IR consult for biliary decompression  - GI and Heme/ Onc following    # Hypercalcemia:  - Mild hyperCa i/s/o malignancy  - S/p IVF  - Avoid dehydration    # HTN/ HLD:  - Trend BP's  - C/w current meds    # Dm2:  - HgbA1c 7.7  - Continue to monitor blood glucose levels  - Sliding Scale    # Anxiety  - C/w current meds    # DVT ppx:  - IPC's  - Lovenox sq    [employer]  [phone]

## 2023-09-22 NOTE — PROGRESS NOTE ADULT - SUBJECTIVE AND OBJECTIVE BOX
Interval Events:   Reports abd pain improved.  Some sore throat after procedure.     Hospital Medications:  acetaminophen     Tablet .. 650 milliGRAM(s) Oral every 6 hours PRN  ALPRAZolam 0.5 milliGRAM(s) Oral at bedtime PRN  aluminum hydroxide/magnesium hydroxide/simethicone Suspension 30 milliLiter(s) Oral every 4 hours PRN  atenolol  Tablet 50 milliGRAM(s) Oral daily  atorvastatin 40 milliGRAM(s) Oral at bedtime  BUpivacaine 0.25% (Preservative-Free) Injectable 4 milliLiter(s) Local Injection once  dextrose 5%. 1000 milliLiter(s) IV Continuous <Continuous>  dextrose 5%. 1000 milliLiter(s) IV Continuous <Continuous>  dextrose 50% Injectable 25 Gram(s) IV Push once  dextrose 50% Injectable 25 Gram(s) IV Push once  dextrose 50% Injectable 12.5 Gram(s) IV Push once  dextrose Oral Gel 15 Gram(s) Oral once PRN  enoxaparin Injectable 40 milliGRAM(s) SubCutaneous every 24 hours  glucagon  Injectable 1 milliGRAM(s) IntraMuscular once  indomethacin Suppository 100 milliGRAM(s) Rectal once  influenza  Vaccine (HIGH DOSE) 0.7 milliLiter(s) IntraMuscular once  insulin lispro (ADMELOG) corrective regimen sliding scale   SubCutaneous three times a day before meals  insulin lispro (ADMELOG) corrective regimen sliding scale   SubCutaneous at bedtime  lactated ringers. 500 milliLiter(s) IV Continuous <Continuous>  melatonin 3 milliGRAM(s) Oral at bedtime PRN  morphine  - Injectable 2 milliGRAM(s) IV Push every 4 hours PRN  ondansetron Injectable 4 milliGRAM(s) IV Push every 8 hours PRN  oxyCODONE    IR 10 milliGRAM(s) Oral every 4 hours PRN  oxyCODONE    IR 5 milliGRAM(s) Oral every 4 hours PRN  pantoprazole    Tablet 40 milliGRAM(s) Oral before breakfast  sodium chloride 0.9%. 1000 milliLiter(s) IV Continuous <Continuous>      ROS: All system reviewed and negative except as mentioned above.    PHYSICAL EXAM:   Vital Signs:  Vital Signs Last 24 Hrs  T(C): 36.4 (22 Sep 2023 00:11), Max: 37 (21 Sep 2023 17:12)  T(F): 97.5 (22 Sep 2023 00:11), Max: 98.6 (21 Sep 2023 17:12)  HR: 62 (22 Sep 2023 00:11) (59 - 87)  BP: 103/61 (22 Sep 2023 00:11) (103/61 - 156/74)  BP(mean): --  RR: 18 (22 Sep 2023 00:11) (15 - 24)  SpO2: 96% (22 Sep 2023 00:11) (94% - 100%)    Parameters below as of 22 Sep 2023 00:11  Patient On (Oxygen Delivery Method): room air      Daily Height in cm: 157.48 (21 Sep 2023 13:38)    Daily     GENERAL:  NAD, Appears stated age  HEENT:  NC/AT,  conjunctivae clear and pink, sclera icterus  CHEST:  Normal Effort, Breath sounds clear  HEART:  RRR, S1 + S2, no murmurs  ABDOMEN:  Soft, non-tender, non-distended, normoactive bowel sounds,  no masses  EXTREMITIES:  no cyanosis or edema  SKIN:  Warm & Dry. No rash or erythema  NEURO:  Alert, oriented, no focal deficit    LABS:                        9.9    11.63 )-----------( 285      ( 22 Sep 2023 07:12 )             29.6     Mean Cell Volume: 89.4 fl (09-22-23 @ 07:12)    09-21    133<L>  |  101  |  21  ----------------------------<  138<H>  4.4   |  21<L>  |  0.62    Ca    10.9<H>      21 Sep 2023 06:57    TPro  6.2  /  Alb  3.1<L>  /  TBili  4.4<H>  /  DBili  x   /  AST  147<H>  /  ALT  308<H>  /  AlkPhos  435<H>  09-21    LIVER FUNCTIONS - ( 21 Sep 2023 06:57 )  Alb: 3.1 g/dL / Pro: 6.2 g/dL / ALK PHOS: 435 U/L / ALT: 308 U/L / AST: 147 U/L / GGT: x           PT/INR - ( 21 Sep 2023 06:58 )   PT: 11.7 sec;   INR: 1.07 ratio         PTT - ( 20 Sep 2023 19:33 )  PTT:35.4 sec  Urinalysis Basic - ( 21 Sep 2023 06:57 )    Color: x / Appearance: x / SG: x / pH: x  Gluc: 138 mg/dL / Ketone: x  / Bili: x / Urobili: x   Blood: x / Protein: x / Nitrite: x   Leuk Esterase: x / RBC: x / WBC x   Sq Epi: x / Non Sq Epi: x / Bacteria: x                              9.9    11.63 )-----------( 285      ( 22 Sep 2023 07:12 )             29.6                         10.4   12.07 )-----------( 284      ( 21 Sep 2023 06:58 )             30.9                         11.6   13.20 )-----------( 340      ( 20 Sep 2023 19:33 )             34.7       Imaging: Images reviewed.

## 2023-09-22 NOTE — PROGRESS NOTE ADULT - ASSESSMENT
#Metastatic pancreatic adenomacarcinoma  #Liver mets  #Dark stool  #Biliary obstruction  -s/p EUS on 9/5 with Ulcerated, likely malignant mass seen in the apex of the duodenum extending to the region of the major papilla which was obliterated by the mass. A 3.8 cm hypoechoic mass was identified in the pancreatic head with  extension to the duodenal wall. Tissue was obtained from this exam, and  results are pending. However, the endosonographic appearance is highly  suspicious for malignancy. Fine needle biopsy performed. A few enlarged lymph nodes were visualized in the celiac region (level 20), peripancreatic region and satnam hepatis region.  -s/p failed ERCP on 9/21 due to distorted anatomy, s/p celiac plexus neurolysis.    Recommendations:  -IR consult for biliary decompression  -PPI BID for now  -Trend hb  -Trend liver enzymes  -Rest per medical team    No further work up from our perspective.     Recommendations preliminary until signed by attending.     Kei Strong MD  Gastroenterology/Hepatology Fellow  1st option: [phone] (text or call), ONLY available from 7:00 am to 5:00 pm.   **Contact on-call GI fellow via answering service ([phone]) from 5pm-7am AND on weekends/holidays**  2nd option: Available via [employer] Teams  3rd option: Pager: [phone]

## 2023-09-22 NOTE — CHART NOTE - NSCHARTNOTEFT_GEN_A_CORE
DC Lovenox, secondary to GIB, ordered venodyne boot. HAROLDO Nguyen.  Lisa Wood NP  Medicine Department

## 2023-09-22 NOTE — PROGRESS NOTE ADULT - SUBJECTIVE AND OBJECTIVE BOX
SUBJECTIVE / OVERNIGHT EVENTS:    Patient seen and examined at bedside. No events noted overnight. Resting comfortably in bed. + mild sore throat this am    --------------------------------------------------------------------------------------------  LABS:                        9.9    11.63 )-----------( 285      ( 22 Sep 2023 07:12 )             29.6     09-22    135  |  100  |  14  ----------------------------<  113<H>  4.5   |  26  |  0.66    Ca    11.1<H>      22 Sep 2023 07:12    TPro  5.7<L>  /  Alb  2.6<L>  /  TBili  4.3<H>  /  DBili  x   /  AST  109<H>  /  ALT  245<H>  /  AlkPhos  342<H>  09-22    PT/INR - ( 21 Sep 2023 06:58 )   PT: 11.7 sec;   INR: 1.07 ratio         PTT - ( 20 Sep 2023 19:33 )  PTT:35.4 sec  CAPILLARY BLOOD GLUCOSE      POCT Blood Glucose.: 125 mg/dL (22 Sep 2023 07:59)  POCT Blood Glucose.: 217 mg/dL (21 Sep 2023 21:24)  POCT Blood Glucose.: 169 mg/dL (21 Sep 2023 18:56)        Urinalysis Basic - ( 22 Sep 2023 07:12 )    Color: x / Appearance: x / SG: x / pH: x  Gluc: 113 mg/dL / Ketone: x  / Bili: x / Urobili: x   Blood: x / Protein: x / Nitrite: x   Leuk Esterase: x / RBC: x / WBC x   Sq Epi: x / Non Sq Epi: x / Bacteria: x        RADIOLOGY & ADDITIONAL TESTS: < from: ERCP (09.21.23 @ 13:07) >  Impression:          - Pancreatic adenocarcinoma s/pceliac plexus neurolysis.                       - Biliary obstruction from pancreatic mass, ampulla distorted from the                        infiltration and ulceration of the mass. Unable to obtain biliary access due                        to distorted anatomy. Purastat was applied to prevent delayed bleeding.  Recommendation:      - Return patient to hospital bee for ongoing care.                       - Continue trend liver enzymes. Consult IR for percutaneous biliary drainage.          - Trend liver enzymes.    < end of copied text >      Imaging Personally Reviewed:  [x] YES  [ ] NO    Consultant(s) Notes Reviewed:  [x] YES  [ ] NO    MEDICATIONS  (STANDING):  atenolol  Tablet 50 milliGRAM(s) Oral daily  atorvastatin 40 milliGRAM(s) Oral at bedtime  BUpivacaine 0.25% (Preservative-Free) Injectable 4 milliLiter(s) Local Injection once  dextrose 5%. 1000 milliLiter(s) (100 mL/Hr) IV Continuous <Continuous>  dextrose 5%. 1000 milliLiter(s) (50 mL/Hr) IV Continuous <Continuous>  dextrose 50% Injectable 25 Gram(s) IV Push once  dextrose 50% Injectable 25 Gram(s) IV Push once  dextrose 50% Injectable 12.5 Gram(s) IV Push once  enoxaparin Injectable 40 milliGRAM(s) SubCutaneous every 24 hours  glucagon  Injectable 1 milliGRAM(s) IntraMuscular once  indomethacin Suppository 100 milliGRAM(s) Rectal once  influenza  Vaccine (HIGH DOSE) 0.7 milliLiter(s) IntraMuscular once  insulin lispro (ADMELOG) corrective regimen sliding scale   SubCutaneous three times a day before meals  insulin lispro (ADMELOG) corrective regimen sliding scale   SubCutaneous at bedtime  lactated ringers. 500 milliLiter(s) (30 mL/Hr) IV Continuous <Continuous>  pantoprazole    Tablet 40 milliGRAM(s) Oral before breakfast  sodium chloride 0.9%. 1000 milliLiter(s) (100 mL/Hr) IV Continuous <Continuous>    MEDICATIONS  (PRN):  acetaminophen     Tablet .. 650 milliGRAM(s) Oral every 6 hours PRN Temp greater or equal to 38C (100.4F), Mild Pain (1 - 3)  ALPRAZolam 0.5 milliGRAM(s) Oral at bedtime PRN Anxiety, insomnia  aluminum hydroxide/magnesium hydroxide/simethicone Suspension 30 milliLiter(s) Oral every 4 hours PRN Dyspepsia  dextrose Oral Gel 15 Gram(s) Oral once PRN Blood Glucose LESS THAN 70 milliGRAM(s)/deciliter  melatonin 3 milliGRAM(s) Oral at bedtime PRN Insomnia  morphine  - Injectable 2 milliGRAM(s) IV Push every 4 hours PRN Intractable pain  ondansetron Injectable 4 milliGRAM(s) IV Push every 8 hours PRN Nausea and/or Vomiting  oxyCODONE    IR 10 milliGRAM(s) Oral every 4 hours PRN Severe Pain (7 - 10)  oxyCODONE    IR 5 milliGRAM(s) Oral every 4 hours PRN Moderate Pain (4 - 6)      Care Discussed with Consultants/Other Providers [x] YES  [ ] NO    Vital Signs Last 24 Hrs  T(C): 36.4 (22 Sep 2023 00:11), Max: 37 (21 Sep 2023 17:12)  T(F): 97.5 (22 Sep 2023 00:11), Max: 98.6 (21 Sep 2023 17:12)  HR: 62 (22 Sep 2023 00:11) (59 - 87)  BP: 103/61 (22 Sep 2023 00:11) (103/61 - 156/74)  BP(mean): --  RR: 18 (22 Sep 2023 00:11) (15 - 24)  SpO2: 96% (22 Sep 2023 00:11) (94% - 100%)    Parameters below as of 22 Sep 2023 00:11  Patient On (Oxygen Delivery Method): room air      I&O's Summary    21 Sep 2023 07:01  -  22 Sep 2023 07:00  --------------------------------------------------------  IN: 0 mL / OUT: 1200 mL / NET: -1200 mL      PHYSICAL EXAM:  GENERAL: NAD, well-developed, comfortable  HEAD:  Atraumatic, Normocephalic  EYES: EOMI, PERRLA, +sclera icterus  NECK: Supple, No JVD  CHEST/LUNG: Clear to auscultation bilaterally; No wheeze  HEART: Regular rate and rhythm; No murmurs, rubs, or gallops  ABDOMEN: Soft, Nontender, Nondistended; Bowel sounds present  NEURO: AAOx3, no focal weakness, 5/5 b/l extremity strength, b/l knee no arthritis, no effusion   EXTREMITIES:  2+ Peripheral Pulses, No clubbing, cyanosis, or edema  SKIN: No rashes or lesions

## 2023-09-22 NOTE — PROGRESS NOTE ADULT - NSPROGADDITIONALINFOA_GEN_ALL_CORE
I have personally seen and examined the patient.  I fully participated in the care of this patient in conjunction with NP.  I have made amendments to the documentation where necessary, and agree with the history, physical exam, and plan as documented by the NP.    Dk Nguyen MD  [facility]  [phone]    #Pancreatic cancer with obstructive jaundice   #Duodenal erosion 2/2 above   -trend lfts; ERCP unsuccessful ; plan for IR guided biliary stent placement monday   -consult rad onc for duodenal erosion due to mass causing gib   -pain control   -ppi bid

## 2023-09-22 NOTE — CONSULT NOTE ADULT - SUBJECTIVE AND OBJECTIVE BOX
Interventional Radiology  Evaluate for Procedure: Biliary drain placement    HPI: 78y Female with Metastatic pancreatic adenomacarcinoma and obstructive jaundice with elevated bilirubin requesting IR biliary drain placement for optimization prior to chemo.     Allergies: No Known Allergies    Medications (Abx/Cardiac/Anticoagulation/Blood Products)  atenolol  Tablet: 50 milliGRAM(s) Oral (09-22 @ 06:29)    Data:  157.5  56.7  T(C): 36.4  HR: 62  BP: 103/61  RR: 18  SpO2: 96%    -WBC 11.63 / HgB 9.9 / Hct 29.6 / Plt 285  -Na 135 / Cl 100 / BUN 14 / Glucose 113  -K 4.5 / CO2 26 / Cr 0.66  - / Alk Phos 342 / T.Bili 4.3  -INR 1.07 / PTT --    Radiology: CT reviewed    Assessment/Plan: 78y Female with Metastatic pancreatic adenomacarcinoma and obstructive jaundice with elevated bilirubin requesting IR biliary drain placement for optimization prior to chemo.     - case reviewed and approved for Monday, 9/25  - please place IR procedure order under Dr. Ibarra  - STAT labs in AM (cbc,coags, bmp, T&S)  - hold AC x 24hrs  - NPO on Sunday, 9/24 at 11pm  - d/w primary team

## 2023-09-22 NOTE — CHART NOTE - NSCHARTNOTEFT_GEN_A_CORE
Ms. Thao is a 77yo with metastatic and locally advanced pancreatic cancer sent to hospital for black stools/ jaundice and dark urine, admitted at [facility] for obstructive jaundice. Hgb on admission was 13.1 (then 11.6 > 10.4 > 9.9), s/p ERCP noting large ulcerated and fungating mass at the duodenum sweep extending to the second portion of the duodenum with evidence of recent bleeding     Per chart review, vitals stable, planned for IR procedure for Monday for biliary drainage as ERCP was aborted as scope could not be passed. H/H downtrending but high and possibly procedure related downtrend. Patient planned for IR drainage on Monday.     In the interm, RadOnc to follow. Please continue trend H/H and depending on H/H, would plan for inpatient transfer to [facility] or outpatient RT for palliation if patient stable and able to follow up in clinic after IR drainage. Long term, priority for chemotherapy given metastatic disease.    Shirley Cowart  Chief Resident, PGY5  Radiation Medicine  TEAMS/ Cell [phone]    Case d/w on call attending Dr. Monteiro

## 2023-09-23 NOTE — CONSULT NOTE ADULT - SUBJECTIVE AND OBJECTIVE BOX
[facility] Neurological [facility]), [facility]        Patient is a 78y old  Female who presents with a chief complaint of black stool (22 Sep 2023 09:09)    Excerpt from H&P,"     78y F newly dx'd pancreatic CA (not  yet on tx ) p/w weakness, black stools and dark urine x 3-4 days. Also endorse abd pain x 3 weeks and jaundice. Abm pain now only moderately relieved w/ percocet at home. Follows onco Dr Velázquez O/P, spoke to a staff member  at the office today who  advised her to come to ED for further eval.       ROS: Denies CP, SOB, palpitation, fever, cough, chills, dizziness, recent travel, sick contact    A 10-system ROS was performed and is negative except as noted above and/or in the HPI.   (20 Sep 2023 22:49)           *****PAST MEDICAL / Surgical  HISTORY:  PAST MEDICAL & SURGICAL HISTORY:  Insomnia      Pancreas cancer      Fx Wrist left  orif 2006      S/P Bunionectomy bilateral               *****FAMILY HISTORY:  FAMILY HISTORY:  Family history of myelodysplasia (Mother)    Family history of aortic stenosis (Mother)    Family history of stomach cancer (Sibling)             *****SOCIAL HISTORY:  Alcohol: None  Smoking: None         *****ALLERGIES:   Allergies    No Known Allergies    Intolerances             *****MEDICATIONS: current medication reviewed and documented.   MEDICATIONS  (STANDING):  atenolol  Tablet 50 milliGRAM(s) Oral daily  atorvastatin 40 milliGRAM(s) Oral at bedtime  BUpivacaine 0.25% (Preservative-Free) Injectable 4 milliLiter(s) Local Injection once  dextrose 5%. 1000 milliLiter(s) (50 mL/Hr) IV Continuous <Continuous>  dextrose 5%. 1000 milliLiter(s) (100 mL/Hr) IV Continuous <Continuous>  dextrose 50% Injectable 25 Gram(s) IV Push once  dextrose 50% Injectable 12.5 Gram(s) IV Push once  dextrose 50% Injectable 25 Gram(s) IV Push once  glucagon  Injectable 1 milliGRAM(s) IntraMuscular once  indomethacin Suppository 100 milliGRAM(s) Rectal once  influenza  Vaccine (HIGH DOSE) 0.7 milliLiter(s) IntraMuscular once  insulin lispro (ADMELOG) corrective regimen sliding scale   SubCutaneous three times a day before meals  insulin lispro (ADMELOG) corrective regimen sliding scale   SubCutaneous at bedtime  pantoprazole    Tablet 40 milliGRAM(s) Oral two times a day  polyethylene glycol 3350 17 Gram(s) Oral once  senna 2 Tablet(s) Oral at bedtime  sodium chloride 0.9%. 1000 milliLiter(s) (75 mL/Hr) IV Continuous <Continuous>    MEDICATIONS  (PRN):  acetaminophen     Tablet .. 650 milliGRAM(s) Oral every 6 hours PRN Temp greater or equal to 38C (100.4F), Mild Pain (1 - 3)  ALPRAZolam 0.5 milliGRAM(s) Oral at bedtime PRN Anxiety, insomnia  aluminum hydroxide/magnesium hydroxide/simethicone Suspension 30 milliLiter(s) Oral every 4 hours PRN Dyspepsia  dextrose Oral Gel 15 Gram(s) Oral once PRN Blood Glucose LESS THAN 70 milliGRAM(s)/deciliter  melatonin 3 milliGRAM(s) Oral at bedtime PRN Insomnia  morphine  - Injectable 2 milliGRAM(s) IV Push every 4 hours PRN Intractable pain  ondansetron Injectable 4 milliGRAM(s) IV Push every 8 hours PRN Nausea and/or Vomiting  oxyCODONE    IR 5 milliGRAM(s) Oral every 4 hours PRN Moderate Pain (4 - 6)  oxyCODONE    IR 10 milliGRAM(s) Oral every 4 hours PRN Severe Pain (7 - 10)           *****REVIEW OF SYSTEM:  GEN: no fever, no chills, no pain  RESP: no SOB, no cough, no sputum  CVS: no chest pain, no palpitations, no edema  GI: no abdominal pain, no nausea, no vomiting, no constipation, no diarrhea  : no dysurea, no frequency, no hematurea  Neuro: no headache, no dizziness  PSYCH: no anxiety, no depression  Derm : no itching, no rash         *****VITAL SIGNS:  T(C): 36.7 (23 @ 08:25), Max: 37.1 (23 @ 21:19)  HR: 71 (23 @ 08:25) (71 - 80)  BP: 141/60 (23 @ 08:25) (112/86 - 141/60)  RR: 18 (23 @ 08:25) (18 - 18)  SpO2: 99% (23 @ 08:25) (94% - 100%)  Wt(kg): --           *****PHYSICAL EXAM:   Alert oriented x 2   Attention comprehension are limited    Able to name, repeat,   without any difficulty.   Able to follow 1 step commands.     EOMI fundi not visualized, blinks to threat   No facial asymmetry   Tongue is midline    Moving all 4 ext symmetrically no pronator drift   Reflexes are diminished  throughout   sensation is limited    Gait : not assessed.  B/L down going toes               *****LAB AND IMAGIN.6    10.68 )-----------( 280      ( 23 Sep 2023 07:24 )             29.1                   134<L>  |  100  |  21  ----------------------------<  120<H>  4.1   |  24  |  0.74    Ca    10.6<H>      23 Sep 2023 07:13    TPro  6.0  /  Alb  3.0<L>  /  TBili  4.5<H>  /  DBili  x   /  AST  94<H>  /  ALT  213<H>  /  AlkPhos  363<H>                              Urinalysis Basic - ( 23 Sep 2023 07:13 )    Color: x / Appearance: x / SG: x / pH: x  Gluc: 120 mg/dL / Ketone: x  / Bili: x / Urobili: x   Blood: x / Protein: x / Nitrite: x   Leuk Esterase: x / RBC: x / WBC x   Sq Epi: x / Non Sq Epi: x / Bacteria: x        [All pertinent recent Imaging reports reviewed]         *****A S S E S S M E N T   A N D   P L A N :  Excerpt from H&P,"     78y F newly dx'd pancreatic CA (not  yet on tx ) p/w weakness, black stools and dark urine x 3-4 days. Also endorse abd pain x 3 weeks and jaundice. Abm pain now only moderately relieved w/ percocet at home. Follows onco Dr Velázquez O/P, spoke to a staff member  at the office today who  advised her to come to ED for further eval.       ROS: Denies CP, SOB, palpitation, fever, cough, chills, dizziness, recent travel, sick contact    A 10-system ROS was performed and is negative except as noted above and/or in the HPI.          Problem/Recommendations 1: le pain of unclear etiology   doppler pending r/o dvt vs. hypercalcemia r/o radiculoapathy   ca high correct     mr thoracic  lumbar  spine no contrast  if no contraindications         Problem/Recommendations 2: hyperbilirubin   awaiting bili stent   consider ursodiol            pt at risk for developing delirium, therefore please institute the following preventative measures if possible          - initiating early mobilization          -minimizing "tethers" - IV, oxygen, catheters, etc          -avoiding   sedatives          -maintaining hydration/nutrition          -avoid anticholinergics - diphenhydramine, etc          -pain control          -sleep wake cycle regulation; avoid day time somnolence           -supportive environment    ___________________________  Will follow with you.  Thank you,  Kristen Kingston MD  Diplomate of the American Board of Neurology and Psychiatry.  Diplomate of the American Board of Vascular Neurology.   [facility] ([facility]), [facility]   Ph: [phone]    Differential diagnosis and plan of care discussed with patient after the evaluation.   Advanced care planning options discussed.   Pain assessed and judicious use of narcotics when appropriate was discussed.  Importance of Fall prevention discussed.  Counseling on Smoking and Alcohol cessation was offered when appropriate.  Counseling on Diet, exercise, and medication compliance was done.   83 minutes spent on the total encounter;  more than 50 % of the visit was spent on counseling  and or coordinating care by the attending physician.    Thank you for allowing me to participate in the care of this porsche patient. Please do not hesitate to call me if you have any questions.     This and subsequent notes  will  inherently be subject to errors including those of syntax and substitutions which may escape proofreading. In such instances original meaning may be extrapolated by contextual derivation.    [facility] Neurological [facility]), [facility]        Patient is a 78y old  Female who presents with a chief complaint of black stool (22 Sep 2023 09:09)    Excerpt from H&P,"     78y F newly dx'd pancreatic CA (not  yet on tx ) p/w weakness, black stools and dark urine x 3-4 days. Also endorse abd pain x 3 weeks and jaundice. Abm pain now only moderately relieved w/ percocet at home. Follows onco Dr Velázquez O/P, spoke to a staff member  at the office today who  advised her to come to ED for further eval.       ROS: Denies CP, SOB, palpitation, fever, cough, chills, dizziness, recent travel, sick contact    A 10-system ROS was performed and is negative except as noted above and/or in the HPI.   (20 Sep 2023 22:49)           *****PAST MEDICAL / Surgical  HISTORY:  PAST MEDICAL & SURGICAL HISTORY:  Insomnia      Pancreas cancer      Fx Wrist left  orif 2006      S/P Bunionectomy bilateral               *****FAMILY HISTORY:  FAMILY HISTORY:  Family history of myelodysplasia (Mother)    Family history of aortic stenosis (Mother)    Family history of stomach cancer (Sibling)             *****SOCIAL HISTORY:  Alcohol: None  Smoking: None         *****ALLERGIES:   Allergies    No Known Allergies    Intolerances             *****MEDICATIONS: current medication reviewed and documented.   MEDICATIONS  (STANDING):  atenolol  Tablet 50 milliGRAM(s) Oral daily  atorvastatin 40 milliGRAM(s) Oral at bedtime  BUpivacaine 0.25% (Preservative-Free) Injectable 4 milliLiter(s) Local Injection once  dextrose 5%. 1000 milliLiter(s) (50 mL/Hr) IV Continuous <Continuous>  dextrose 5%. 1000 milliLiter(s) (100 mL/Hr) IV Continuous <Continuous>  dextrose 50% Injectable 25 Gram(s) IV Push once  dextrose 50% Injectable 12.5 Gram(s) IV Push once  dextrose 50% Injectable 25 Gram(s) IV Push once  glucagon  Injectable 1 milliGRAM(s) IntraMuscular once  indomethacin Suppository 100 milliGRAM(s) Rectal once  influenza  Vaccine (HIGH DOSE) 0.7 milliLiter(s) IntraMuscular once  insulin lispro (ADMELOG) corrective regimen sliding scale   SubCutaneous three times a day before meals  insulin lispro (ADMELOG) corrective regimen sliding scale   SubCutaneous at bedtime  pantoprazole    Tablet 40 milliGRAM(s) Oral two times a day  polyethylene glycol 3350 17 Gram(s) Oral once  senna 2 Tablet(s) Oral at bedtime  sodium chloride 0.9%. 1000 milliLiter(s) (75 mL/Hr) IV Continuous <Continuous>    MEDICATIONS  (PRN):  acetaminophen     Tablet .. 650 milliGRAM(s) Oral every 6 hours PRN Temp greater or equal to 38C (100.4F), Mild Pain (1 - 3)  ALPRAZolam 0.5 milliGRAM(s) Oral at bedtime PRN Anxiety, insomnia  aluminum hydroxide/magnesium hydroxide/simethicone Suspension 30 milliLiter(s) Oral every 4 hours PRN Dyspepsia  dextrose Oral Gel 15 Gram(s) Oral once PRN Blood Glucose LESS THAN 70 milliGRAM(s)/deciliter  melatonin 3 milliGRAM(s) Oral at bedtime PRN Insomnia  morphine  - Injectable 2 milliGRAM(s) IV Push every 4 hours PRN Intractable pain  ondansetron Injectable 4 milliGRAM(s) IV Push every 8 hours PRN Nausea and/or Vomiting  oxyCODONE    IR 5 milliGRAM(s) Oral every 4 hours PRN Moderate Pain (4 - 6)  oxyCODONE    IR 10 milliGRAM(s) Oral every 4 hours PRN Severe Pain (7 - 10)           *****REVIEW OF SYSTEM:  GEN: no fever, no chills, no pain  RESP: no SOB, no cough, no sputum  CVS: no chest pain, no palpitations, no edema  GI: no abdominal pain, no nausea, no vomiting, no constipation, no diarrhea  : no dysurea, no frequency, no hematurea  Neuro: no headache, no dizziness  PSYCH: no anxiety, no depression  Derm : no itching, no rash         *****VITAL SIGNS:  T(C): 36.7 (23 @ 08:25), Max: 37.1 (23 @ 21:19)  HR: 71 (23 @ 08:25) (71 - 80)  BP: 141/60 (23 @ 08:25) (112/86 - 141/60)  RR: 18 (23 @ 08:25) (18 - 18)  SpO2: 99% (23 @ 08:25) (94% - 100%)  Wt(kg): --           *****PHYSICAL EXAM:   Alert oriented x 2   Attention comprehension are limited    Able to name, repeat,   without any difficulty.   Able to follow 1 step commands.     EOMI fundi not visualized, blinks to threat   No facial asymmetry   Tongue is midline    Moving all 4 ext symmetrically giveway weakness on exam     Reflexes are 1+  throughout absent ankle jerk     sensation is limited    Gait : not assessed.  B/L down going toes               *****LAB AND IMAGIN.6    10.68 )-----------( 280      ( 23 Sep 2023 07:24 )             29.1                   134<L>  |  100  |  21  ----------------------------<  120<H>  4.1   |  24  |  0.74    Ca    10.6<H>      23 Sep 2023 07:13    TPro  6.0  /  Alb  3.0<L>  /  TBili  4.5<H>  /  DBili  x   /  AST  94<H>  /  ALT  213<H>  /  AlkPhos  363<H>                              Urinalysis Basic - ( 23 Sep 2023 07:13 )    Color: x / Appearance: x / SG: x / pH: x  Gluc: 120 mg/dL / Ketone: x  / Bili: x / Urobili: x   Blood: x / Protein: x / Nitrite: x   Leuk Esterase: x / RBC: x / WBC x   Sq Epi: x / Non Sq Epi: x / Bacteria: x        [All pertinent recent Imaging reports reviewed]         *****A S S E S S M E N T   A N D   P L A N :  Excerpt from H&P,"     78y F newly dx'd pancreatic CA (not  yet on tx ) p/w weakness, black stools and dark urine x 3-4 days. Also endorse abd pain x 3 weeks and jaundice. Abm pain now only moderately relieved w/ percocet at home. Follows onco Dr Velázquez O/P, spoke to a staff member  at the office today who  advised her to come to ED for further eval.       ROS: Denies CP, SOB, palpitation, fever, cough, chills, dizziness, recent travel, sick contact    A 10-system ROS was performed and is negative except as noted above and/or in the HPI.          Problem/Recommendations 1: le pain of unclear etiology   there is no weakness, mostly giveway on exam   pt has no numbness on exam   no alarming features of saddle anesthesia or incontinence   likely spinal stenosis   ca high correct     mr thoracic  lumbar  spine no contrast  if no contraindications   can try gabapentin 100 bid for pain   pt eval         Problem/Recommendations 2: hyperbilirubin   awaiting bili stent   consider ursodiol            pt at risk for developing delirium, therefore please institute the following preventative measures if possible          - initiating early mobilization          -minimizing "tethers" - IV, oxygen, catheters, etc          -avoiding   sedatives          -maintaining hydration/nutrition          -avoid anticholinergics - diphenhydramine, etc          -pain control          -sleep wake cycle regulation; avoid day time somnolence           -supportive environment    ___________________________  Will follow with you.  Thank you,  Kristen Kingston MD  Diplomate of the American Board of Neurology and Psychiatry.  Diplomate of the American Board of Vascular Neurology.   [facility] ([facility]), [facility]   Ph: [phone]    Differential diagnosis and plan of care discussed with patient after the evaluation.   Advanced care planning options discussed.   Pain assessed and judicious use of narcotics when appropriate was discussed.  Importance of Fall prevention discussed.  Counseling on Smoking and Alcohol cessation was offered when appropriate.  Counseling on Diet, exercise, and medication compliance was done.   83 minutes spent on the total encounter;  more than 50 % of the visit was spent on counseling  and or coordinating care by the attending physician.    Thank you for allowing me to participate in the care of this porsche patient. Please do not hesitate to call me if you have any questions.     This and subsequent notes  will  inherently be subject to errors including those of syntax and substitutions which may escape proofreading. In such instances original meaning may be extrapolated by contextual derivation.    [facility] Neurological [facility]), [facility]        Patient is a 78y old  Female who presents with a chief complaint of black stool (22 Sep 2023 09:09)    Excerpt from H&P,"     78y F newly dx'd pancreatic CA (not  yet on tx ) p/w weakness, black stools and dark urine x 3-4 days. Also endorse abd pain x 3 weeks and jaundice. Abm pain now only moderately relieved w/ percocet at home. Follows onco Dr Velázquez O/P, spoke to a staff member  at the office today who  advised her to come to ED for further eval.       ROS: Denies CP, SOB, palpitation, fever, cough, chills, dizziness, recent travel, sick contact    A 10-system ROS was performed and is negative except as noted above and/or in the HPI.   (20 Sep 2023 22:49)           *****PAST MEDICAL / Surgical  HISTORY:  PAST MEDICAL & SURGICAL HISTORY:  Insomnia      Pancreas cancer      Fx Wrist left  orif 2006      S/P Bunionectomy bilateral               *****FAMILY HISTORY:  FAMILY HISTORY:  Family history of myelodysplasia (Mother)    Family history of aortic stenosis (Mother)    Family history of stomach cancer (Sibling)             *****SOCIAL HISTORY:  Alcohol: None  Smoking: None         *****ALLERGIES:   Allergies    No Known Allergies    Intolerances             *****MEDICATIONS: current medication reviewed and documented.   MEDICATIONS  (STANDING):  atenolol  Tablet 50 milliGRAM(s) Oral daily  atorvastatin 40 milliGRAM(s) Oral at bedtime  BUpivacaine 0.25% (Preservative-Free) Injectable 4 milliLiter(s) Local Injection once  dextrose 5%. 1000 milliLiter(s) (50 mL/Hr) IV Continuous <Continuous>  dextrose 5%. 1000 milliLiter(s) (100 mL/Hr) IV Continuous <Continuous>  dextrose 50% Injectable 25 Gram(s) IV Push once  dextrose 50% Injectable 12.5 Gram(s) IV Push once  dextrose 50% Injectable 25 Gram(s) IV Push once  glucagon  Injectable 1 milliGRAM(s) IntraMuscular once  indomethacin Suppository 100 milliGRAM(s) Rectal once  influenza  Vaccine (HIGH DOSE) 0.7 milliLiter(s) IntraMuscular once  insulin lispro (ADMELOG) corrective regimen sliding scale   SubCutaneous three times a day before meals  insulin lispro (ADMELOG) corrective regimen sliding scale   SubCutaneous at bedtime  pantoprazole    Tablet 40 milliGRAM(s) Oral two times a day  polyethylene glycol 3350 17 Gram(s) Oral once  senna 2 Tablet(s) Oral at bedtime  sodium chloride 0.9%. 1000 milliLiter(s) (75 mL/Hr) IV Continuous <Continuous>    MEDICATIONS  (PRN):  acetaminophen     Tablet .. 650 milliGRAM(s) Oral every 6 hours PRN Temp greater or equal to 38C (100.4F), Mild Pain (1 - 3)  ALPRAZolam 0.5 milliGRAM(s) Oral at bedtime PRN Anxiety, insomnia  aluminum hydroxide/magnesium hydroxide/simethicone Suspension 30 milliLiter(s) Oral every 4 hours PRN Dyspepsia  dextrose Oral Gel 15 Gram(s) Oral once PRN Blood Glucose LESS THAN 70 milliGRAM(s)/deciliter  melatonin 3 milliGRAM(s) Oral at bedtime PRN Insomnia  morphine  - Injectable 2 milliGRAM(s) IV Push every 4 hours PRN Intractable pain  ondansetron Injectable 4 milliGRAM(s) IV Push every 8 hours PRN Nausea and/or Vomiting  oxyCODONE    IR 5 milliGRAM(s) Oral every 4 hours PRN Moderate Pain (4 - 6)  oxyCODONE    IR 10 milliGRAM(s) Oral every 4 hours PRN Severe Pain (7 - 10)           *****REVIEW OF SYSTEM:  GEN: no fever, no chills, no pain  RESP: no SOB, no cough, no sputum  CVS: no chest pain, no palpitations, no edema  GI: no abdominal pain, no nausea, no vomiting, no constipation, no diarrhea  : no dysurea, no frequency, no hematurea  Neuro: no headache, no dizziness  PSYCH: no anxiety, no depression  Derm : no itching, no rash         *****VITAL SIGNS:  T(C): 36.7 (23 @ 08:25), Max: 37.1 (23 @ 21:19)  HR: 71 (23 @ 08:25) (71 - 80)  BP: 141/60 (23 @ 08:25) (112/86 - 141/60)  RR: 18 (23 @ 08:25) (18 - 18)  SpO2: 99% (23 @ 08:25) (94% - 100%)  Wt(kg): --           *****PHYSICAL EXAM:   Alert oriented x 2   Attention comprehension are limited    Able to name, repeat,   without any difficulty.   Able to follow 1 step commands.     EOMI fundi not visualized, blinks to threat   No facial asymmetry   Tongue is midline    Moving all 4 ext symmetrically giveway weakness on exam     Reflexes are 1+  throughout absent ankle jerk     sensation is limited    Gait : not assessed.  B/L down going toes               *****LAB AND IMAGIN.6    10.68 )-----------( 280      ( 23 Sep 2023 07:24 )             29.1                   134<L>  |  100  |  21  ----------------------------<  120<H>  4.1   |  24  |  0.74    Ca    10.6<H>      23 Sep 2023 07:13    TPro  6.0  /  Alb  3.0<L>  /  TBili  4.5<H>  /  DBili  x   /  AST  94<H>  /  ALT  213<H>  /  AlkPhos  363<H>                              Urinalysis Basic - ( 23 Sep 2023 07:13 )    Color: x / Appearance: x / SG: x / pH: x  Gluc: 120 mg/dL / Ketone: x  / Bili: x / Urobili: x   Blood: x / Protein: x / Nitrite: x   Leuk Esterase: x / RBC: x / WBC x   Sq Epi: x / Non Sq Epi: x / Bacteria: x        [All pertinent recent Imaging reports reviewed]         *****A S S E S S M E N T   A N D   P L A N :  Excerpt from H&P,"     78y F newly dx'd pancreatic CA (not  yet on tx ) p/w weakness, black stools and dark urine x 3-4 days. Also endorse abd pain x 3 weeks and jaundice. Abm pain now only moderately relieved w/ percocet at home. Follows onco Dr Velázquez O/P, spoke to a staff member  at the office today who  advised her to come to ED for further eval.       ROS: Denies CP, SOB, palpitation, fever, cough, chills, dizziness, recent travel, sick contact    A 10-system ROS was performed and is negative except as noted above and/or in the HPI.          Problem/Recommendations 1: le pain of unclear etiology   there is no weakness, mostly giveway on exam   pt has no numbness on exam   no alarming features of saddle anesthesia or incontinence   likely spinal stenosis   ca high correct     mr thoracic  lumbar  spine no contrast  if no contraindications   can try cymbalta 20 mg daily for pain /adjustment d/o  pt eval         Problem/Recommendations 2: hyperbilirubin   awaiting bili stent   consider ursodiol         pt requesting psych consult for anxiety      pt at risk for developing delirium, therefore please institute the following preventative measures if possible          - initiating early mobilization          -minimizing "tethers" - IV, oxygen, catheters, etc          -avoiding   sedatives          -maintaining hydration/nutrition          -avoid anticholinergics - diphenhydramine, etc          -pain control          -sleep wake cycle regulation; avoid day time somnolence           -supportive environment    ___________________________  Will follow with you.  Thank you,  Kristen Kingston MD  Diplomate of the American Board of Neurology and Psychiatry.  Diplomate of the American Board of Vascular Neurology.   [facility] Neurological [facility] ([facility]), [facility]   Ph: [phone]    Differential diagnosis and plan of care discussed with patient after the evaluation.   Advanced care planning options discussed.   Pain assessed and judicious use of narcotics when appropriate was discussed.  Importance of Fall prevention discussed.  Counseling on Smoking and Alcohol cessation was offered when appropriate.  Counseling on Diet, exercise, and medication compliance was done.   83 minutes spent on the total encounter;  more than 50 % of the visit was spent on counseling  and or coordinating care by the attending physician.    Thank you for allowing me to participate in the care of this porsche patient. Please do not hesitate to call me if you have any questions.     This and subsequent notes  will  inherently be subject to errors including those of syntax and substitutions which may escape proofreading. In such instances original meaning may be extrapolated by contextual derivation.

## 2023-09-23 NOTE — PROGRESS NOTE ADULT - SUBJECTIVE AND OBJECTIVE BOX
Patient is a 78y old  Female who presents with a chief complaint of black stool (23 Sep 2023 11:19)      SUBJECTIVE / OVERNIGHT EVENTS:  Patient seen and examined.   Subjectively with LE weakness.       Vital Signs Last 24 Hrs  T(C): 36.7 (23 Sep 2023 08:25), Max: 37.1 (22 Sep 2023 21:19)  T(F): 98.1 (23 Sep 2023 08:25), Max: 98.7 (22 Sep 2023 21:19)  HR: 71 (23 Sep 2023 08:25) (71 - 80)  BP: 141/60 (23 Sep 2023 08:25) (112/86 - 141/60)  BP(mean): --  RR: 18 (23 Sep 2023 08:25) (18 - 18)  SpO2: 99% (23 Sep 2023 08:25) (94% - 100%)    Parameters below as of 23 Sep 2023 08:25  Patient On (Oxygen Delivery Method): room air      I&O's Summary      PHYSICAL EXAM:  GENERAL: NAD, well-developed, comfortable  HEAD:  Atraumatic, Normocephalic  EYES: EOMI, PERRLA, +sclera icterus  NECK: Supple, No JVD  CHEST/LUNG: Clear to auscultation bilaterally; No wheeze  HEART: Regular rate and rhythm; No murmurs, rubs, or gallops  ABDOMEN: Soft, Nontender, Nondistended; Bowel sounds present  NEURO: AAOx3, no focal weakness, 5/5 b/l extremity strength, b/l knee no arthritis, no effusion   EXTREMITIES:  2+ Peripheral Pulses, No clubbing, cyanosis, or edema  SKIN: No rashes or lesions  LABS:                        9.6    10.68 )-----------( 280      ( 23 Sep 2023 07:24 )             29.1     09-23    134<L>  |  100  |  21  ----------------------------<  120<H>  4.1   |  24  |  0.74    Ca    10.6<H>      23 Sep 2023 07:13    TPro  6.0  /  Alb  3.0<L>  /  TBili  4.5<H>  /  DBili  x   /  AST  94<H>  /  ALT  213<H>  /  AlkPhos  363<H>  09-23      CAPILLARY BLOOD GLUCOSE      POCT Blood Glucose.: 135 mg/dL (23 Sep 2023 12:23)  POCT Blood Glucose.: 133 mg/dL (23 Sep 2023 07:59)  POCT Blood Glucose.: 206 mg/dL (22 Sep 2023 21:27)  POCT Blood Glucose.: 166 mg/dL (22 Sep 2023 16:57)        Urinalysis Basic - ( 23 Sep 2023 07:13 )    Color: x / Appearance: x / SG: x / pH: x  Gluc: 120 mg/dL / Ketone: x  / Bili: x / Urobili: x   Blood: x / Protein: x / Nitrite: x   Leuk Esterase: x / RBC: x / WBC x   Sq Epi: x / Non Sq Epi: x / Bacteria: x        RADIOLOGY & ADDITIONAL TESTS:    Imaging Personally Reviewed:  [x] YES  [ ] NO    Consultant(s) Notes Reviewed:  [x] YES  [ ] NO      MEDICATIONS  (STANDING):  ALPRAZolam 0.5 milliGRAM(s) Oral once  atenolol  Tablet 50 milliGRAM(s) Oral daily  atorvastatin 40 milliGRAM(s) Oral at bedtime  BUpivacaine 0.25% (Preservative-Free) Injectable 4 milliLiter(s) Local Injection once  dextrose 5%. 1000 milliLiter(s) (50 mL/Hr) IV Continuous <Continuous>  dextrose 5%. 1000 milliLiter(s) (100 mL/Hr) IV Continuous <Continuous>  dextrose 50% Injectable 25 Gram(s) IV Push once  dextrose 50% Injectable 12.5 Gram(s) IV Push once  dextrose 50% Injectable 25 Gram(s) IV Push once  DULoxetine 20 milliGRAM(s) Oral daily  glucagon  Injectable 1 milliGRAM(s) IntraMuscular once  indomethacin Suppository 100 milliGRAM(s) Rectal once  influenza  Vaccine (HIGH DOSE) 0.7 milliLiter(s) IntraMuscular once  insulin lispro (ADMELOG) corrective regimen sliding scale   SubCutaneous three times a day before meals  insulin lispro (ADMELOG) corrective regimen sliding scale   SubCutaneous at bedtime  pantoprazole    Tablet 40 milliGRAM(s) Oral two times a day  polyethylene glycol 3350 17 Gram(s) Oral once  senna 2 Tablet(s) Oral at bedtime  sodium chloride 0.9%. 1000 milliLiter(s) (75 mL/Hr) IV Continuous <Continuous>    MEDICATIONS  (PRN):  acetaminophen     Tablet .. 650 milliGRAM(s) Oral every 6 hours PRN Temp greater or equal to 38C (100.4F), Mild Pain (1 - 3)  ALPRAZolam 0.5 milliGRAM(s) Oral at bedtime PRN Anxiety, insomnia  aluminum hydroxide/magnesium hydroxide/simethicone Suspension 30 milliLiter(s) Oral every 4 hours PRN Dyspepsia  dextrose Oral Gel 15 Gram(s) Oral once PRN Blood Glucose LESS THAN 70 milliGRAM(s)/deciliter  melatonin 3 milliGRAM(s) Oral at bedtime PRN Insomnia  morphine  - Injectable 2 milliGRAM(s) IV Push every 4 hours PRN Intractable pain  ondansetron Injectable 4 milliGRAM(s) IV Push every 8 hours PRN Nausea and/or Vomiting  oxyCODONE    IR 10 milliGRAM(s) Oral every 4 hours PRN Severe Pain (7 - 10)  oxyCODONE    IR 5 milliGRAM(s) Oral every 4 hours PRN Moderate Pain (4 - 6)      Care Discussed with Consultants/Other Providers [x] YES  [ ] NO    HEALTH ISSUES - PROBLEM Dx:  Pancreas cancer    Hypercalcemia    Hyperglycemia    Cancer related pain    Prophylactic measure    Abnormal LFTs

## 2023-09-23 NOTE — PHYSICAL THERAPY INITIAL EVALUATION ADULT - ADDITIONAL COMMENTS
As per the pt, she lives [place] her  in [place], 2-3STE, -steps thru garage, PTA, stated she was Independent with mobility/ADls. No DMEs used or owns at home.

## 2023-09-23 NOTE — PHYSICAL THERAPY INITIAL EVALUATION ADULT - PERTINENT HX OF CURRENT PROBLEM, REHAB EVAL
78y F newly dx'd pancreatic CA (not  yet on tx ) p/w weakness, black stools and dark urine x 3-4 days. Also endorse abd pain x 3 weeks and jaundice. Abm pain now only moderately relieved w/ percocet at home. Follows onco Dr Velázquez O/P, spoke to a staff member  at the office today who  advised her to come to ED for further eval. CT Abd: Pancreatic head mass consistent with known pancreatic cancer with associated peripancreatic adenopathy and hepatic metastases. The hepatic lesions and increase in size and number as compared with the prior MRI of 08/25/2023. CT Lumbar: Multilevel degenerative changes of the lumbar spine characterized by posterior disc osteophyte formation which contribute to multilevel neural foraminal narrowing, severe on the right side at L4/L5 and L5/S1. No high-grade spinal canal stenosis.

## 2023-09-23 NOTE — PROGRESS NOTE ADULT - ASSESSMENT
78y F newly dx'd pancreatic CA (not  yet on tx ) p/w weakness, black stools and dark urine x 3-4 days, jaundice, and  worsening abdominal pain admitted for pain control and obstructive jaundice.     Plan:    # Pancreas cancer w/ hepatic mets/ Biliary obstruction:  - + Transaminitis  - CT showing peripancreatic adenopathy and hepatic metastases w/ hepatic   lesions increase in size and number as compared with the prior MRI of  08/25/2023  - S/p MRCP 8/25/23 showed hepatic lesions as large as 2-3 cm as well as pancreatic lesion 4-5 cm. EGD/EUS from 9/5/23 showed duodenal invasion with ulceration- bx consistent w/ pancreatic adenocarcinoma, CT C/A/P on this admission showing increased liver mets, intra,extrahepatic mild/moderate biliary dilatation 2/2 to pancreatic head mass w/ bilirubin elevations  - S/p EGD/ERCP 9/21, unable to stent, concern for bleeding, hemostatic gel placed in area of possible bleeding, celiac nerve plexus block performed 9/21  - C/w PPI  - Trend CBC's and LFT's  - Pain control  - Diet per GI rec's-> clears  - Rec'ed for IR consult for biliary decompression  - GI and Heme/ Onc following    # LE weaknes?  - no effort on exam  - ct L spine with djd and foraminal narrowing ; no spinal stenosis   - f/u MRI spine    - neurology consulted     # Hypercalcemia:  - Mild hyperCa i/s/o malignancy  - S/p IVF  - Avoid dehydration    # HTN/ HLD:  - Trend BP's  - C/w current meds    # Dm2:  - HgbA1c 7.7  - Continue to monitor blood glucose levels  - Sliding Scale    # Anxiety  - C/w current meds    # DVT ppx:  - IPC's  - Lovenox sq    [facility]  [phone]

## 2023-09-24 NOTE — CONSULT NOTE ADULT - PROBLEM SELECTOR RECOMMENDATION 9
Will continue current insulin regimen for now. Will continue monitoring  blood sugars, will Follow up.  In view of her malignancy and life expectancy suggest not to start her on any diabetic medications, counseled for low carb diet.

## 2023-09-24 NOTE — PROGRESS NOTE ADULT - SUBJECTIVE AND OBJECTIVE BOX
[facility]      Seen earlier today [Please note that date of entry above  is the actual  DATE OF SERVICE] No new neurological symptoms reported. Remains stable neurologically.   - Today, patient is without complaints.          *****MEDICATIONS: Current medication reviewed and documented.    MEDICATIONS  (STANDING):  atenolol  Tablet 50 milliGRAM(s) Oral daily  atorvastatin 40 milliGRAM(s) Oral at bedtime  BUpivacaine 0.25% (Preservative-Free) Injectable 4 milliLiter(s) Local Injection once  dextrose 5%. 1000 milliLiter(s) (50 mL/Hr) IV Continuous <Continuous>  dextrose 5%. 1000 milliLiter(s) (100 mL/Hr) IV Continuous <Continuous>  dextrose 50% Injectable 25 Gram(s) IV Push once  dextrose 50% Injectable 25 Gram(s) IV Push once  dextrose 50% Injectable 12.5 Gram(s) IV Push once  DULoxetine 20 milliGRAM(s) Oral daily  gabapentin 100 milliGRAM(s) Oral three times a day  glucagon  Injectable 1 milliGRAM(s) IntraMuscular once  indomethacin Suppository 100 milliGRAM(s) Rectal once  influenza  Vaccine (HIGH DOSE) 0.7 milliLiter(s) IntraMuscular once  insulin lispro (ADMELOG) corrective regimen sliding scale   SubCutaneous every 6 hours  pantoprazole    Tablet 40 milliGRAM(s) Oral two times a day  polyethylene glycol 3350 17 Gram(s) Oral once  polyethylene glycol 3350 17 Gram(s) Oral daily  senna 2 Tablet(s) Oral at bedtime  sodium chloride 0.9%. 1000 milliLiter(s) (75 mL/Hr) IV Continuous <Continuous>    MEDICATIONS  (PRN):  acetaminophen     Tablet .. 650 milliGRAM(s) Oral every 6 hours PRN Temp greater or equal to 38C (100.4F), Mild Pain (1 - 3)  ALPRAZolam 0.5 milliGRAM(s) Oral at bedtime PRN Anxiety, insomnia  aluminum hydroxide/magnesium hydroxide/simethicone Suspension 30 milliLiter(s) Oral every 4 hours PRN Dyspepsia  dextrose Oral Gel 15 Gram(s) Oral once PRN Blood Glucose LESS THAN 70 milliGRAM(s)/deciliter  melatonin 3 milliGRAM(s) Oral at bedtime PRN Insomnia  morphine  - Injectable 2 milliGRAM(s) IV Push every 4 hours PRN Intractable pain  ondansetron Injectable 4 milliGRAM(s) IV Push every 8 hours PRN Nausea and/or Vomiting  oxyCODONE    IR 5 milliGRAM(s) Oral every 4 hours PRN Moderate Pain (4 - 6)  oxyCODONE    IR 10 milliGRAM(s) Oral every 4 hours PRN Severe Pain (7 - 10)          ***** VITAL SIGNS:   Vital Signs Last 24 Hrs      I&O's Summary    23 Sep 2023 07:01  -  24 Sep 2023 07:00  --------------------------------------------------------  IN: 900 mL / OUT: 0 mL / NET: 900 mL    24 Sep 2023 07:01  -  24 Sep 2023 23:09  --------------------------------------------------------  IN: 750 mL / OUT: 0 mL / NET: 750 mL             *****PHYSICAL EXAM:   alert oriented x 3 attention comprehension are fair.  Able to name, repeat.   EOmi fundi not visualized   no nystagmus VFF to confrontation  Tongue is midline  Palate elevates symmetrically   Moving all 4 ext spontaneously no drift appreciated    Gait not assessed.            *****LAB AND IMAGIN.7    11.62 )-----------( 277      ( 24 Sep 2023 07:14 )             29.9                   132<L>  |  100  |  19  ----------------------------<  142<H>  3.7   |  24  |  0.63    Ca    10.6<H>      24 Sep 2023 07:07    TPro  6.2  /  Alb  3.1<L>  /  TBili  5.5<H>  /  DBili  x   /  AST  110<H>  /  ALT  216<H>  /  AlkPhos  392<H>  09                       Urinalysis Basic - ( 24 Sep 2023 07:07 )    Color: x / Appearance: x / SG: x / pH: x  Gluc: 142 mg/dL / Ketone: x  / Bili: x / Urobili: x   Blood: x / Protein: x / Nitrite: x   Leuk Esterase: x / RBC: x / WBC x   Sq Epi: x / Non Sq Epi: x / Bacteria: x      [All pertinent recent Imaging/Reports reviewed]            *****A S S E S S M E N T   A N D   P L A N :    Excerpt from H&P,"     78y F newly dx'd pancreatic CA (not  yet on tx ) p/w weakness, black stools and dark urine x 3-4 days. Also endorse abd pain x 3 weeks and jaundice. Abm pain now only moderately relieved w/ percocet at home. Follows onco Dr Velázquez O/P, spoke to a staff member  at the office today who  advised her to come to ED for further eval.       ROS: Denies CP, SOB, palpitation, fever, cough, chills, dizziness, recent travel, sick contact    A 10-system ROS was performed and is negative except as noted above and/or in the HPI.          Problem/Recommendations 1: le pain of unclear etiology   there is no weakness, mostly giveway on exam   pt has no numbness on exam   no alarming features of saddle anesthesia or incontinence   likely spinal stenosis   ca high correct     mr thoracic  lumbar  spine no contrast  if no contraindications   can try cymbalta 20 mg daily for pain /adjustment d/o  pt eval         Problem/Recommendations 2: hyperbilirubin   awaiting bili stent   consider ursodiol         pt requesting psych consult for anxiety      Thank you for allowing me to participate in the care of this patient. Will continue to follow patient periodically. Please do not hesitate to call me if you have any  questions or if there has been a change in patients neurological status     ________________  Kristen Kingston MD  [facility] ([facility]  [phone]      33 minutes spent on total encounter; more than 50 % of the visit was  spent counseling about plan of care, compliance to diet/exercise and medication regimen and or  coordinating care by the attending physician.      It is advised that stroke patients follow up with SELAM Ross @ [phone] in 1- 2 weeks.   Others please follow up with Dr. Michael Nissenbaum [phone]

## 2023-09-24 NOTE — CONSULT NOTE ADULT - SUBJECTIVE AND OBJECTIVE BOX
HPI:  78y F newly dx'd pancreatic CA (not  yet on tx ) p/w weakness, black stools and dark urine x 3-4 days. Also endorse abd pain x 3 weeks and jaundice. Abm pain now only moderately relieved w/ percocet at home. Follows onco Dr Velázquez O/P, spoke to a staff member  at the office today who  advised her to come to ED for further eval.       ROS: Denies CP, SOB, palpitation, fever, cough, chills, dizziness, recent travel, sick contact    A 10-system ROS was performed and is negative except as noted above and/or in the HPI.   (20 Sep 2023 22:49)  Patient has no history of diabetes, was not on any hypoglycemic agents at home.  PAST MEDICAL & SURGICAL HISTORY:  Insomnia      Pancreas cancer      Fx Wrist left  orif 2006      S/P Bunionectomy bilateral          FAMILY HISTORY:  Family history of myelodysplasia (Mother)    Family history of aortic stenosis (Mother)    Family history of stomach cancer (Sibling)        Social History:    Outpatient Medications:    MEDICATIONS  (STANDING):  ALPRAZolam 0.5 milliGRAM(s) Oral once  atenolol  Tablet 50 milliGRAM(s) Oral daily  atorvastatin 40 milliGRAM(s) Oral at bedtime  BUpivacaine 0.25% (Preservative-Free) Injectable 4 milliLiter(s) Local Injection once  dextrose 5%. 1000 milliLiter(s) (50 mL/Hr) IV Continuous <Continuous>  dextrose 5%. 1000 milliLiter(s) (100 mL/Hr) IV Continuous <Continuous>  dextrose 50% Injectable 25 Gram(s) IV Push once  dextrose 50% Injectable 25 Gram(s) IV Push once  dextrose 50% Injectable 12.5 Gram(s) IV Push once  DULoxetine 20 milliGRAM(s) Oral daily  glucagon  Injectable 1 milliGRAM(s) IntraMuscular once  indomethacin Suppository 100 milliGRAM(s) Rectal once  influenza  Vaccine (HIGH DOSE) 0.7 milliLiter(s) IntraMuscular once  insulin lispro (ADMELOG) corrective regimen sliding scale   SubCutaneous three times a day before meals  insulin lispro (ADMELOG) corrective regimen sliding scale   SubCutaneous at bedtime  pantoprazole    Tablet 40 milliGRAM(s) Oral two times a day  polyethylene glycol 3350 17 Gram(s) Oral once  senna 2 Tablet(s) Oral at bedtime  sodium chloride 0.9%. 1000 milliLiter(s) (75 mL/Hr) IV Continuous <Continuous>    MEDICATIONS  (PRN):  acetaminophen     Tablet .. 650 milliGRAM(s) Oral every 6 hours PRN Temp greater or equal to 38C (100.4F), Mild Pain (1 - 3)  ALPRAZolam 0.5 milliGRAM(s) Oral at bedtime PRN Anxiety, insomnia  aluminum hydroxide/magnesium hydroxide/simethicone Suspension 30 milliLiter(s) Oral every 4 hours PRN Dyspepsia  dextrose Oral Gel 15 Gram(s) Oral once PRN Blood Glucose LESS THAN 70 milliGRAM(s)/deciliter  melatonin 3 milliGRAM(s) Oral at bedtime PRN Insomnia  morphine  - Injectable 2 milliGRAM(s) IV Push every 4 hours PRN Intractable pain  ondansetron Injectable 4 milliGRAM(s) IV Push every 8 hours PRN Nausea and/or Vomiting  oxyCODONE    IR 10 milliGRAM(s) Oral every 4 hours PRN Severe Pain (7 - 10)  oxyCODONE    IR 5 milliGRAM(s) Oral every 4 hours PRN Moderate Pain (4 - 6)      Allergies    No Known Allergies    Intolerances      Review of Systems:  UNABLE TO OBTAIN  Cardiovascular: No chest pain, no palpitations  Respiratory: No wheezing, no cough  GI: No nausea, no vomiting  : No dysuria        ALL OTHER SYSTEMS REVIEWED AND NEGATIVE    PHYSICAL EXAM:  VITALS: T(C): 37.5 (09-24-23 @ 00:39)  T(F): 99.5 (09-24-23 @ 00:39), Max: 99.5 (09-24-23 @ 00:39)  HR: 69 (09-24-23 @ 00:39) (69 - 74)  BP: 130/77 (09-24-23 @ 00:39) (115/56 - 141/60)  RR:  (18 - 18)  SpO2:  (93% - 99%)  Wt(kg): --  THYROID: Normal size, no palpable nodules  RESPIRATORY: Clear to auscultation bilaterally.  CARDIOVASCULAR: Si S2, No murmurs;  GI: Soft, non distended.      POCT Blood Glucose.: 143 mg/dL (09-23-23 @ 20:50)  POCT Blood Glucose.: 171 mg/dL (09-23-23 @ 16:45)  POCT Blood Glucose.: 135 mg/dL (09-23-23 @ 12:23)  POCT Blood Glucose.: 133 mg/dL (09-23-23 @ 07:59)  POCT Blood Glucose.: 206 mg/dL (09-22-23 @ 21:27)  POCT Blood Glucose.: 166 mg/dL (09-22-23 @ 16:57)  POCT Blood Glucose.: 143 mg/dL (09-22-23 @ 11:21)  POCT Blood Glucose.: 125 mg/dL (09-22-23 @ 07:59)  POCT Blood Glucose.: 217 mg/dL (09-21-23 @ 21:24)  POCT Blood Glucose.: 169 mg/dL (09-21-23 @ 18:56)  POCT Blood Glucose.: 152 mg/dL (09-21-23 @ 08:16)                            9.6    10.68 )-----------( 280      ( 23 Sep 2023 07:24 )             29.1       09-23    134<L>  |  100  |  21  ----------------------------<  120<H>  4.1   |  24  |  0.74    eGFR: 84    Ca    10.6<H>      09-23    TPro  6.0  /  Alb  3.0<L>  /  TBili  4.5<H>  /  DBili  x   /  AST  94<H>  /  ALT  213<H>  /  AlkPhos  363<H>  09-23      Thyroid Function Tests:          Radiology:

## 2023-09-24 NOTE — PROGRESS NOTE ADULT - ASSESSMENT
78y F newly dx'd pancreatic CA (not  yet on tx ) p/w weakness, black stools and dark urine x 3-4 days, jaundice, and  worsening abdominal pain admitted for pain control and obstructive jaundice.     Plan:    # Pancreas cancer w/ hepatic mets/ Biliary obstruction:  - + Transaminitis  - CT showing peripancreatic adenopathy and hepatic metastases w/ hepatic   lesions increase in size and number as compared with the prior MRI of  08/25/2023  - S/p MRCP 8/25/23 showed hepatic lesions as large as 2-3 cm as well as pancreatic lesion 4-5 cm. EGD/EUS from 9/5/23 showed duodenal invasion with ulceration- bx consistent w/ pancreatic adenocarcinoma, CT C/A/P on this admission showing increased liver mets, intra,extrahepatic mild/moderate biliary dilatation 2/2 to pancreatic head mass w/ bilirubin elevations  - S/p EGD/ERCP 9/21, unable to stent, concern for bleeding, hemostatic gel placed in area of possible bleeding, celiac nerve plexus block performed 9/21  - C/w PPI  - Trend CBC's and LFT's  - Pain control  - Diet per GI rec's-> clears  - Rec'ed for IR consult for biliary decompression; npo after midnight   - GI and Heme/ Onc following    # LE weakness and pain  - no effort on exam  - ct L spine with djd and foraminal narrowing ; no spinal stenosis   - f/u MRI spine    - neurology consulted   -gabapentin 100 mg tid     # Hypercalcemia:  - Mild hyperCa i/s/o malignancy  - S/p IVF  - Avoid dehydration    # HTN/ HLD:  - Trend BP's  - C/w current meds    # Dm2:  - HgbA1c 7.7  - Continue to monitor blood glucose levels  - Sliding Scale    # Anxiety  - C/w current meds    # DVT ppx:  - IPC's  - Lovenox sq    [facility]  [phone]

## 2023-09-24 NOTE — PROGRESS NOTE ADULT - SUBJECTIVE AND OBJECTIVE BOX
Patient is a 78y old  Female who presents with a chief complaint of black stool (24 Sep 2023 07:02)      SUBJECTIVE / OVERNIGHT EVENTS:  Patient seen and examined.   Some abdominal pain.      Vital Signs Last 24 Hrs  T(C): 37.5 (24 Sep 2023 00:39), Max: 37.5 (24 Sep 2023 00:39)  T(F): 99.5 (24 Sep 2023 00:39), Max: 99.5 (24 Sep 2023 00:39)  HR: 69 (24 Sep 2023 00:39) (69 - 74)  BP: 130/77 (24 Sep 2023 00:39) (115/56 - 130/77)  BP(mean): --  RR: 18 (24 Sep 2023 00:39) (18 - 18)  SpO2: 93% (24 Sep 2023 00:39) (93% - 98%)    Parameters below as of 24 Sep 2023 00:39  Patient On (Oxygen Delivery Method): room air      I&O's Summary    23 Sep 2023 07:01  -  24 Sep 2023 07:00  --------------------------------------------------------  IN: 900 mL / OUT: 0 mL / NET: 900 mL        PHYSICAL EXAM:  GENERAL: NAD, AAOx3  HEAD:  Atraumatic, Normocephalic  EYES: EOMI; conjunctiva and sclera clear  NECK: Supple, No JVD, No LAD  CHEST/LUNG: B/L air entry; No wheezes, rales or rhonci   HEART: Regular rate and rhythm; No murmurs, rubs, or gallops  ABDOMEN: Soft, Nontender, Nondistended; Bowel sounds present  EXTREMITIES:  2+ Peripheral Pulses, No clubbing, cyanosis, or edema  SKIN: No rashes or lesions    LABS:                        9.7    11.62 )-----------( 277      ( 24 Sep 2023 07:14 )             29.9     09-24    132<L>  |  100  |  19  ----------------------------<  142<H>  3.7   |  24  |  0.63    Ca    10.6<H>      24 Sep 2023 07:07    TPro  6.2  /  Alb  3.1<L>  /  TBili  5.5<H>  /  DBili  x   /  AST  110<H>  /  ALT  216<H>  /  AlkPhos  392<H>  09-24      CAPILLARY BLOOD GLUCOSE      POCT Blood Glucose.: 172 mg/dL (24 Sep 2023 12:09)  POCT Blood Glucose.: 129 mg/dL (24 Sep 2023 08:15)  POCT Blood Glucose.: 143 mg/dL (23 Sep 2023 20:50)  POCT Blood Glucose.: 171 mg/dL (23 Sep 2023 16:45)        Urinalysis Basic - ( 24 Sep 2023 07:07 )    Color: x / Appearance: x / SG: x / pH: x  Gluc: 142 mg/dL / Ketone: x  / Bili: x / Urobili: x   Blood: x / Protein: x / Nitrite: x   Leuk Esterase: x / RBC: x / WBC x   Sq Epi: x / Non Sq Epi: x / Bacteria: x        RADIOLOGY & ADDITIONAL TESTS:    Imaging Personally Reviewed:  [x] YES  [ ] NO    Consultant(s) Notes Reviewed:  [x] YES  [ ] NO      MEDICATIONS  (STANDING):  ALPRAZolam 0.5 milliGRAM(s) Oral once  atenolol  Tablet 50 milliGRAM(s) Oral daily  atorvastatin 40 milliGRAM(s) Oral at bedtime  BUpivacaine 0.25% (Preservative-Free) Injectable 4 milliLiter(s) Local Injection once  dextrose 5%. 1000 milliLiter(s) (50 mL/Hr) IV Continuous <Continuous>  dextrose 5%. 1000 milliLiter(s) (100 mL/Hr) IV Continuous <Continuous>  dextrose 50% Injectable 25 Gram(s) IV Push once  dextrose 50% Injectable 25 Gram(s) IV Push once  dextrose 50% Injectable 12.5 Gram(s) IV Push once  DULoxetine 20 milliGRAM(s) Oral daily  gabapentin 100 milliGRAM(s) Oral three times a day  glucagon  Injectable 1 milliGRAM(s) IntraMuscular once  indomethacin Suppository 100 milliGRAM(s) Rectal once  influenza  Vaccine (HIGH DOSE) 0.7 milliLiter(s) IntraMuscular once  insulin lispro (ADMELOG) corrective regimen sliding scale   SubCutaneous three times a day before meals  insulin lispro (ADMELOG) corrective regimen sliding scale   SubCutaneous at bedtime  pantoprazole    Tablet 40 milliGRAM(s) Oral two times a day  polyethylene glycol 3350 17 Gram(s) Oral daily  polyethylene glycol 3350 17 Gram(s) Oral once  senna 2 Tablet(s) Oral at bedtime  sodium chloride 0.9%. 1000 milliLiter(s) (75 mL/Hr) IV Continuous <Continuous>    MEDICATIONS  (PRN):  acetaminophen     Tablet .. 650 milliGRAM(s) Oral every 6 hours PRN Temp greater or equal to 38C (100.4F), Mild Pain (1 - 3)  ALPRAZolam 0.5 milliGRAM(s) Oral at bedtime PRN Anxiety, insomnia  aluminum hydroxide/magnesium hydroxide/simethicone Suspension 30 milliLiter(s) Oral every 4 hours PRN Dyspepsia  dextrose Oral Gel 15 Gram(s) Oral once PRN Blood Glucose LESS THAN 70 milliGRAM(s)/deciliter  melatonin 3 milliGRAM(s) Oral at bedtime PRN Insomnia  morphine  - Injectable 2 milliGRAM(s) IV Push every 4 hours PRN Intractable pain  ondansetron Injectable 4 milliGRAM(s) IV Push every 8 hours PRN Nausea and/or Vomiting  oxyCODONE    IR 5 milliGRAM(s) Oral every 4 hours PRN Moderate Pain (4 - 6)  oxyCODONE    IR 10 milliGRAM(s) Oral every 4 hours PRN Severe Pain (7 - 10)      Care Discussed with Consultants/Other Providers [x] YES  [ ] NO    HEALTH ISSUES - PROBLEM Dx:  Pancreas cancer    Hypercalcemia    Hyperglycemia    Cancer related pain    Prophylactic measure    Abnormal LFTs    DM (diabetes mellitus)

## 2023-09-25 NOTE — PROGRESS NOTE ADULT - SUBJECTIVE AND OBJECTIVE BOX
SUBJECTIVE / OVERNIGHT EVENTS:          --------------------------------------------------------------------------------------------  LABS:                        9.3    12.05 )-----------( 231      ( 25 Sep 2023 06:24 )             27.8     09-25    133<L>  |  100  |  15  ----------------------------<  126<H>  3.4<L>   |  23  |  0.50    Ca    10.2      25 Sep 2023 06:23    TPro  6.2  /  Alb  3.1<L>  /  TBili  5.5<H>  /  DBili  x   /  AST  110<H>  /  ALT  216<H>  /  AlkPhos  392<H>  09-24    PT/INR - ( 25 Sep 2023 06:23 )   PT: 14.2 sec;   INR: 1.36 ratio           CAPILLARY BLOOD GLUCOSE      POCT Blood Glucose.: 128 mg/dL (25 Sep 2023 06:13)  POCT Blood Glucose.: 129 mg/dL (25 Sep 2023 00:53)  POCT Blood Glucose.: 142 mg/dL (24 Sep 2023 21:17)  POCT Blood Glucose.: 167 mg/dL (24 Sep 2023 16:51)  POCT Blood Glucose.: 172 mg/dL (24 Sep 2023 12:09)  POCT Blood Glucose.: 129 mg/dL (24 Sep 2023 08:15)        Urinalysis Basic - ( 25 Sep 2023 06:23 )    Color: x / Appearance: x / SG: x / pH: x  Gluc: 126 mg/dL / Ketone: x  / Bili: x / Urobili: x   Blood: x / Protein: x / Nitrite: x   Leuk Esterase: x / RBC: x / WBC x   Sq Epi: x / Non Sq Epi: x / Bacteria: x        RADIOLOGY & ADDITIONAL TESTS:    Imaging Personally Reviewed:  [x] YES  [ ] NO    Consultant(s) Notes Reviewed:  [x] YES  [ ] NO    MEDICATIONS  (STANDING):  atenolol  Tablet 50 milliGRAM(s) Oral daily  atorvastatin 40 milliGRAM(s) Oral at bedtime  BUpivacaine 0.25% (Preservative-Free) Injectable 4 milliLiter(s) Local Injection once  dextrose 5%. 1000 milliLiter(s) (50 mL/Hr) IV Continuous <Continuous>  dextrose 5%. 1000 milliLiter(s) (100 mL/Hr) IV Continuous <Continuous>  dextrose 50% Injectable 25 Gram(s) IV Push once  dextrose 50% Injectable 25 Gram(s) IV Push once  dextrose 50% Injectable 12.5 Gram(s) IV Push once  DULoxetine 20 milliGRAM(s) Oral daily  gabapentin 100 milliGRAM(s) Oral three times a day  glucagon  Injectable 1 milliGRAM(s) IntraMuscular once  indomethacin Suppository 100 milliGRAM(s) Rectal once  influenza  Vaccine (HIGH DOSE) 0.7 milliLiter(s) IntraMuscular once  insulin lispro (ADMELOG) corrective regimen sliding scale   SubCutaneous every 6 hours  pantoprazole    Tablet 40 milliGRAM(s) Oral two times a day  polyethylene glycol 3350 17 Gram(s) Oral once  polyethylene glycol 3350 17 Gram(s) Oral daily  senna 2 Tablet(s) Oral at bedtime  sodium chloride 0.9%. 1000 milliLiter(s) (75 mL/Hr) IV Continuous <Continuous>    MEDICATIONS  (PRN):  acetaminophen     Tablet .. 650 milliGRAM(s) Oral every 6 hours PRN Temp greater or equal to 38C (100.4F), Mild Pain (1 - 3)  ALPRAZolam 0.5 milliGRAM(s) Oral at bedtime PRN Anxiety, insomnia  aluminum hydroxide/magnesium hydroxide/simethicone Suspension 30 milliLiter(s) Oral every 4 hours PRN Dyspepsia  dextrose Oral Gel 15 Gram(s) Oral once PRN Blood Glucose LESS THAN 70 milliGRAM(s)/deciliter  melatonin 3 milliGRAM(s) Oral at bedtime PRN Insomnia  morphine  - Injectable 2 milliGRAM(s) IV Push every 4 hours PRN Intractable pain  ondansetron Injectable 4 milliGRAM(s) IV Push every 8 hours PRN Nausea and/or Vomiting  oxyCODONE    IR 10 milliGRAM(s) Oral every 4 hours PRN Severe Pain (7 - 10)  oxyCODONE    IR 5 milliGRAM(s) Oral every 4 hours PRN Moderate Pain (4 - 6)      Care Discussed with Consultants/Other Providers [x] YES  [ ] NO    Vital Signs Last 24 Hrs  T(C): 36.6 (24 Sep 2023 23:22), Max: 36.7 (24 Sep 2023 15:47)  T(F): 97.9 (24 Sep 2023 23:22), Max: 98 (24 Sep 2023 15:47)  HR: 80 (25 Sep 2023 05:19) (68 - 80)  BP: 122/76 (25 Sep 2023 05:19) (109/65 - 156/77)  BP(mean): --  RR: 18 (24 Sep 2023 23:22) (18 - 18)  SpO2: 93% (24 Sep 2023 23:22) (93% - 98%)    Parameters below as of 24 Sep 2023 23:22  Patient On (Oxygen Delivery Method): room air      I&O's Summary    24 Sep 2023 07:01  -  25 Sep 2023 07:00  --------------------------------------------------------  IN: 750 mL / OUT: 0 mL / NET: 750 mL           SUBJECTIVE / OVERNIGHT EVENTS:  off floor for ir guided biliary drain    --------------------------------------------------------------------------------------------  LABS:                        9.3    12.05 )-----------( 231      ( 25 Sep 2023 06:24 )             27.8     09-25    133<L>  |  100  |  15  ----------------------------<  126<H>  3.4<L>   |  23  |  0.50    Ca    10.2      25 Sep 2023 06:23    TPro  6.2  /  Alb  3.1<L>  /  TBili  5.5<H>  /  DBili  x   /  AST  110<H>  /  ALT  216<H>  /  AlkPhos  392<H>  09-24    PT/INR - ( 25 Sep 2023 06:23 )   PT: 14.2 sec;   INR: 1.36 ratio           CAPILLARY BLOOD GLUCOSE      POCT Blood Glucose.: 128 mg/dL (25 Sep 2023 06:13)  POCT Blood Glucose.: 129 mg/dL (25 Sep 2023 00:53)  POCT Blood Glucose.: 142 mg/dL (24 Sep 2023 21:17)  POCT Blood Glucose.: 167 mg/dL (24 Sep 2023 16:51)  POCT Blood Glucose.: 172 mg/dL (24 Sep 2023 12:09)  POCT Blood Glucose.: 129 mg/dL (24 Sep 2023 08:15)        Urinalysis Basic - ( 25 Sep 2023 06:23 )    Color: x / Appearance: x / SG: x / pH: x  Gluc: 126 mg/dL / Ketone: x  / Bili: x / Urobili: x   Blood: x / Protein: x / Nitrite: x   Leuk Esterase: x / RBC: x / WBC x   Sq Epi: x / Non Sq Epi: x / Bacteria: x        RADIOLOGY & ADDITIONAL TESTS:    Imaging Personally Reviewed:  [x] YES  [ ] NO    Consultant(s) Notes Reviewed:  [x] YES  [ ] NO    MEDICATIONS  (STANDING):  atenolol  Tablet 50 milliGRAM(s) Oral daily  atorvastatin 40 milliGRAM(s) Oral at bedtime  BUpivacaine 0.25% (Preservative-Free) Injectable 4 milliLiter(s) Local Injection once  dextrose 5%. 1000 milliLiter(s) (50 mL/Hr) IV Continuous <Continuous>  dextrose 5%. 1000 milliLiter(s) (100 mL/Hr) IV Continuous <Continuous>  dextrose 50% Injectable 25 Gram(s) IV Push once  dextrose 50% Injectable 25 Gram(s) IV Push once  dextrose 50% Injectable 12.5 Gram(s) IV Push once  DULoxetine 20 milliGRAM(s) Oral daily  gabapentin 100 milliGRAM(s) Oral three times a day  glucagon  Injectable 1 milliGRAM(s) IntraMuscular once  indomethacin Suppository 100 milliGRAM(s) Rectal once  influenza  Vaccine (HIGH DOSE) 0.7 milliLiter(s) IntraMuscular once  insulin lispro (ADMELOG) corrective regimen sliding scale   SubCutaneous every 6 hours  pantoprazole    Tablet 40 milliGRAM(s) Oral two times a day  polyethylene glycol 3350 17 Gram(s) Oral once  polyethylene glycol 3350 17 Gram(s) Oral daily  senna 2 Tablet(s) Oral at bedtime  sodium chloride 0.9%. 1000 milliLiter(s) (75 mL/Hr) IV Continuous <Continuous>    MEDICATIONS  (PRN):  acetaminophen     Tablet .. 650 milliGRAM(s) Oral every 6 hours PRN Temp greater or equal to 38C (100.4F), Mild Pain (1 - 3)  ALPRAZolam 0.5 milliGRAM(s) Oral at bedtime PRN Anxiety, insomnia  aluminum hydroxide/magnesium hydroxide/simethicone Suspension 30 milliLiter(s) Oral every 4 hours PRN Dyspepsia  dextrose Oral Gel 15 Gram(s) Oral once PRN Blood Glucose LESS THAN 70 milliGRAM(s)/deciliter  melatonin 3 milliGRAM(s) Oral at bedtime PRN Insomnia  morphine  - Injectable 2 milliGRAM(s) IV Push every 4 hours PRN Intractable pain  ondansetron Injectable 4 milliGRAM(s) IV Push every 8 hours PRN Nausea and/or Vomiting  oxyCODONE    IR 10 milliGRAM(s) Oral every 4 hours PRN Severe Pain (7 - 10)  oxyCODONE    IR 5 milliGRAM(s) Oral every 4 hours PRN Moderate Pain (4 - 6)      Care Discussed with Consultants/Other Providers [x] YES  [ ] NO    Vital Signs Last 24 Hrs  T(C): 36.6 (24 Sep 2023 23:22), Max: 36.7 (24 Sep 2023 15:47)  T(F): 97.9 (24 Sep 2023 23:22), Max: 98 (24 Sep 2023 15:47)  HR: 80 (25 Sep 2023 05:19) (68 - 80)  BP: 122/76 (25 Sep 2023 05:19) (109/65 - 156/77)  BP(mean): --  RR: 18 (24 Sep 2023 23:22) (18 - 18)  SpO2: 93% (24 Sep 2023 23:22) (93% - 98%)    Parameters below as of 24 Sep 2023 23:22  Patient On (Oxygen Delivery Method): room air      I&O's Summary    24 Sep 2023 07:01  -  25 Sep 2023 07:00  --------------------------------------------------------  IN: 750 mL / OUT: 0 mL / NET: 750 mL

## 2023-09-25 NOTE — PROGRESS NOTE ADULT - ASSESSMENT
78y F newly dx'd pancreatic CA (not  yet on tx ) p/w weakness, black stools and dark urine x 3-4 days, jaundice, and  worsening abdominal pain admitted for pain control and obstructive jaundice.     Plan:    # Pancreas cancer w/ hepatic mets/ Biliary obstruction:  - + Transaminitis  - CT showing peripancreatic adenopathy and hepatic metastases w/ hepatic   lesions increase in size and number as compared with the prior MRI of  08/25/2023  - S/p MRCP 8/25/23 showed hepatic lesions as large as 2-3 cm as well as pancreatic lesion 4-5 cm. EGD/EUS from 9/5/23 showed duodenal invasion with ulceration- bx consistent w/ pancreatic adenocarcinoma, CT C/A/P on this admission showing increased liver mets, intra,extrahepatic mild/moderate biliary dilatation 2/2 to pancreatic head mass w/ bilirubin elevations  - S/p EGD/ERCP 9/21, unable to stent, concern for bleeding, hemostatic gel placed in area of possible bleeding, celiac nerve plexus block performed 9/21  - C/w PPI  - Trend CBC's and LFT's  - Pain control  - Diet per GI rec's-> clears  - Rec'ed for IR consult for biliary decompression -> today 9/25  - GI and Heme/ Onc following    # LE weakness and pain  - no effort on exam  - ct L spine with djd and foraminal narrowing ; no spinal stenosis   - MRI spine completed, follow up results     - LE Doppler pending r/o DVT  - Neuro following   -gabapentin 100 mg tid     # Hypercalcemia:  - Mild hyperCa i/s/o malignancy  - S/p IVF  - Avoid dehydration    # HTN/ HLD:  - Trend BP's  - C/w current meds    # Dm2:  - HgbA1c 7.7  - Continue to monitor blood glucose levels  - Sliding Scale    # Anxiety  - C/w current meds    # DVT ppx:  - IPC's  - Lovenox sq    [facility]

## 2023-09-25 NOTE — CHART NOTE - NSCHARTNOTEFT_GEN_A_CORE
Informed by vascular tech that patient has LLE below knee acute  soleal DVT. DW IR when to start AC post procedure(s/p biliary drain ) IR recommended to start tomorrow morning, if no bleeding or no drop in hB,  but no oral AC x 72 hrs.  DW Dr Nguyen, usually no need of AC for soleal radha DVT but to to start DVT prophylaxis dose  with Lovenox in am with care  ful monitoring for bleed. Will endorse to next shift.  Lisa Wood   Medicine Department

## 2023-09-25 NOTE — PROCEDURE NOTE - PROCEDURE FINDINGS AND DETAILS
10.5 Japanese internal external biliary drainage catheter    distal tip in the small bowel, proximal sidehole marker in the proximal left hepatic duct  to bag drainage

## 2023-09-25 NOTE — PRE-ANESTHESIA EVALUATION ADULT - NSANTHPMHFT_GEN_ALL_CORE
78y F newly dx'd pancreatic CA (not  yet on tx ) p/w weakness, black stools and dark urine x 3-4 days, jaundice, and  worsening abdominal pain admitted for pain control and obstructive jaundice.  HTN  DM 2 good ET no CP/SOB

## 2023-09-25 NOTE — PROGRESS NOTE ADULT - NS ATTEND AMEND GEN_ALL_CORE FT
Chart, labs, vitals, radiology reviewed. Above H&P reviewed and edited where appropriate. Agree with history and physical exam. Agree with assessment and plan. I reviewed the overnight course of events and discussed the care with the patient/ family.  All the decisions in assessment and plan are solely made by me.

## 2023-09-25 NOTE — PROGRESS NOTE ADULT - SUBJECTIVE AND OBJECTIVE BOX
Chief complaint  Patient is a 78y old  Female who presents with a chief complaint of black stool (25 Sep 2023 07:56)         Labs and Fingersticks  CAPILLARY BLOOD GLUCOSE      POCT Blood Glucose.: 148 mg/dL (25 Sep 2023 12:24)  POCT Blood Glucose.: 128 mg/dL (25 Sep 2023 06:13)  POCT Blood Glucose.: 129 mg/dL (25 Sep 2023 00:53)  POCT Blood Glucose.: 142 mg/dL (24 Sep 2023 21:17)  POCT Blood Glucose.: 167 mg/dL (24 Sep 2023 16:51)      Anion Gap: 10 (09-25 @ 06:23)  Anion Gap: 8 (09-24 @ 07:07)      Calcium: 10.2 (09-25 @ 06:23)  Calcium: 10.6 *H* (09-24 @ 07:07)  Albumin: 3.1 *L* (09-24 @ 07:07)    Alanine Aminotransferase (ALT/SGPT): 216 *H* (09-24 @ 07:07)  Alkaline Phosphatase: 392 *H* (09-24 @ 07:07)  Aspartate Aminotransferase (AST/SGOT): 110 *H* (09-24 @ 07:07)        09-25    133<L>  |  100  |  15  ----------------------------<  126<H>  3.4<L>   |  23  |  0.50    Ca    10.2      25 Sep 2023 06:23    TPro  6.2  /  Alb  3.1<L>  /  TBili  5.5<H>  /  DBili  x   /  AST  110<H>  /  ALT  216<H>  /  AlkPhos  392<H>  09-24                        9.3    12.05 )-----------( 231      ( 25 Sep 2023 06:24 )             27.8     Medications  MEDICATIONS  (STANDING):  atenolol  Tablet 50 milliGRAM(s) Oral daily  atorvastatin 40 milliGRAM(s) Oral at bedtime  BUpivacaine 0.25% (Preservative-Free) Injectable 4 milliLiter(s) Local Injection once  dextrose 5%. 1000 milliLiter(s) (50 mL/Hr) IV Continuous <Continuous>  dextrose 5%. 1000 milliLiter(s) (100 mL/Hr) IV Continuous <Continuous>  dextrose 50% Injectable 25 Gram(s) IV Push once  dextrose 50% Injectable 12.5 Gram(s) IV Push once  dextrose 50% Injectable 25 Gram(s) IV Push once  DULoxetine 20 milliGRAM(s) Oral daily  gabapentin 100 milliGRAM(s) Oral three times a day  glucagon  Injectable 1 milliGRAM(s) IntraMuscular once  indomethacin Suppository 100 milliGRAM(s) Rectal once  influenza  Vaccine (HIGH DOSE) 0.7 milliLiter(s) IntraMuscular once  insulin lispro (ADMELOG) corrective regimen sliding scale   SubCutaneous every 6 hours  pantoprazole    Tablet 40 milliGRAM(s) Oral two times a day  polyethylene glycol 3350 17 Gram(s) Oral daily  polyethylene glycol 3350 17 Gram(s) Oral once  senna 2 Tablet(s) Oral at bedtime  sodium chloride 0.9%. 1000 milliLiter(s) (75 mL/Hr) IV Continuous <Continuous>      Physical Exam  General: Patient comfortable in bed   Vital Signs Last 12 Hrs  T(F): 97.3 (09-25-23 @ 10:15), Max: 97.6 (09-25-23 @ 08:08)  HR: 68 (09-25-23 @ 12:15) (66 - 80)  BP: 151/69 (09-25-23 @ 12:15) (117/61 - 162/99)  BP(mean): 91 (09-25-23 @ 12:15) (91 - 99)  RR: 18 (09-25-23 @ 12:15) (13 - 19)  SpO2: 95% (09-25-23 @ 12:15) (95% - 100%)    CVS: S1S2   Respiratory: No wheezing, no crepitations  GI: Abdomen soft, bowel sounds positive  Musculoskeletal:  moves all extremities  : Voiding        Chief complaint  Patient is a 78y old  Female who presents with a chief complaint of black stool (25 Sep 2023 07:56)         Labs and Fingersticks  CAPILLARY BLOOD GLUCOSE      POCT Blood Glucose.: 148 mg/dL (25 Sep 2023 12:24)  POCT Blood Glucose.: 128 mg/dL (25 Sep 2023 06:13)  POCT Blood Glucose.: 129 mg/dL (25 Sep 2023 00:53)  POCT Blood Glucose.: 142 mg/dL (24 Sep 2023 21:17)  POCT Blood Glucose.: 167 mg/dL (24 Sep 2023 16:51)      Anion Gap: 10 (09-25 @ 06:23)  Anion Gap: 8 (09-24 @ 07:07)      Calcium: 10.2 (09-25 @ 06:23)  Calcium: 10.6 *H* (09-24 @ 07:07)  Albumin: 3.1 *L* (09-24 @ 07:07)    Alanine Aminotransferase (ALT/SGPT): 216 *H* (09-24 @ 07:07)  Alkaline Phosphatase: 392 *H* (09-24 @ 07:07)  Aspartate Aminotransferase (AST/SGOT): 110 *H* (09-24 @ 07:07)        09-25    133<L>  |  100  |  15  ----------------------------<  126<H>  3.4<L>   |  23  |  0.50    Ca    10.2      25 Sep 2023 06:23    TPro  6.2  /  Alb  3.1<L>  /  TBili  5.5<H>  /  DBili  x   /  AST  110<H>  /  ALT  216<H>  /  AlkPhos  392<H>  09-24                        9.3    12.05 )-----------( 231      ( 25 Sep 2023 06:24 )             27.8     Medications  MEDICATIONS  (STANDING):  atenolol  Tablet 50 milliGRAM(s) Oral daily  atorvastatin 40 milliGRAM(s) Oral at bedtime  BUpivacaine 0.25% (Preservative-Free) Injectable 4 milliLiter(s) Local Injection once  dextrose 5%. 1000 milliLiter(s) (50 mL/Hr) IV Continuous <Continuous>  dextrose 5%. 1000 milliLiter(s) (100 mL/Hr) IV Continuous <Continuous>  dextrose 50% Injectable 25 Gram(s) IV Push once  dextrose 50% Injectable 12.5 Gram(s) IV Push once  dextrose 50% Injectable 25 Gram(s) IV Push once  DULoxetine 20 milliGRAM(s) Oral daily  gabapentin 100 milliGRAM(s) Oral three times a day  glucagon  Injectable 1 milliGRAM(s) IntraMuscular once  indomethacin Suppository 100 milliGRAM(s) Rectal once  influenza  Vaccine (HIGH DOSE) 0.7 milliLiter(s) IntraMuscular once  insulin lispro (ADMELOG) corrective regimen sliding scale   SubCutaneous every 6 hours  pantoprazole    Tablet 40 milliGRAM(s) Oral two times a day  polyethylene glycol 3350 17 Gram(s) Oral daily  polyethylene glycol 3350 17 Gram(s) Oral once  senna 2 Tablet(s) Oral at bedtime  sodium chloride 0.9%. 1000 milliLiter(s) (75 mL/Hr) IV Continuous <Continuous>      Physical Exam  General: Patient comfortable in bed   Vital Signs Last 12 Hrs  T(F): 97.3 (09-25-23 @ 10:15), Max: 97.6 (09-25-23 @ 08:08)  HR: 68 (09-25-23 @ 12:15) (66 - 80)  BP: 151/69 (09-25-23 @ 12:15) (117/61 - 162/99)  BP(mean): 91 (09-25-23 @ 12:15) (91 - 99)  RR: 18 (09-25-23 @ 12:15) (13 - 19)  SpO2: 95% (09-25-23 @ 12:15) (95% - 100%)    CVS: S1S2   Respiratory: No wheezing, no crepitations  GI: Abdomen soft, bowel sounds positive  Musculoskeletal:  moves all extremities  : Voiding

## 2023-09-25 NOTE — PRE-ANESTHESIA EVALUATION ADULT - NSANTHOSAYNRD_GEN_A_CORE
No. YONY screening performed.  STOP BANG Legend: 0-2 = LOW Risk; 3-4 = INTERMEDIATE Risk; 5-8 = HIGH Risk
No. YONY screening performed.  STOP BANG Legend: 0-2 = LOW Risk; 3-4 = INTERMEDIATE Risk; 5-8 = HIGH Risk

## 2023-09-25 NOTE — PROCEDURE NOTE - PLAN
- monitor drain output: strict outputs  - 5cc NS forward flush only daily, do not aspirate  - monitor for signs of bleeding or sepsis  - may advance diet and resume preprocedure orders per primary team  - to bag drainage for now, IR will follow

## 2023-09-25 NOTE — PROGRESS NOTE ADULT - ASSESSMENT
Assessment  DMT2: 78y Female with newly diagnosed DM T2 with hyperglycemia admitted with abdominal pain,  A1C is 7.7%, on insulin coverage, blood sugars are trending down, within acceptable range no hypoglycemic episode,  eating partial meals.  In view of her malignancy, Tight sugar control is not recommended for this patient.  Metastatic pancreatic ca: Being evaluated/IR Tx, monitored, asymptomatic.  Calcium downtrending, stable  TSH euthyroid      Discussed plan and management with Dr Wendy Love NP - TEAMS  Jad Nguyen MD  Cell: [phone]  Office: [phone]          Assessment  DMT2: 78y Female with newly diagnosed DM T2 with hyperglycemia admitted with abdominal pain,  A1C is 7.7%, on insulin coverage, blood sugars are trending  down, within acceptable range no hypoglycemic episode,  eating partial meals.  In view of her malignancy, Tight sugar control is not recommended for this patient.  Metastatic pancreatic ca: Being evaluated/IR Tx, monitored, asymptomatic.  Calcium downtrending, stable  TSH euthyroid      Discussed plan and management with Dr Wendy Love NP - TEAMS  Jad Nguyen MD  Cell: [phone]  Office: [phone]

## 2023-09-26 NOTE — CHART NOTE - NSCHARTNOTEFT_GEN_A_CORE
Patient agreeable to transfer to [facility] for palliative RT for which consent was obtained. ACP night covering team notified to try to assist/ expedite process. Full consult note to follow.    Shirley Cowart, PGY5 Patient agreeable to transfer to [facility] for palliative RT for which consent was obtained. ACP night covering team notified to try to assist/ expedite process. Full consult note to follow.    Shirley Cowart  Chief Resident, PGY5  Radiation Medicine    Case d/w Dr. Nguyen.

## 2023-09-26 NOTE — PROGRESS NOTE ADULT - SUBJECTIVE AND OBJECTIVE BOX
Chief complaint  Patient is a 78y old  Female who presents with a chief complaint of black stool (26 Sep 2023 11:40)         Labs and Fingersticks  CAPILLARY BLOOD GLUCOSE      POCT Blood Glucose.: 175 mg/dL (26 Sep 2023 11:27)  POCT Blood Glucose.: 148 mg/dL (26 Sep 2023 07:47)  POCT Blood Glucose.: 143 mg/dL (25 Sep 2023 23:24)  POCT Blood Glucose.: 174 mg/dL (25 Sep 2023 20:39)  POCT Blood Glucose.: 127 mg/dL (25 Sep 2023 16:24)      Anion Gap: 14 (09-26 @ 06:54)  Anion Gap: 10 (09-25 @ 06:23)      Calcium: 11.1 *H* (09-26 @ 06:54)  Calcium: 10.2 (09-25 @ 06:23)  Albumin: 3.1 *L* (09-26 @ 06:54)    Alanine Aminotransferase (ALT/SGPT): 159 *H* (09-26 @ 06:54)  Alkaline Phosphatase: 376 *H* (09-26 @ 06:54)  Aspartate Aminotransferase (AST/SGOT): 56 *H* (09-26 @ 06:54)        09-26    131<L>  |  95<L>  |  13  ----------------------------<  147<H>  3.9   |  22  |  0.54    Ca    11.1<H>      26 Sep 2023 06:54    TPro  6.4  /  Alb  3.1<L>  /  TBili  2.8<H>  /  DBili  x   /  AST  56<H>  /  ALT  159<H>  /  AlkPhos  376<H>  09-26                        10.6   18.42 )-----------( 341      ( 26 Sep 2023 07:08 )             32.0     Medications  MEDICATIONS  (STANDING):  atenolol  Tablet 50 milliGRAM(s) Oral daily  atorvastatin 40 milliGRAM(s) Oral at bedtime  BUpivacaine 0.25% (Preservative-Free) Injectable 4 milliLiter(s) Local Injection once  dextrose 5%. 1000 milliLiter(s) (50 mL/Hr) IV Continuous <Continuous>  dextrose 5%. 1000 milliLiter(s) (100 mL/Hr) IV Continuous <Continuous>  dextrose 50% Injectable 25 Gram(s) IV Push once  dextrose 50% Injectable 25 Gram(s) IV Push once  dextrose 50% Injectable 12.5 Gram(s) IV Push once  DULoxetine 20 milliGRAM(s) Oral daily  gabapentin 100 milliGRAM(s) Oral three times a day  glucagon  Injectable 1 milliGRAM(s) IntraMuscular once  indomethacin Suppository 100 milliGRAM(s) Rectal once  influenza  Vaccine (HIGH DOSE) 0.7 milliLiter(s) IntraMuscular once  insulin lispro (ADMELOG) corrective regimen sliding scale   SubCutaneous at bedtime  insulin lispro (ADMELOG) corrective regimen sliding scale   SubCutaneous three times a day before meals  pantoprazole    Tablet 40 milliGRAM(s) Oral two times a day  polyethylene glycol 3350 17 Gram(s) Oral once  polyethylene glycol 3350 17 Gram(s) Oral daily  senna 2 Tablet(s) Oral at bedtime  sodium chloride 0.9%. 1000 milliLiter(s) (75 mL/Hr) IV Continuous <Continuous>      Physical Exam  General: Patient comfortable in bed   Vital Signs Last 12 Hrs  T(F): 98.1 (09-26-23 @ 13:45), Max: 98.1 (09-26-23 @ 08:28)  HR: 63 (09-26-23 @ 13:45) (63 - 75)  BP: 128/67 (09-26-23 @ 13:45) (128/67 - 179/96)  BP(mean): --  RR: 18 (09-26-23 @ 13:45) (18 - 18)  SpO2: 96% (09-26-23 @ 13:45) (94% - 96%)    CVS: S1S2   Respiratory: No wheezing, no crepitations  GI: Abdomen soft, bowel sounds positive  Musculoskeletal:  moves all extremities  : Voiding        Chief complaint  Patient is a 78y old  Female who presents with a chief complaint of black stool (26 Sep 2023 11:40)         Labs and Fingersticks  CAPILLARY BLOOD GLUCOSE      POCT Blood Glucose.: 175 mg/dL (26 Sep 2023 11:27)  POCT Blood Glucose.: 148 mg/dL (26 Sep 2023 07:47)  POCT Blood Glucose.: 143 mg/dL (25 Sep 2023 23:24)  POCT Blood Glucose.: 174 mg/dL (25 Sep 2023 20:39)  POCT Blood Glucose.: 127 mg/dL (25 Sep 2023 16:24)      Anion Gap: 14 (09-26 @ 06:54)  Anion Gap: 10 (09-25 @ 06:23)      Calcium: 11.1 *H* (09-26 @ 06:54)  Calcium: 10.2 (09-25 @ 06:23)  Albumin: 3.1 *L* (09-26 @ 06:54)    Alanine Aminotransferase (ALT/SGPT): 159 *H* (09-26 @ 06:54)  Alkaline Phosphatase: 376 *H* (09-26 @ 06:54)  Aspartate Aminotransferase (AST/SGOT): 56 *H* (09-26 @ 06:54)        09-26    131<L>  |  95<L>  |  13  ----------------------------<  147<H>  3.9   |  22  |  0.54    Ca    11.1<H>      26 Sep 2023 06:54    TPro  6.4  /  Alb  3.1<L>  /  TBili  2.8<H>  /  DBili  x   /  AST  56<H>  /  ALT  159<H>  /  AlkPhos  376<H>  09-26                        10.6   18.42 )-----------( 341      ( 26 Sep 2023 07:08 )             32.0     Medications  MEDICATIONS  (STANDING):  atenolol  Tablet 50 milliGRAM(s) Oral daily  atorvastatin 40 milliGRAM(s) Oral at bedtime  BUpivacaine 0.25% (Preservative-Free) Injectable 4 milliLiter(s) Local Injection once  dextrose 5%. 1000 milliLiter(s) (50 mL/Hr) IV Continuous <Continuous>  dextrose 5%. 1000 milliLiter(s) (100 mL/Hr) IV Continuous <Continuous>  dextrose 50% Injectable 25 Gram(s) IV Push once  dextrose 50% Injectable 25 Gram(s) IV Push once  dextrose 50% Injectable 12.5 Gram(s) IV Push once  DULoxetine 20 milliGRAM(s) Oral daily  gabapentin 100 milliGRAM(s) Oral three times a day  glucagon  Injectable 1 milliGRAM(s) IntraMuscular once  indomethacin Suppository 100 milliGRAM(s) Rectal once  influenza  Vaccine (HIGH DOSE) 0.7 milliLiter(s) IntraMuscular once  insulin lispro (ADMELOG) corrective regimen sliding scale   SubCutaneous at bedtime  insulin lispro (ADMELOG) corrective regimen sliding scale   SubCutaneous three times a day before meals  pantoprazole    Tablet 40 milliGRAM(s) Oral two times a day  polyethylene glycol 3350 17 Gram(s) Oral once  polyethylene glycol 3350 17 Gram(s) Oral daily  senna 2 Tablet(s) Oral at bedtime  sodium chloride 0.9%. 1000 milliLiter(s) (75 mL/Hr) IV Continuous <Continuous>      Physical Exam  General: Patient comfortable in bed   Vital Signs Last 12 Hrs  T(F): 98.1 (09-26-23 @ 13:45), Max: 98.1 (09-26-23 @ 08:28)  HR: 63 (09-26-23 @ 13:45) (63 - 75)  BP: 128/67 (09-26-23 @ 13:45) (128/67 - 179/96)  BP(mean): --  RR: 18 (09-26-23 @ 13:45) (18 - 18)  SpO2: 96% (09-26-23 @ 13:45) (94% - 96%)    CVS: S1S2   Respiratory: No wheezing, no crepitations  GI: Abdomen soft, bowel sounds positive  Musculoskeletal:  moves all extremities  : Voiding

## 2023-09-26 NOTE — PROGRESS NOTE ADULT - SUBJECTIVE AND OBJECTIVE BOX
SUBJECTIVE / OVERNIGHT EVENTS:    Patient seen and examined at bedside. Slept poorly overnight. Currently resting in bed. S/p drain yesterday.    --------------------------------------------------------------------------------------------  LABS:                        10.6   18.42 )-----------( 341      ( 26 Sep 2023 07:08 )             32.0     09-26    131<L>  |  95<L>  |  13  ----------------------------<  147<H>  3.9   |  22  |  0.54    Ca    11.1<H>      26 Sep 2023 06:54    TPro  6.4  /  Alb  3.1<L>  /  TBili  2.8<H>  /  DBili  x   /  AST  56<H>  /  ALT  159<H>  /  AlkPhos  376<H>  09-26    PT/INR - ( 25 Sep 2023 06:23 )   PT: 14.2 sec;   INR: 1.36 ratio           CAPILLARY BLOOD GLUCOSE      POCT Blood Glucose.: 148 mg/dL (26 Sep 2023 07:47)  POCT Blood Glucose.: 143 mg/dL (25 Sep 2023 23:24)  POCT Blood Glucose.: 174 mg/dL (25 Sep 2023 20:39)  POCT Blood Glucose.: 127 mg/dL (25 Sep 2023 16:24)  POCT Blood Glucose.: 148 mg/dL (25 Sep 2023 12:24)        Urinalysis Basic - ( 26 Sep 2023 06:54 )    Color: x / Appearance: x / SG: x / pH: x  Gluc: 147 mg/dL / Ketone: x  / Bili: x / Urobili: x   Blood: x / Protein: x / Nitrite: x   Leuk Esterase: x / RBC: x / WBC x   Sq Epi: x / Non Sq Epi: x / Bacteria: x        RADIOLOGY & ADDITIONAL TESTS: < from: [facility] Duplex Lower Ext Vein Scan, Bilat (09.25.23 @ 15:34) >  IMPRESSION: There is acute, below the knee left soleal vein DVT.    < end of copied text >      Imaging Personally Reviewed:  [x] YES  [ ] NO    Consultant(s) Notes Reviewed:  [x] YES  [ ] NO    MEDICATIONS  (STANDING):  atenolol  Tablet 50 milliGRAM(s) Oral daily  atorvastatin 40 milliGRAM(s) Oral at bedtime  BUpivacaine 0.25% (Preservative-Free) Injectable 4 milliLiter(s) Local Injection once  dextrose 5%. 1000 milliLiter(s) (50 mL/Hr) IV Continuous <Continuous>  dextrose 5%. 1000 milliLiter(s) (100 mL/Hr) IV Continuous <Continuous>  dextrose 50% Injectable 25 Gram(s) IV Push once  dextrose 50% Injectable 12.5 Gram(s) IV Push once  dextrose 50% Injectable 25 Gram(s) IV Push once  DULoxetine 20 milliGRAM(s) Oral daily  gabapentin 100 milliGRAM(s) Oral three times a day  glucagon  Injectable 1 milliGRAM(s) IntraMuscular once  indomethacin Suppository 100 milliGRAM(s) Rectal once  influenza  Vaccine (HIGH DOSE) 0.7 milliLiter(s) IntraMuscular once  insulin lispro (ADMELOG) corrective regimen sliding scale   SubCutaneous three times a day before meals  insulin lispro (ADMELOG) corrective regimen sliding scale   SubCutaneous at bedtime  pantoprazole    Tablet 40 milliGRAM(s) Oral two times a day  polyethylene glycol 3350 17 Gram(s) Oral once  polyethylene glycol 3350 17 Gram(s) Oral daily  senna 2 Tablet(s) Oral at bedtime  sodium chloride 0.9%. 1000 milliLiter(s) (75 mL/Hr) IV Continuous <Continuous>    MEDICATIONS  (PRN):  acetaminophen     Tablet .. 650 milliGRAM(s) Oral every 6 hours PRN Temp greater or equal to 38C (100.4F), Mild Pain (1 - 3)  ALPRAZolam 0.5 milliGRAM(s) Oral at bedtime PRN Anxiety, insomnia  aluminum hydroxide/magnesium hydroxide/simethicone Suspension 30 milliLiter(s) Oral every 4 hours PRN Dyspepsia  dextrose Oral Gel 15 Gram(s) Oral once PRN Blood Glucose LESS THAN 70 milliGRAM(s)/deciliter  melatonin 3 milliGRAM(s) Oral at bedtime PRN Insomnia  morphine  - Injectable 2 milliGRAM(s) IV Push every 4 hours PRN Intractable pain  ondansetron Injectable 4 milliGRAM(s) IV Push every 8 hours PRN Nausea and/or Vomiting  ondansetron Injectable 4 milliGRAM(s) IV Push once PRN Nausea and/or Vomiting  oxyCODONE    IR 10 milliGRAM(s) Oral every 4 hours PRN Severe Pain (7 - 10)  oxyCODONE    IR 5 milliGRAM(s) Oral every 4 hours PRN Moderate Pain (4 - 6)      Care Discussed with Consultants/Other Providers [x] YES  [ ] NO    Vital Signs Last 24 Hrs  T(C): 36.7 (26 Sep 2023 08:28), Max: 36.7 (26 Sep 2023 08:28)  T(F): 98.1 (26 Sep 2023 08:28), Max: 98.1 (26 Sep 2023 08:28)  HR: 63 (26 Sep 2023 08:28) (63 - 75)  BP: 163/70 (26 Sep 2023 08:28) (100/63 - 179/96)  BP(mean): 91 (25 Sep 2023 12:15) (91 - 96)  RR: 18 (26 Sep 2023 08:28) (14 - 19)  SpO2: 94% (26 Sep 2023 08:28) (94% - 100%)    Parameters below as of 26 Sep 2023 08:28  Patient On (Oxygen Delivery Method): room air      I&O's Summary    25 Sep 2023 07:01  -  26 Sep 2023 07:00  --------------------------------------------------------  IN: 240 mL / OUT: 680 mL / NET: -440 mL    PHYSICAL EXAM:  GENERAL: NAD, AAOx3  HEAD:  Atraumatic, Normocephalic  EYES: EOMI; conjunctiva and sclera clear  NECK: Supple, No JVD, No LAD  CHEST/LUNG: B/L air entry; No wheezes, rales or rhonci   HEART: Regular rate and rhythm; No murmurs, rubs, or gallops  ABDOMEN: Soft, Nontender, Nondistended; Bowel sounds present. + drain  EXTREMITIES:  2+ Peripheral Pulses, No clubbing, cyanosis, or edema  SKIN: No rashes or lesions

## 2023-09-26 NOTE — CONSULT NOTE ADULT - CONSULT REASON
biliary obstruction
le pain
Biliary drain placement
Palliative RT for ulcerating pancreas mass
High Blood Sugars/DMT2

## 2023-09-26 NOTE — PROGRESS NOTE ADULT - ATTENDING COMMENTS
As above    Impression:    #1.  Metastatic pancreatic adenocarcinoma to liver  #2.  Obstructive jaundice due to #1, unable to be relieved by ERCP due to duodenal distortion/compression by pancreatic cancer  #3.  Abd pain due to #1, s/p EUS/celiac neurolysis with benefit.    Recommendations:    #1.  Follow CBC/LFTs  #2.  IR consult for consideration of percutaneous biliary drainage  #3.  Pain control as needed.
78y F newly dx'd pancreatic CA (not  yet on tx ) p/w weakness, black stools and dark urine x 3-4 days, jaundice, and  worsening abdominal pain admitted for pain control and obstructive jaundice. Oncology consulted for pancreatic cancer.    #pancreatic cancer  - f/w Dr. Velázquez, dx on persistent belly pain-> MRCP 8/25/23 showed hepatic lesions as large as 2-3 cm as well as pancreatic lesion 4-5 cm. EGD/EUS from 9/5/23 showed duodenal invasion with ulceration- bx consistent w/ pancreatic adenocarcinoma, CT C/A/P on this admission showing increased liver mets, intra,extrahepatic mild/moderate biliary dilatation 2/2 to pancreatic head mass w/ bilirubin elevations  - s/p EGD/ERCP 9/21, unable to stent, concern for bleeding, hemostatic gel placed in area of possible bleeding, celiac nerve plexus block performed 9/21  -Consider RT to stop bleeding. Reviewed case with rad onc, they will see patient and will provide us with recommendation.  - s/p percutaneous biliary drain placement 9/25/23 for elevated bilirubin  -now new LLE below knee soleal vein DVT, no requirement of therapeutic AC at this time. Please do serial doppler to r/o propagation of clot.
Pt with newly diagnosed advanced pancreatic cancer with obstructive jaundice, GIB from duodenal ulceration from cancer. Seen after celiac nerve plexus block procedure. Pt currently with improved pain 2/10 over Lower quadrant. Lying in stretcher, lungs clear, heart regular, skin jaundice. Family at bedside. Would appreciate if she could have IR evaluation for percutaneous stent for obstructive jaundice and radiation oncology evaluation for palliative RT for GIB from pancreatic erosion to duodenum.

## 2023-09-26 NOTE — CONSULT NOTE ADULT - ASSESSMENT
#Metastatic pancreatic adenomacarcinoma  #Liver mets  #Dark stool  #Biliary obstruction  -s/p EUS on 9/5 with Ulcerated, likely malignant mass seen in the apex of the duodenum extending to the region of the major papilla which was obliterated by the mass. A 3.8 cm hypoechoic mass was identified in the pancreatic head with  extension to the duodenal wall. Tissue was obtained from this exam, and  results are pending. However, the endosonographic appearance is highly  suspicious for malignancy. Fine needle biopsy performed. A few enlarged lymph nodes were visualized in the celiac region (level 20), peripancreatic region and satnam hepatis region.    Recommendations:  -NPO  -Plan for EGD/ERCP today  -PPI BID for now  -Trend hb  -Trend liver enzymes  -Rest per medical team    Recommendations preliminary until signed by attending.     Kei Strong MD  Gastroenterology/Hepatology Fellow  1st option: [phone] (text or call), ONLY available from 7:00 am to 5:00 pm.   **Contact on-call GI fellow via answering service ([phone]) from 5pm-7am AND on weekends/holidays**  2nd option: Available via [employer] Teams  3rd option: Pager: [phone]        
Ms. Thao is a 79yo with metastatic and locally advanced pancreatic cancer sent to hospital for black stools/ jaundice and dark urine, admitted at [facility] for obstructive jaundice. Hgb on admission was 13.1 (then 11.6 > 10.4 > 9.9> 9.6 >9.7 > 9.3) > 10.3), s/p ERCP noting large ulcerated and fungating mass at the duodenum sweep extending to the second portion of the duodenum with evidence of recent bleeding. Patient stable post IR biliary drainage.    Patient seen at bedside with  and son at bedside. We discussed palliative radiation in this setting, namely to control bleeding. We discussed both inpatient transfer to [facility] and outpatient treatment at [facility]. Patient concerned about her pain, need for insulin and episodes of hypotension. She does not feel comfortable with outpatient followup. We noted that the bleeding appears under control and her episode of melanoma has resolved with Hgb relatively stable. We discussed if patient is dischargeable with support services, that we can offer RT in the outpatient setting, or transition from inpatient to outpatient if things improve after the transfer to [facility].    We talked about the risks, benefits, acute and long term side effects, as well as expected treatment outcomes. She was given the opportunity to ask questions, which were answered to her apparent satisfaction. She provided written consent to proceed with radiation therapy. We will arrange for inpatient treatment with transfer to [facility].    - Please transfer to [facility], will plan for 5 fraction RT with CT Simulation and VSim the day prior.    Shirley Cowart  Chief Resident, PGY5  Radiation Medicine  TEAMS/ Cell [phone]    Case d/w Dr. Nguyen
  Assessment  DMT2: 78y Female with newly diagnosed DM T2 with hyperglycemia admitted with abdominal pain,  A1C is 7.7%, on insulin coverage, blood sugars are trending down, within acceptable range no hypoglycemic episode,  eating partial meals.  In view of her malignancy, Tight sugar control is not recommended for this patient.  Metastatic pancreatic ca: Being evaluated/IR Tx, monitored, asymptomatic.            Jad Nguyen MD  Cell:  [phone]  Office: [phone]

## 2023-09-26 NOTE — CHART NOTE - NSCHARTNOTEFT_GEN_A_CORE
Nutrition Follow Up Note  Patient seen for: 1st malnutrition follow up     Chart reviewed, events noted. "78y F newly dx'd pancreatic CA (not  yet on tx ) p/w weakness, black stools and dark urine x 3-4 days, jaundice, and  worsening abdominal pain admitted for pain control and obstructive jaundice."    Source: [x] Patient       [x] Current medical record       [] RN        [] Family at bedside       [] Other:    -If unable to interview patient: [] Trach/Vent/BiPAP  [] Disoriented/confused/inappropriate to interview    Diet Order:   Diet, Consistent Carbohydrate/No Snacks:   No Beef  No Pork  No Poultry (09-25-23)    - Is current order appropriate/adequate? [X] Yes  []  No:     - PO intake :   [] >75%  Adequate    [] 50-75%  Fair       [X] <50%  Poor    - Nutrition-related concerns:      - Hem/Onc: Pancreas cancer w/ hepatic mets/ Biliary obstruction s/p placement of billary drain on 9/25      - Endo: hx of diabetes, ordered for SSI      - Renal: hyponatremia, ordered for IV fluids       -GI:  Last BM 9/26/23  Bowel Regimen? [X] Yes- Senna and Miralax; ordered for Protonix     Weights: Drug Dosing Weight  Weight (kg): 56.7 (25 Sep 2023 08:45)  BMI (kg/m2): 22.9 (25 Sep 2023 08:45)  -- No further weights to comment on.     Nutritionally Pertinent MEDICATIONS  (STANDING):  atenolol  Tablet 50 milliGRAM(s) Oral daily  atorvastatin 40 milliGRAM(s) Oral at bedtime  dextrose 5%. 1000 milliLiter(s) (50 mL/Hr) IV Continuous <Continuous>  dextrose 5%. 1000 milliLiter(s) (100 mL/Hr) IV Continuous <Continuous>  dextrose 50% Injectable 25 Gram(s) IV Push once  dextrose 50% Injectable 25 Gram(s) IV Push once  dextrose 50% Injectable 12.5 Gram(s) IV Push once  DULoxetine 20 milliGRAM(s) Oral daily  gabapentin 100 milliGRAM(s) Oral three times a day  glucagon  Injectable 1 milliGRAM(s) IntraMuscular once  indomethacin Suppository 100 milliGRAM(s) Rectal once  insulin lispro (ADMELOG) corrective regimen sliding scale   SubCutaneous at bedtime  insulin lispro (ADMELOG) corrective regimen sliding scale   SubCutaneous three times a day before meals  pantoprazole    Tablet 40 milliGRAM(s) Oral two times a day  polyethylene glycol 3350 17 Gram(s) Oral once  polyethylene glycol 3350 17 Gram(s) Oral daily  senna 2 Tablet(s) Oral at bedtime  sodium chloride 0.9%. 1000 milliLiter(s) (75 mL/Hr) IV Continuous <Continuous>    MEDICATIONS  (PRN):  acetaminophen     Tablet .. 650 milliGRAM(s) Oral every 6 hours PRN Temp greater or equal to 38C (100.4F), Mild Pain (1 - 3)  ALPRAZolam 0.5 milliGRAM(s) Oral at bedtime PRN Anxiety, insomnia  aluminum hydroxide/magnesium hydroxide/simethicone Suspension 30 milliLiter(s) Oral every 4 hours PRN Dyspepsia  dextrose Oral Gel 15 Gram(s) Oral once PRN Blood Glucose LESS THAN 70 milliGRAM(s)/deciliter  morphine  - Injectable 2 milliGRAM(s) IV Push every 4 hours PRN Intractable pain  ondansetron Injectable 4 milliGRAM(s) IV Push every 8 hours PRN Nausea and/or Vomiting  ondansetron Injectable 4 milliGRAM(s) IV Push once PRN Nausea and/or Vomiting  oxyCODONE    IR 5 milliGRAM(s) Oral every 4 hours PRN Moderate Pain (4 - 6)  oxyCODONE    IR 10 milliGRAM(s) Oral every 4 hours PRN Severe Pain (7 - 10)      Pertinent Labs: 09-26 @ 06:54: Na 131<L>, BUN 13, Cr 0.54, <H>, K+ 3.9, Phos --, Mg --, Alk Phos 376<H>, ALT/SGPT 159<H>, AST/SGOT 56<H>, HbA1c --    A1C with Estimated Average Glucose Result: 7.7 % (09-21-23 @ 06:59)    Finger Sticks:  POCT Blood Glucose.: 148 mg/dL (09-26 @ 07:47)  POCT Blood Glucose.: 143 mg/dL (09-25 @ 23:24)  POCT Blood Glucose.: 174 mg/dL (09-25 @ 20:39)  POCT Blood Glucose.: 127 mg/dL (09-25 @ 16:24)  POCT Blood Glucose.: 148 mg/dL (09-25 @ 12:24)      Skin per nursing documentation: -- No pressure injuries noted per flow sheets   Edema:  -- No edema noted per flow sheets     Estimated Needs:   [X] no change since previous assessment  [] recalculated:     Previous Nutrition Diagnosis:   1. Acute Severe Malnutrition  Nutrition Diagnosis is: [X] ongoing  [] resolved [] not applicable     New Nutrition Diagnosis: [X] Not applicable    Nutrition Care Plan:  [X] In Progress  [] Achieved  [] Not applicable    Nutrition Interventions:     Education Provided:       [X] Yes: Discussed importance of adequate consumption of meals/supplements to optimize protein-energy intake. Encouraged small/frequent meals, nutrient dense snacks, prioritizing protein foods at meal time.   [] No:        Recommendations:      1. Continue current therapeutic dietary restriction of Consistent Carbohydrate w/ No Evening Snack. Fluids deferred to medical team. Diet texture/fluid consistencies based on SLP recommendations.   2. Recommend adding oral nutritional supplement: Parse Glucose support daily (300 kcal, 16 g protein in each) to optimize intake   3. Encourage PO intake and honor food preferences as able unless otherwise contraindicated.   4. RD remains available if needed for further diet education/ nutritional interventions, pt is aware.     Monitoring and Evaluation:   Continue to monitor nutritional intake, tolerance to diet prescription, weights, labs, skin integrity    RD remains available upon request and will follow up per protocol  Joanna Bustillos, MS,RDN,CDN AVAILABLE ON TEAMS

## 2023-09-26 NOTE — PROGRESS NOTE ADULT - ASSESSMENT
Assessment  DMT2: 78y Female with newly diagnosed DM T2 with hyperglycemia admitted with abdominal pain,  A1C is 7.7%, on insulin coverage, blood sugars are trending  down, within acceptable range no hypoglycemic episode,  eating partial meals.  In view of her malignancy, Tight sugar control is not recommended for this patient.  Metastatic pancreatic ca: Being evaluated/IR Tx, monitored, asymptomatic.  Calcium fluctuating, on hydration   TSH euthyroid      Discussed plan and management with Dr Wendy Love NP - TEAMS  Jad Nguyen MD  Cell: [phone]  Office: [phone]          Assessment  DMT2: 78y Female with newly diagnosed DM T2 with hyperglycemia admitted with abdominal pain,  A1C is 7.7%, on insulin coverage, blood sugars are trending  down, within  acceptable range no hypoglycemic episode,  eating partial meals.  In view of her malignancy, Tight sugar control is not recommended for this patient.  Metastatic pancreatic ca: Being evaluated/IR Tx, monitored, asymptomatic.  Calcium fluctuating, on hydration   TSH euthyroid      Discussed plan and management with Dr Wendy Love NP - TEAMS  Jad Nguyen MD  Cell: [phone]  Office: [phone]

## 2023-09-26 NOTE — PROGRESS NOTE ADULT - ASSESSMENT
78y F newly dx'd pancreatic CA (not  yet on tx ) p/w weakness, black stools and dark urine x 3-4 days, jaundice, and  worsening abdominal pain admitted for pain control and obstructive jaundice.     Plan:    # Pancreas cancer w/ hepatic mets/ Biliary obstruction:  - + Transaminitis  - CT showing peripancreatic adenopathy and hepatic metastases w/ hepatic   lesions increase in size and number as compared with the prior MRI of  08/25/2023  - S/p MRCP 8/25/23 showed hepatic lesions as large as 2-3 cm as well as pancreatic lesion 4-5 cm. EGD/EUS from 9/5/23 showed duodenal invasion with ulceration- bx consistent w/ pancreatic adenocarcinoma, CT C/A/P on this admission showing increased liver mets, intra,extrahepatic mild/moderate biliary dilatation 2/2 to pancreatic head mass w/ bilirubin elevations  - S/p EGD/ERCP 9/21, unable to stent, concern for bleeding, hemostatic gel placed in area of possible bleeding, celiac nerve plexus block performed 9/21  - C/w PPI  - Trend CBC's and LFT's  - Pain control  - Diet per GI rec's  - S/p billary drain 9/25  - Monitor drain output: strict outputs  - GI and Heme/ Onc and IR following    # LE weakness and pain 2/2 + DVT:  - ct L spine with djd and foraminal narrowing ; no spinal stenosis   - MRI spine reviewed    - LE Doppler w/ + DVT  - C/w current meds/ Pain control  - C/w Lovenox sq  - Neurology following     # Hypercalcemia/ Hyponatremia:  - Mild hyperCa i/s/o malignancy  - Trend NA  - S/p IVF  - Avoid dehydration    # HTN/ HLD:  - Trend BP's  - C/w current meds    # Dm2:  - HgbA1c 7.7  - Continue to monitor blood glucose levels  - Sliding Scale  - Endo following    # Anxiety  - C/w current meds    # DVT ppx:  - IPC's  - Lovenox sq    [employer]  [phone]

## 2023-09-26 NOTE — PROGRESS NOTE ADULT - ASSESSMENT
78y F newly dx'd pancreatic CA (not  yet on tx ) p/w weakness, black stools and dark urine x 3-4 days, jaundice, and  worsening abdominal pain admitted for pain control and obstructive jaundice. Oncology consulted for pancreatic cancer.    #pancreatic cancer  - f/w Dr. Velázquez, dx on persistent belly pain-> MRCP 8/25/23 showed hepatic lesions as large as 2-3 cm as well as pancreatic lesion 4-5 cm. EGD/EUS from 9/5/23 showed duodenal invasion with ulceration- bx consistent w/ pancreatic adenocarcinoma, CT C/A/P on this admission showing increased liver mets, intra,extrahepatic mild/moderate biliary dilatation 2/2 to pancreatic head mass w/ bilirubin elevations  - s/p EGD/ERCP 9/21, unable to stent, concern for bleeding, hemostatic gel placed in area of possible bleeding, celiac nerve plexus block performed 9/21  - recommend consult rad-onc for utility of RT to bleeding lesions  - s/p percutaneous biliary drain placement 9/25/23 for elevated bilirubin  - oncology will continue to follow 78y F newly dx'd pancreatic CA (not  yet on tx ) p/w weakness, black stools and dark urine x 3-4 days, jaundice, and  worsening abdominal pain admitted for pain control and obstructive jaundice. Oncology consulted for pancreatic cancer.    #pancreatic cancer  - f/w Dr. Velázquez, dx on persistent belly pain-> MRCP 8/25/23 showed hepatic lesions as large as 2-3 cm as well as pancreatic lesion 4-5 cm. EGD/EUS from 9/5/23 showed duodenal invasion with ulceration- bx consistent w/ pancreatic adenocarcinoma, CT C/A/P on this admission showing increased liver mets, intra,extrahepatic mild/moderate biliary dilatation 2/2 to pancreatic head mass w/ bilirubin elevations  - s/p EGD/ERCP 9/21, unable to stent, concern for bleeding, hemostatic gel placed in area of possible bleeding, celiac nerve plexus block performed 9/21  - pending rad-onc plan to start RT to bleeding lesions  - s/p percutaneous biliary drain placement 9/25/23 for elevated bilirubin  -now new LLE below knee soleal vein DVT, no requirement of therapeutic AC      Discussed w/ Dr. Zaki Miles MD, PhD  Heme/Onc Fellow  [id number]

## 2023-09-26 NOTE — PROGRESS NOTE ADULT - SUBJECTIVE AND OBJECTIVE BOX
[facility]      Seen earlier today [Please note that date of entry above  is the actual  DATE OF SERVICE] No new neurological symptoms reported. Remains stable neurologically.   - Today, patient is without complaints.          *****MEDICATIONS: Current medication reviewed and documented.    MEDICATIONS  (STANDING):  atenolol  Tablet 50 milliGRAM(s) Oral daily  atorvastatin 40 milliGRAM(s) Oral at bedtime  BUpivacaine 0.25% (Preservative-Free) Injectable 4 milliLiter(s) Local Injection once  chlorhexidine 2% Cloths 1 Application(s) Topical daily  dextrose 5%. 1000 milliLiter(s) (50 mL/Hr) IV Continuous <Continuous>  dextrose 5%. 1000 milliLiter(s) (100 mL/Hr) IV Continuous <Continuous>  dextrose 50% Injectable 25 Gram(s) IV Push once  dextrose 50% Injectable 25 Gram(s) IV Push once  dextrose 50% Injectable 12.5 Gram(s) IV Push once  DULoxetine 20 milliGRAM(s) Oral daily  gabapentin 100 milliGRAM(s) Oral three times a day  glucagon  Injectable 1 milliGRAM(s) IntraMuscular once  indomethacin Suppository 100 milliGRAM(s) Rectal once  influenza  Vaccine (HIGH DOSE) 0.7 milliLiter(s) IntraMuscular once  insulin lispro (ADMELOG) corrective regimen sliding scale   SubCutaneous at bedtime  insulin lispro (ADMELOG) corrective regimen sliding scale   SubCutaneous three times a day before meals  pantoprazole    Tablet 40 milliGRAM(s) Oral two times a day  polyethylene glycol 3350 17 Gram(s) Oral daily  polyethylene glycol 3350 17 Gram(s) Oral once  senna 2 Tablet(s) Oral at bedtime  sodium chloride 0.9%. 1000 milliLiter(s) (75 mL/Hr) IV Continuous <Continuous>    MEDICATIONS  (PRN):  acetaminophen     Tablet .. 650 milliGRAM(s) Oral every 6 hours PRN Temp greater or equal to 38C (100.4F), Mild Pain (1 - 3)  ALPRAZolam 0.5 milliGRAM(s) Oral at bedtime PRN Anxiety, insomnia  aluminum hydroxide/magnesium hydroxide/simethicone Suspension 30 milliLiter(s) Oral every 4 hours PRN Dyspepsia  dextrose Oral Gel 15 Gram(s) Oral once PRN Blood Glucose LESS THAN 70 milliGRAM(s)/deciliter  melatonin 3 milliGRAM(s) Oral at bedtime PRN Insomnia  morphine  - Injectable 2 milliGRAM(s) IV Push every 4 hours PRN Intractable pain  ondansetron Injectable 4 milliGRAM(s) IV Push once PRN Nausea and/or Vomiting  ondansetron Injectable 4 milliGRAM(s) IV Push every 8 hours PRN Nausea and/or Vomiting  oxyCODONE    IR 5 milliGRAM(s) Oral every 4 hours PRN Moderate Pain (4 - 6)  oxyCODONE    IR 10 milliGRAM(s) Oral every 4 hours PRN Severe Pain (7 - 10)          ***** VITAL SIGNS:   Vital Signs Last 24 Hrs      I&O's Summary    25 Sep 2023 07:01  -  26 Sep 2023 07:00  --------------------------------------------------------  IN: 240 mL / OUT: 680 mL / NET: -440 mL    26 Sep 2023 07:01  -  26 Sep 2023 16:23  --------------------------------------------------------  IN: 0 mL / OUT: 125 mL / NET: -125 mL             *****PHYSICAL EXAM:   alert oriented x2 attention comprehension are fair.  Able to name, repeat.   EOmi fundi not visualized   no nystagmus VFF to confrontation  Tongue is midline  Palate elevates symmetrically   Moving all 4 ext spontaneously no drift appreciated    Gait not assessed.            *****LAB AND IMAGING:                        10.6   18.42 )-----------( 341      ( 26 Sep 2023 07:08 )             32.0               09-26    131<L>  |  95<L>  |  13  ----------------------------<  147<H>  3.9   |  22  |  0.54    Ca    11.1<H>      26 Sep 2023 06:54    TPro  6.4  /  Alb  3.1<L>  /  TBili  2.8<H>  /  DBili  x   /  AST  56<H>  /  ALT  159<H>  /  AlkPhos  376<H>  09-26    PT/INR - ( 25 Sep 2023 06:23 )   PT: 14.2 sec;   INR: 1.36 ratio                            Urinalysis Basic - ( 26 Sep 2023 06:54 )    Color: x / Appearance: x / SG: x / pH: x  Gluc: 147 mg/dL / Ketone: x  / Bili: x / Urobili: x   Blood: x / Protein: x / Nitrite: x   Leuk Esterase: x / RBC: x / WBC x   Sq Epi: x / Non Sq Epi: x / Bacteria: x      [All pertinent recent Imaging/Reports reviewed]            *****A S S E S S M E N T   A N D   P L A N :  Excerpt from H&P,"     78y F newly dx'd pancreatic CA (not  yet on tx ) p/w weakness, black stools and dark urine x 3-4 days. Also endorse abd pain x 3 weeks and jaundice. Abm pain now only moderately relieved w/ percocet at home. Follows onco Dr Velázquez O/P, spoke to a staff member  at the office today who  advised her to come to ED for further eval.       ROS: Denies CP, SOB, palpitation, fever, cough, chills, dizziness, recent travel, sick contact    A 10-system ROS was performed and is negative except as noted above and/or in the HPI.          Problem/Recommendations 1: le pain of unclear etiology   there is no weakness, mostly giveway on exam   pt has no numbness on exam   no alarming features of saddle anesthesia or incontinence   likely spinal stenosis   ca high correct     mr thoracic  lumbar  spine no contrast  if no contraindications   can try cymbalta 20 mg daily for pain /adjustment d/o  pt eval         Problem/Recommendations 2: hyperbilirubin   awaiting bili stent   consider ursodiol         pt requesting psych consult for anxiety      Thank you for allowing me to participate in the care of this patient. Will continue to follow patient periodically. Please do not hesitate to call me if you have any  questions or if there has been a change in patients neurological status     ________________  Kristen Kingston MD  [facility] ([facility]  [phone]      33 minutes spent on total encounter; more than 50 % of the visit was  spent counseling about plan of care, compliance to diet/exercise and medication regimen and or  coordinating care by the attending physician.      It is advised that stroke patients follow up with SELAM Ross @ [phone] in 1- 2 weeks.   Others please follow up with Dr. Michael Nissenbaum [phone]

## 2023-09-26 NOTE — PROGRESS NOTE ADULT - SUBJECTIVE AND OBJECTIVE BOX
INTERVAL HPI/OVERNIGHT EVENTS:  Patient S&E at bedside. No complaints at this time. No F/C, CP, SOB, abdominal pain, N/V, rash, or edema      VITAL SIGNS:  T(F): 98.1 (09-26-23 @ 08:28)  HR: 63 (09-26-23 @ 08:28)  BP: 163/70 (09-26-23 @ 08:28)  RR: 18 (09-26-23 @ 08:28)  SpO2: 94% (09-26-23 @ 08:28)  Wt(kg): --    PHYSICAL EXAM:    Constitutional: NAD  Eyes: EOMI, sclera non-icteric  Neck: supple  Respiratory: no increased WOB, CTAB/L  Cardiovascular: RRR, S1, S2, no m/g/r  Gastrointestinal: soft, NTND, no masses palpable, no HSM  Extremities: no c/c/e  Neurological: AAOx3    MEDICATIONS  (STANDING):  atenolol  Tablet 50 milliGRAM(s) Oral daily  atorvastatin 40 milliGRAM(s) Oral at bedtime  BUpivacaine 0.25% (Preservative-Free) Injectable 4 milliLiter(s) Local Injection once  dextrose 5%. 1000 milliLiter(s) (50 mL/Hr) IV Continuous <Continuous>  dextrose 5%. 1000 milliLiter(s) (100 mL/Hr) IV Continuous <Continuous>  dextrose 50% Injectable 25 Gram(s) IV Push once  dextrose 50% Injectable 25 Gram(s) IV Push once  dextrose 50% Injectable 12.5 Gram(s) IV Push once  DULoxetine 20 milliGRAM(s) Oral daily  gabapentin 100 milliGRAM(s) Oral three times a day  glucagon  Injectable 1 milliGRAM(s) IntraMuscular once  indomethacin Suppository 100 milliGRAM(s) Rectal once  influenza  Vaccine (HIGH DOSE) 0.7 milliLiter(s) IntraMuscular once  insulin lispro (ADMELOG) corrective regimen sliding scale   SubCutaneous at bedtime  insulin lispro (ADMELOG) corrective regimen sliding scale   SubCutaneous three times a day before meals  pantoprazole    Tablet 40 milliGRAM(s) Oral two times a day  polyethylene glycol 3350 17 Gram(s) Oral once  polyethylene glycol 3350 17 Gram(s) Oral daily  senna 2 Tablet(s) Oral at bedtime  sodium chloride 0.9%. 1000 milliLiter(s) (75 mL/Hr) IV Continuous <Continuous>    MEDICATIONS  (PRN):  acetaminophen     Tablet .. 650 milliGRAM(s) Oral every 6 hours PRN Temp greater or equal to 38C (100.4F), Mild Pain (1 - 3)  ALPRAZolam 0.5 milliGRAM(s) Oral at bedtime PRN Anxiety, insomnia  aluminum hydroxide/magnesium hydroxide/simethicone Suspension 30 milliLiter(s) Oral every 4 hours PRN Dyspepsia  dextrose Oral Gel 15 Gram(s) Oral once PRN Blood Glucose LESS THAN 70 milliGRAM(s)/deciliter  melatonin 3 milliGRAM(s) Oral at bedtime PRN Insomnia  morphine  - Injectable 2 milliGRAM(s) IV Push every 4 hours PRN Intractable pain  ondansetron Injectable 4 milliGRAM(s) IV Push once PRN Nausea and/or Vomiting  ondansetron Injectable 4 milliGRAM(s) IV Push every 8 hours PRN Nausea and/or Vomiting  oxyCODONE    IR 10 milliGRAM(s) Oral every 4 hours PRN Severe Pain (7 - 10)  oxyCODONE    IR 5 milliGRAM(s) Oral every 4 hours PRN Moderate Pain (4 - 6)      LABS:                        10.6   18.42 )-----------( 341      ( 26 Sep 2023 07:08 )             32.0     09-26    131<L>  |  95<L>  |  13  ----------------------------<  147<H>  3.9   |  22  |  0.54    Ca    11.1<H>      26 Sep 2023 06:54    TPro  6.4  /  Alb  3.1<L>  /  TBili  2.8<H>  /  DBili  x   /  AST  56<H>  /  ALT  159<H>  /  AlkPhos  376<H>  09-26    PT/INR - ( 25 Sep 2023 06:23 )   PT: 14.2 sec;   INR: 1.36 ratio           Urinalysis Basic - ( 26 Sep 2023 06:54 )    Color: x / Appearance: x / SG: x / pH: x  Gluc: 147 mg/dL / Ketone: x  / Bili: x / Urobili: x   Blood: x / Protein: x / Nitrite: x   Leuk Esterase: x / RBC: x / WBC x   Sq Epi: x / Non Sq Epi: x / Bacteria: x        RADIOLOGY & ADDITIONAL TESTS:     INTERVAL HPI/OVERNIGHT EVENTS:  Patient S&E at bedside. has abdominal pain, no melena, leg swelling      VITAL SIGNS:  T(F): 98.1 (09-26-23 @ 08:28)  HR: 63 (09-26-23 @ 08:28)  BP: 163/70 (09-26-23 @ 08:28)  RR: 18 (09-26-23 @ 08:28)  SpO2: 94% (09-26-23 @ 08:28)  Wt(kg): --    PHYSICAL EXAM:    Constitutional: NAD  Neck: supple  Respiratory: no increased WOB,  Cardiovascular: RRR  Gastrointestinal: soft, ttp  Extremities: no c/c/e  Neurological: AAOx3    MEDICATIONS  (STANDING):  atenolol  Tablet 50 milliGRAM(s) Oral daily  atorvastatin 40 milliGRAM(s) Oral at bedtime  BUpivacaine 0.25% (Preservative-Free) Injectable 4 milliLiter(s) Local Injection once  dextrose 5%. 1000 milliLiter(s) (50 mL/Hr) IV Continuous <Continuous>  dextrose 5%. 1000 milliLiter(s) (100 mL/Hr) IV Continuous <Continuous>  dextrose 50% Injectable 25 Gram(s) IV Push once  dextrose 50% Injectable 25 Gram(s) IV Push once  dextrose 50% Injectable 12.5 Gram(s) IV Push once  DULoxetine 20 milliGRAM(s) Oral daily  gabapentin 100 milliGRAM(s) Oral three times a day  glucagon  Injectable 1 milliGRAM(s) IntraMuscular once  indomethacin Suppository 100 milliGRAM(s) Rectal once  influenza  Vaccine (HIGH DOSE) 0.7 milliLiter(s) IntraMuscular once  insulin lispro (ADMELOG) corrective regimen sliding scale   SubCutaneous at bedtime  insulin lispro (ADMELOG) corrective regimen sliding scale   SubCutaneous three times a day before meals  pantoprazole    Tablet 40 milliGRAM(s) Oral two times a day  polyethylene glycol 3350 17 Gram(s) Oral once  polyethylene glycol 3350 17 Gram(s) Oral daily  senna 2 Tablet(s) Oral at bedtime  sodium chloride 0.9%. 1000 milliLiter(s) (75 mL/Hr) IV Continuous <Continuous>    MEDICATIONS  (PRN):  acetaminophen     Tablet .. 650 milliGRAM(s) Oral every 6 hours PRN Temp greater or equal to 38C (100.4F), Mild Pain (1 - 3)  ALPRAZolam 0.5 milliGRAM(s) Oral at bedtime PRN Anxiety, insomnia  aluminum hydroxide/magnesium hydroxide/simethicone Suspension 30 milliLiter(s) Oral every 4 hours PRN Dyspepsia  dextrose Oral Gel 15 Gram(s) Oral once PRN Blood Glucose LESS THAN 70 milliGRAM(s)/deciliter  melatonin 3 milliGRAM(s) Oral at bedtime PRN Insomnia  morphine  - Injectable 2 milliGRAM(s) IV Push every 4 hours PRN Intractable pain  ondansetron Injectable 4 milliGRAM(s) IV Push once PRN Nausea and/or Vomiting  ondansetron Injectable 4 milliGRAM(s) IV Push every 8 hours PRN Nausea and/or Vomiting  oxyCODONE    IR 10 milliGRAM(s) Oral every 4 hours PRN Severe Pain (7 - 10)  oxyCODONE    IR 5 milliGRAM(s) Oral every 4 hours PRN Moderate Pain (4 - 6)      LABS:                        10.6   18.42 )-----------( 341      ( 26 Sep 2023 07:08 )             32.0     09-26    131<L>  |  95<L>  |  13  ----------------------------<  147<H>  3.9   |  22  |  0.54    Ca    11.1<H>      26 Sep 2023 06:54    TPro  6.4  /  Alb  3.1<L>  /  TBili  2.8<H>  /  DBili  x   /  AST  56<H>  /  ALT  159<H>  /  AlkPhos  376<H>  09-26    PT/INR - ( 25 Sep 2023 06:23 )   PT: 14.2 sec;   INR: 1.36 ratio           Urinalysis Basic - ( 26 Sep 2023 06:54 )    Color: x / Appearance: x / SG: x / pH: x  Gluc: 147 mg/dL / Ketone: x  / Bili: x / Urobili: x   Blood: x / Protein: x / Nitrite: x   Leuk Esterase: x / RBC: x / WBC x   Sq Epi: x / Non Sq Epi: x / Bacteria: x        RADIOLOGY & ADDITIONAL TESTS:

## 2023-09-26 NOTE — CONSULT NOTE ADULT - SUBJECTIVE AND OBJECTIVE BOX
Admission HPI: 78y F newly dx'd pancreatic CA (not  yet on tx ) p/w weakness, black stools and dark urine x 3-4 days. Also endorse abd pain x 3 weeks and jaundice. Abm pain now only moderately relieved w/ percocet at home. Follows onco Dr Velázquez O/P, spoke to a staff member  at the office today who  advised her to come to ED for further eval.  ROS: Denies CP, SOB, palpitation, fever, cough, chills, dizziness, recent travel, sick contact    Radiation Medicine: Patient seen at bedside, doing well overall, notes she has been ambulating around today. She has pain the abdomen, mildly tender. Denies any further episodes of melena and improving urine color. She notes receiving insulin today.    Allergies No Known Allergies    PAST MEDICAL & SURGICAL HISTORY:  Pancreas cancer  Fx Wrist left Orif 2006  S/P Bunionectomy bilateral    FAMILY HISTORY:  Family history of myelodysplasia (Mother)  Family history of aortic stenosis (Mother)  Family history of stomach cancer (Sibling)    MEDICATIONS  (STANDING):  atenolol  Tablet 50 milliGRAM(s) Oral daily  atorvastatin 40 milliGRAM(s) Oral at bedtime  BUpivacaine 0.25% (Preservative-Free) Injectable 4 milliLiter(s) Local Injection once  chlorhexidine 2% Cloths 1 Application(s) Topical daily  dextrose 5%. 1000 milliLiter(s) (50 mL/Hr) IV Continuous <Continuous>  dextrose 5%. 1000 milliLiter(s) (100 mL/Hr) IV Continuous <Continuous>  dextrose 50% Injectable 25 Gram(s) IV Push once  dextrose 50% Injectable 12.5 Gram(s) IV Push once  dextrose 50% Injectable 25 Gram(s) IV Push once  DULoxetine 20 milliGRAM(s) Oral daily  gabapentin 100 milliGRAM(s) Oral three times a day  glucagon  Injectable 1 milliGRAM(s) IntraMuscular once  indomethacin Suppository 100 milliGRAM(s) Rectal once  influenza  Vaccine (HIGH DOSE) 0.7 milliLiter(s) IntraMuscular once  insulin lispro (ADMELOG) corrective regimen sliding scale   SubCutaneous three times a day before meals  insulin lispro (ADMELOG) corrective regimen sliding scale   SubCutaneous at bedtime  pantoprazole    Tablet 40 milliGRAM(s) Oral two times a day  polyethylene glycol 3350 17 Gram(s) Oral once  polyethylene glycol 3350 17 Gram(s) Oral daily  senna 2 Tablet(s) Oral at bedtime  sodium chloride 0.9%. 1000 milliLiter(s) (75 mL/Hr) IV Continuous <Continuous>    MEDICATIONS  (PRN):  acetaminophen     Tablet .. 650 milliGRAM(s) Oral every 6 hours PRN Temp greater or equal to 38C (100.4F), Mild Pain (1 - 3)  ALPRAZolam 0.5 milliGRAM(s) Oral at bedtime PRN Anxiety, insomnia  aluminum hydroxide/magnesium hydroxide/simethicone Suspension 30 milliLiter(s) Oral every 4 hours PRN Dyspepsia  dextrose Oral Gel 15 Gram(s) Oral once PRN Blood Glucose LESS THAN 70 milliGRAM(s)/deciliter  melatonin 3 milliGRAM(s) Oral at bedtime PRN Insomnia  morphine  - Injectable 2 milliGRAM(s) IV Push every 4 hours PRN Intractable pain  ondansetron Injectable 4 milliGRAM(s) IV Push every 8 hours PRN Nausea and/or Vomiting  ondansetron Injectable 4 milliGRAM(s) IV Push once PRN Nausea and/or Vomiting  oxyCODONE    IR 5 milliGRAM(s) Oral every 4 hours PRN Moderate Pain (4 - 6)  oxyCODONE    IR 10 milliGRAM(s) Oral every 4 hours PRN Severe Pain (7 - 10)      PHYSICAL EXAM  Vital Signs Last 24 Hrs  T(C): 36.4 (26 Sep 2023 15:26), Max: 36.7 (26 Sep 2023 08:28)  T(F): 97.5 (26 Sep 2023 15:26), Max: 98.1 (26 Sep 2023 08:28)  HR: 54 (26 Sep 2023 15:26) (54 - 75)  BP: 142/80 (26 Sep 2023 15:26) (128/67 - 179/96)  BP(mean): --  RR: 18 (26 Sep 2023 15:26) (18 - 18)  SpO2: 94% (26 Sep 2023 15:26) (94% - 96%)    Parameters below as of 26 Sep 2023 15:26  Patient On (Oxygen Delivery Method): room air    KPS 80  General: Well nourished, well developed, no acute distress  HEENT: NC/AT; EOMI,   CV: NR, RR  Lungs:  no increased work of breathing  Abdomen: soft, tender midline on palpation  Neuro: AAOx3; cranial nerves II-XII grossly intact  Psych: Full affect; mood congruent  Skin: no visible rashes on limited examination    IMAGING/LABS/PATHOLOGY: I have personally reviewed the relevant labs, pathology, and imaging as noted in the HPI. Hgb trend appreciated, ERCP report reviewed.

## 2023-09-27 NOTE — DISCHARGE NOTE PROVIDER - NSDCFUSCHEDAPPT_GEN_ALL_CORE_FT
Kelly Velázquez  [facility]  Scheduled Appointment: 10/02/2023

## 2023-09-27 NOTE — DISCHARGE NOTE PROVIDER - DETAILS OF MALNUTRITION DIAGNOSIS/DIAGNOSES
This patient has been assessed with a concern for Malnutrition and was treated during this hospitalization for the following Nutrition diagnosis/diagnoses:     -  09/21/2023: Severe protein-calorie malnutrition

## 2023-09-27 NOTE — PROGRESS NOTE ADULT - SUBJECTIVE AND OBJECTIVE BOX
Chief complaint  Patient is a 78y old  Female who presents with a chief complaint of black stool (27 Sep 2023 14:17)         Labs and Fingersticks  CAPILLARY BLOOD GLUCOSE      POCT Blood Glucose.: 128 mg/dL (27 Sep 2023 11:21)  POCT Blood Glucose.: 116 mg/dL (27 Sep 2023 07:26)  POCT Blood Glucose.: 121 mg/dL (26 Sep 2023 21:58)  POCT Blood Glucose.: 179 mg/dL (26 Sep 2023 16:47)      Anion Gap: 12 (09-27 @ 07:11)  Anion Gap: 14 (09-26 @ 06:54)      Calcium: 11.8 *H* (09-27 @ 07:11)  Calcium: 11.1 *H* (09-26 @ 06:54)  Albumin: 3.1 *L* (09-26 @ 06:54)    Alanine Aminotransferase (ALT/SGPT): 159 *H* (09-26 @ 06:54)  Alkaline Phosphatase: 376 *H* (09-26 @ 06:54)  Aspartate Aminotransferase (AST/SGOT): 56 *H* (09-26 @ 06:54)        09-27    132<L>  |  95<L>  |  16  ----------------------------<  102<H>  3.7   |  25  |  0.59    Ca    11.8<H>      27 Sep 2023 07:11    TPro  6.4  /  Alb  3.1<L>  /  TBili  2.8<H>  /  DBili  x   /  AST  56<H>  /  ALT  159<H>  /  AlkPhos  376<H>  09-26                        10.5   14.91 )-----------( 325      ( 27 Sep 2023 07:14 )             31.4     Medications  MEDICATIONS  (STANDING):  atenolol  Tablet 50 milliGRAM(s) Oral daily  atorvastatin 40 milliGRAM(s) Oral at bedtime  BUpivacaine 0.25% (Preservative-Free) Injectable 4 milliLiter(s) Local Injection once  chlorhexidine 2% Cloths 1 Application(s) Topical daily  dextrose 5%. 1000 milliLiter(s) (50 mL/Hr) IV Continuous <Continuous>  dextrose 5%. 1000 milliLiter(s) (100 mL/Hr) IV Continuous <Continuous>  dextrose 50% Injectable 25 Gram(s) IV Push once  dextrose 50% Injectable 12.5 Gram(s) IV Push once  dextrose 50% Injectable 25 Gram(s) IV Push once  DULoxetine 20 milliGRAM(s) Oral daily  enoxaparin Injectable 40 milliGRAM(s) SubCutaneous every 24 hours  gabapentin 100 milliGRAM(s) Oral three times a day  glucagon  Injectable 1 milliGRAM(s) IntraMuscular once  indomethacin Suppository 100 milliGRAM(s) Rectal once  influenza  Vaccine (HIGH DOSE) 0.7 milliLiter(s) IntraMuscular once  insulin lispro (ADMELOG) corrective regimen sliding scale   SubCutaneous three times a day before meals  insulin lispro (ADMELOG) corrective regimen sliding scale   SubCutaneous at bedtime  multivitamin/minerals/iron Oral Solution (CENTRUM) 15 milliLiter(s) Oral daily  pantoprazole    Tablet 40 milliGRAM(s) Oral two times a day  polyethylene glycol 3350 17 Gram(s) Oral once  polyethylene glycol 3350 17 Gram(s) Oral daily  senna 2 Tablet(s) Oral at bedtime  sodium chloride 0.9%. 1000 milliLiter(s) (75 mL/Hr) IV Continuous <Continuous>  thiamine IVPB 500 milliGRAM(s) IV Intermittent daily      Physical Exam  General: Patient comfortable in bed   Vital Signs Last 12 Hrs  T(F): 98.1 (09-27-23 @ 08:44), Max: 98.1 (09-27-23 @ 08:44)  HR: 73 (09-27-23 @ 10:57) (71 - 78)  BP: 162/87 (09-27-23 @ 13:08) (128/83 - 168/89)  BP(mean): --  RR: 18 (09-27-23 @ 08:44) (18 - 18)  SpO2: 95% (09-27-23 @ 10:57) (95% - 95%)    CVS: S1S2   Respiratory: No wheezing, no crepitations  GI: Abdomen soft, bowel sounds positive  Musculoskeletal:  moves all extremities  : Voiding        Chief complaint  Patient is a 78y old  Female who presents with a chief complaint of black stool (27 Sep 2023 14:17)         Labs and Fingersticks  CAPILLARY BLOOD GLUCOSE      POCT Blood Glucose.: 128 mg/dL (27 Sep 2023 11:21)  POCT Blood Glucose.: 116 mg/dL (27 Sep 2023 07:26)  POCT Blood Glucose.: 121 mg/dL (26 Sep 2023 21:58)  POCT Blood Glucose.: 179 mg/dL (26 Sep 2023 16:47)      Anion Gap: 12 (09-27 @ 07:11)  Anion Gap: 14 (09-26 @ 06:54)      Calcium: 11.8 *H* (09-27 @ 07:11)  Calcium: 11.1 *H* (09-26 @ 06:54)  Albumin: 3.1 *L* (09-26 @ 06:54)    Alanine Aminotransferase (ALT/SGPT): 159 *H* (09-26 @ 06:54)  Alkaline Phosphatase: 376 *H* (09-26 @ 06:54)  Aspartate Aminotransferase (AST/SGOT): 56 *H* (09-26 @ 06:54)        09-27    132<L>  |  95<L>  |  16  ----------------------------<  102<H>  3.7   |  25  |  0.59    Ca    11.8<H>      27 Sep 2023 07:11    TPro  6.4  /  Alb  3.1<L>  /  TBili  2.8<H>  /  DBili  x   /  AST  56<H>  /  ALT  159<H>  /  AlkPhos  376<H>  09-26                        10.5   14.91 )-----------( 325      ( 27 Sep 2023 07:14 )             31.4     Medications  MEDICATIONS  (STANDING):  atenolol  Tablet 50 milliGRAM(s) Oral daily  atorvastatin 40 milliGRAM(s) Oral at bedtime  BUpivacaine 0.25% (Preservative-Free) Injectable 4 milliLiter(s) Local Injection once  chlorhexidine 2% Cloths 1 Application(s) Topical daily  dextrose 5%. 1000 milliLiter(s) (50 mL/Hr) IV Continuous <Continuous>  dextrose 5%. 1000 milliLiter(s) (100 mL/Hr) IV Continuous <Continuous>  dextrose 50% Injectable 25 Gram(s) IV Push once  dextrose 50% Injectable 12.5 Gram(s) IV Push once  dextrose 50% Injectable 25 Gram(s) IV Push once  DULoxetine 20 milliGRAM(s) Oral daily  enoxaparin Injectable 40 milliGRAM(s) SubCutaneous every 24 hours  gabapentin 100 milliGRAM(s) Oral three times a day  glucagon  Injectable 1 milliGRAM(s) IntraMuscular once  indomethacin Suppository 100 milliGRAM(s) Rectal once  influenza  Vaccine (HIGH DOSE) 0.7 milliLiter(s) IntraMuscular once  insulin lispro (ADMELOG) corrective regimen sliding scale   SubCutaneous three times a day before meals  insulin lispro (ADMELOG) corrective regimen sliding scale   SubCutaneous at bedtime  multivitamin/minerals/iron Oral Solution (CENTRUM) 15 milliLiter(s) Oral daily  pantoprazole    Tablet 40 milliGRAM(s) Oral two times a day  polyethylene glycol 3350 17 Gram(s) Oral once  polyethylene glycol 3350 17 Gram(s) Oral daily  senna 2 Tablet(s) Oral at bedtime  sodium chloride 0.9%. 1000 milliLiter(s) (75 mL/Hr) IV Continuous <Continuous>  thiamine IVPB 500 milliGRAM(s) IV Intermittent daily      Physical Exam  General: Patient comfortable in bed   Vital Signs Last 12 Hrs  T(F): 98.1 (09-27-23 @ 08:44), Max: 98.1 (09-27-23 @ 08:44)  HR: 73 (09-27-23 @ 10:57) (71 - 78)  BP: 162/87 (09-27-23 @ 13:08) (128/83 - 168/89)  BP(mean): --  RR: 18 (09-27-23 @ 08:44) (18 - 18)  SpO2: 95% (09-27-23 @ 10:57) (95% - 95%)    CVS: S1S2   Respiratory: No wheezing, no crepitations  GI: Abdomen soft, bowel sounds positive  Musculoskeletal:  moves all extremities  : Voiding

## 2023-09-27 NOTE — PROGRESS NOTE ADULT - SUBJECTIVE AND OBJECTIVE BOX
SUBJECTIVE / OVERNIGHT EVENTS:    patient seen and examined  resting comfortably in bed    --------------------------------------------------------------------------------------------  LABS:                        10.5   14.91 )-----------( 325      ( 27 Sep 2023 07:14 )             31.4     09-27    132<L>  |  95<L>  |  16  ----------------------------<  102<H>  3.7   |  25  |  0.59    Ca    11.8<H>      27 Sep 2023 07:11    TPro  6.4  /  Alb  3.1<L>  /  TBili  2.8<H>  /  DBili  x   /  AST  56<H>  /  ALT  159<H>  /  AlkPhos  376<H>  09-26      CAPILLARY BLOOD GLUCOSE      POCT Blood Glucose.: 116 mg/dL (27 Sep 2023 07:26)  POCT Blood Glucose.: 121 mg/dL (26 Sep 2023 21:58)  POCT Blood Glucose.: 179 mg/dL (26 Sep 2023 16:47)  POCT Blood Glucose.: 175 mg/dL (26 Sep 2023 11:27)        Urinalysis Basic - ( 27 Sep 2023 07:11 )    Color: x / Appearance: x / SG: x / pH: x  Gluc: 102 mg/dL / Ketone: x  / Bili: x / Urobili: x   Blood: x / Protein: x / Nitrite: x   Leuk Esterase: x / RBC: x / WBC x   Sq Epi: x / Non Sq Epi: x / Bacteria: x        RADIOLOGY & ADDITIONAL TESTS:    Imaging Personally Reviewed:  [x] YES  [ ] NO    Consultant(s) Notes Reviewed:  [x] YES  [ ] NO    MEDICATIONS  (STANDING):  atenolol  Tablet 50 milliGRAM(s) Oral daily  atorvastatin 40 milliGRAM(s) Oral at bedtime  BUpivacaine 0.25% (Preservative-Free) Injectable 4 milliLiter(s) Local Injection once  chlorhexidine 2% Cloths 1 Application(s) Topical daily  dextrose 5%. 1000 milliLiter(s) (50 mL/Hr) IV Continuous <Continuous>  dextrose 5%. 1000 milliLiter(s) (100 mL/Hr) IV Continuous <Continuous>  dextrose 50% Injectable 25 Gram(s) IV Push once  dextrose 50% Injectable 25 Gram(s) IV Push once  dextrose 50% Injectable 12.5 Gram(s) IV Push once  DULoxetine 20 milliGRAM(s) Oral daily  enoxaparin Injectable 40 milliGRAM(s) SubCutaneous every 24 hours  gabapentin 100 milliGRAM(s) Oral three times a day  glucagon  Injectable 1 milliGRAM(s) IntraMuscular once  indomethacin Suppository 100 milliGRAM(s) Rectal once  influenza  Vaccine (HIGH DOSE) 0.7 milliLiter(s) IntraMuscular once  insulin lispro (ADMELOG) corrective regimen sliding scale   SubCutaneous at bedtime  insulin lispro (ADMELOG) corrective regimen sliding scale   SubCutaneous three times a day before meals  pantoprazole    Tablet 40 milliGRAM(s) Oral two times a day  polyethylene glycol 3350 17 Gram(s) Oral daily  polyethylene glycol 3350 17 Gram(s) Oral once  senna 2 Tablet(s) Oral at bedtime  sodium chloride 0.9%. 1000 milliLiter(s) (75 mL/Hr) IV Continuous <Continuous>    MEDICATIONS  (PRN):  acetaminophen     Tablet .. 650 milliGRAM(s) Oral every 6 hours PRN Temp greater or equal to 38C (100.4F), Mild Pain (1 - 3)  ALPRAZolam 0.5 milliGRAM(s) Oral at bedtime PRN Anxiety, insomnia  aluminum hydroxide/magnesium hydroxide/simethicone Suspension 30 milliLiter(s) Oral every 4 hours PRN Dyspepsia  dextrose Oral Gel 15 Gram(s) Oral once PRN Blood Glucose LESS THAN 70 milliGRAM(s)/deciliter  melatonin 3 milliGRAM(s) Oral at bedtime PRN Insomnia  morphine  - Injectable 2 milliGRAM(s) IV Push every 4 hours PRN Intractable pain  ondansetron Injectable 4 milliGRAM(s) IV Push every 8 hours PRN Nausea and/or Vomiting  ondansetron Injectable 4 milliGRAM(s) IV Push once PRN Nausea and/or Vomiting  oxyCODONE    IR 5 milliGRAM(s) Oral every 4 hours PRN Moderate Pain (4 - 6)  oxyCODONE    IR 10 milliGRAM(s) Oral every 4 hours PRN Severe Pain (7 - 10)      Care Discussed with Consultants/Other Providers [x] YES  [ ] NO    Vital Signs Last 24 Hrs  T(C): 36.7 (27 Sep 2023 08:44), Max: 36.7 (26 Sep 2023 13:45)  T(F): 98.1 (27 Sep 2023 08:44), Max: 98.1 (26 Sep 2023 13:45)  HR: 71 (27 Sep 2023 08:44) (54 - 78)  BP: 168/89 (27 Sep 2023 08:44) (128/67 - 168/89)  BP(mean): --  RR: 18 (27 Sep 2023 08:44) (18 - 18)  SpO2: 95% (27 Sep 2023 08:44) (94% - 96%)    Parameters below as of 27 Sep 2023 08:44  Patient On (Oxygen Delivery Method): room air      I&O's Summary    26 Sep 2023 07:01  -  27 Sep 2023 07:00  --------------------------------------------------------  IN: 0 mL / OUT: 425 mL / NET: -425 mL    PHYSICAL EXAM:  GENERAL: NAD, AAOx3  HEAD:  Atraumatic, Normocephalic  EYES: EOMI; conjunctiva and sclera clear  NECK: Supple, No JVD, No LAD  CHEST/LUNG: B/L air entry; No wheezes, rales or rhonci   HEART: Regular rate and rhythm; No murmurs, rubs, or gallops  ABDOMEN: Soft, Nontender, Nondistended; Bowel sounds present. + drain  EXTREMITIES:  2+ Peripheral Pulses, No clubbing, cyanosis, or edema  SKIN: No rashes or lesions

## 2023-09-27 NOTE — PROVIDER CONTACT NOTE (OTHER) - SITUATION
Pt had blood glucose taken and fingerstick was 175. Pt was due for 1u of insulin and refused to take any insulin cliaming she "doesn't need it".
pt says she feels a little dizzy while sitting up

## 2023-09-27 NOTE — DISCHARGE NOTE PROVIDER - CARE PROVIDERS DIRECT ADDRESSES
,[email or web address]

## 2023-09-27 NOTE — PROGRESS NOTE ADULT - ASSESSMENT
Assessment  DMT2: 78y Female with newly diagnosed DM T2 with hyperglycemia admitted with abdominal pain,  A1C is 7.7%, on insulin coverage, blood sugars are trending  down, within  acceptable range no hypoglycemic episode,  eating partial meals.  In view of her malignancy, Tight sugar control is not recommended for this patient.  Metastatic pancreatic ca: Being evaluated/IR Tx, monitored, asymptomatic.  Calcium fluctuating, on hydration   TSH euthyroid      Discussed plan and management with Dr Wendy Love NP - TEAMS  Jad Nguyen MD  Cell: [phone]  Office: [phone]          Assessment  DMT2: 78y Female with newly diagnosed DM T2 with hyperglycemia admitted with abdominal pain,  A1C is 7.7%, on insulin coverage, blood sugars are trending down, within  acceptable range no hypoglycemic episode,  eating partial meals.  In view of her malignancy, Tight sugar control is not recommended for this patient.  Metastatic pancreatic ca: Being evaluated/IR Tx, monitored, asymptomatic.  Calcium fluctuating, on hydration   TSH euthyroid      Discussed plan and management with Dr Wendy Love NP - TEAMS  Jad Nguyen MD  Cell: [phone]  Office: [phone]

## 2023-09-27 NOTE — PROGRESS NOTE ADULT - SUBJECTIVE AND OBJECTIVE BOX
[facility]      Seen earlier today [Please note that date of entry above  is the actual  DATE OF SERVICE] No new neurological symptoms reported. Remains stable neurologically.   - Today, patient is without complaints.          *****MEDICATIONS: Current medication reviewed and documented.    MEDICATIONS  (STANDING):  atenolol  Tablet 50 milliGRAM(s) Oral daily  atorvastatin 40 milliGRAM(s) Oral at bedtime  BUpivacaine 0.25% (Preservative-Free) Injectable 4 milliLiter(s) Local Injection once  chlorhexidine 2% Cloths 1 Application(s) Topical daily  dextrose 5%. 1000 milliLiter(s) (50 mL/Hr) IV Continuous <Continuous>  dextrose 5%. 1000 milliLiter(s) (100 mL/Hr) IV Continuous <Continuous>  dextrose 50% Injectable 25 Gram(s) IV Push once  dextrose 50% Injectable 12.5 Gram(s) IV Push once  dextrose 50% Injectable 25 Gram(s) IV Push once  DULoxetine 20 milliGRAM(s) Oral daily  enoxaparin Injectable 40 milliGRAM(s) SubCutaneous every 24 hours  gabapentin 100 milliGRAM(s) Oral three times a day  glucagon  Injectable 1 milliGRAM(s) IntraMuscular once  indomethacin Suppository 100 milliGRAM(s) Rectal once  influenza  Vaccine (HIGH DOSE) 0.7 milliLiter(s) IntraMuscular once  insulin lispro (ADMELOG) corrective regimen sliding scale   SubCutaneous three times a day before meals  insulin lispro (ADMELOG) corrective regimen sliding scale   SubCutaneous at bedtime  multivitamin/minerals/iron Oral Solution (CENTRUM) 15 milliLiter(s) Oral daily  pantoprazole    Tablet 40 milliGRAM(s) Oral two times a day  polyethylene glycol 3350 17 Gram(s) Oral daily  polyethylene glycol 3350 17 Gram(s) Oral once  senna 2 Tablet(s) Oral at bedtime  sodium chloride 0.9%. 1000 milliLiter(s) (75 mL/Hr) IV Continuous <Continuous>  thiamine IVPB 500 milliGRAM(s) IV Intermittent daily    MEDICATIONS  (PRN):  acetaminophen     Tablet .. 650 milliGRAM(s) Oral every 6 hours PRN Temp greater or equal to 38C (100.4F), Mild Pain (1 - 3)  ALPRAZolam 0.5 milliGRAM(s) Oral at bedtime PRN Anxiety, insomnia  aluminum hydroxide/magnesium hydroxide/simethicone Suspension 30 milliLiter(s) Oral every 4 hours PRN Dyspepsia  dextrose Oral Gel 15 Gram(s) Oral once PRN Blood Glucose LESS THAN 70 milliGRAM(s)/deciliter  melatonin 3 milliGRAM(s) Oral at bedtime PRN Insomnia  morphine  - Injectable 2 milliGRAM(s) IV Push every 4 hours PRN Intractable pain  ondansetron Injectable 4 milliGRAM(s) IV Push once PRN Nausea and/or Vomiting  ondansetron Injectable 4 milliGRAM(s) IV Push every 8 hours PRN Nausea and/or Vomiting  oxyCODONE    IR 5 milliGRAM(s) Oral every 4 hours PRN Moderate Pain (4 - 6)  oxyCODONE    IR 10 milliGRAM(s) Oral every 4 hours PRN Severe Pain (7 - 10)          ***** VITAL SIGNS:   Vital Signs Last 24 Hrs      I&O's Summary    26 Sep 2023 07:01  -  27 Sep 2023 07:00  --------------------------------------------------------  IN: 0 mL / OUT: 425 mL / NET: -425 mL             *****PHYSICAL EXAM:       alert oriented x 3 attention comprehension are fair.  Able to name, repeat.   EOmi fundi not visualized   no nystagmus VFF to confrontation  Tongue is midline  Palate elevates symmetrically   Moving all 4 ext spontaneously no drift appreciated    Gait not assessed.            *****LAB AND IMAGING:                        10.5   14.91 )-----------( 325      ( 27 Sep 2023 07:14 )             31.4               09-27    132<L>  |  95<L>  |  16  ----------------------------<  102<H>  3.7   |  25  |  0.59    Ca    11.8<H>      27 Sep 2023 07:11    TPro  6.4  /  Alb  3.1<L>  /  TBili  2.8<H>  /  DBili  x   /  AST  56<H>  /  ALT  159<H>  /  AlkPhos  376<H>  09-26                       Urinalysis Basic - ( 27 Sep 2023 07:11 )    Color: x / Appearance: x / SG: x / pH: x  Gluc: 102 mg/dL / Ketone: x  / Bili: x / Urobili: x   Blood: x / Protein: x / Nitrite: x   Leuk Esterase: x / RBC: x / WBC x   Sq Epi: x / Non Sq Epi: x / Bacteria: x      [All pertinent recent Imaging/Reports reviewed]            *****A S S E S S M E N T   A N D   P L A N :  Excerpt from H&P,"     78y F newly dx'd pancreatic CA (not  yet on tx ) p/w weakness, black stools and dark urine x 3-4 days. Also endorse abd pain x 3 weeks and jaundice. Abm pain now only moderately relieved w/ percocet at home. Follows onco Dr Velázquez O/P, spoke to a staff member  at the office today who  advised her to come to ED for further eval.       ROS: Denies CP, SOB, palpitation, fever, cough, chills, dizziness, recent travel, sick contact    A 10-system ROS was performed and is negative except as noted above and/or in the HPI.          Problem/Recommendations 1: le pain of unclear etiology   there is no weakness, mostly giveway on exam   pt has no numbness on exam   no alarming features of saddle anesthesia or incontinence   likely spinal stenosis   ca high pls correct     mr thoracic  lumbar  spine no contrast  if no contraindications   can try cymbalta 20 mg daily for pain /adjustment d/o  pt eval   thiamine iv   mvi           Problem/Recommendations 2: hyperbilirubin   awaiting bili stent   consider ursodiol   trend       pt requesting psych consult for anxiety         Thank you for allowing me to participate in the care of this patient. Will continue to follow patient periodically. Please do not hesitate to call me if you have any  questions or if there has been a change in patients neurological status     ________________  Kristen Kingston MD  [facility] ([facility]  [phone]      33 minutes spent on total encounter; more than 50 % of the visit was  spent counseling about plan of care, compliance to diet/exercise and medication regimen and or  coordinating care by the attending physician.      It is advised that stroke patients follow up with SELAM Ross @ [phone] in 1- 2 weeks.   Others please follow up with Dr. Michael Nissenbaum [phone] [facility]      Seen earlier today [Please note that date of entry above  is the actual  DATE OF SERVICE] No new neurological symptoms reported. Remains stable neurologically.   - Today, patient is without complaints.          *****MEDICATIONS: Current medication reviewed and documented.    MEDICATIONS  (STANDING):  atenolol  Tablet 50 milliGRAM(s) Oral daily  atorvastatin 40 milliGRAM(s) Oral at bedtime  BUpivacaine 0.25% (Preservative-Free) Injectable 4 milliLiter(s) Local Injection once  chlorhexidine 2% Cloths 1 Application(s) Topical daily  dextrose 5%. 1000 milliLiter(s) (50 mL/Hr) IV Continuous <Continuous>  dextrose 5%. 1000 milliLiter(s) (100 mL/Hr) IV Continuous <Continuous>  dextrose 50% Injectable 25 Gram(s) IV Push once  dextrose 50% Injectable 12.5 Gram(s) IV Push once  dextrose 50% Injectable 25 Gram(s) IV Push once  DULoxetine 20 milliGRAM(s) Oral daily  enoxaparin Injectable 40 milliGRAM(s) SubCutaneous every 24 hours  gabapentin 100 milliGRAM(s) Oral three times a day  glucagon  Injectable 1 milliGRAM(s) IntraMuscular once  indomethacin Suppository 100 milliGRAM(s) Rectal once  influenza  Vaccine (HIGH DOSE) 0.7 milliLiter(s) IntraMuscular once  insulin lispro (ADMELOG) corrective regimen sliding scale   SubCutaneous three times a day before meals  insulin lispro (ADMELOG) corrective regimen sliding scale   SubCutaneous at bedtime  multivitamin/minerals/iron Oral Solution (CENTRUM) 15 milliLiter(s) Oral daily  pantoprazole    Tablet 40 milliGRAM(s) Oral two times a day  polyethylene glycol 3350 17 Gram(s) Oral daily  polyethylene glycol 3350 17 Gram(s) Oral once  senna 2 Tablet(s) Oral at bedtime  sodium chloride 0.9%. 1000 milliLiter(s) (75 mL/Hr) IV Continuous <Continuous>  thiamine IVPB 500 milliGRAM(s) IV Intermittent daily    MEDICATIONS  (PRN):  acetaminophen     Tablet .. 650 milliGRAM(s) Oral every 6 hours PRN Temp greater or equal to 38C (100.4F), Mild Pain (1 - 3)  ALPRAZolam 0.5 milliGRAM(s) Oral at bedtime PRN Anxiety, insomnia  aluminum hydroxide/magnesium hydroxide/simethicone Suspension 30 milliLiter(s) Oral every 4 hours PRN Dyspepsia  dextrose Oral Gel 15 Gram(s) Oral once PRN Blood Glucose LESS THAN 70 milliGRAM(s)/deciliter  melatonin 3 milliGRAM(s) Oral at bedtime PRN Insomnia  morphine  - Injectable 2 milliGRAM(s) IV Push every 4 hours PRN Intractable pain  ondansetron Injectable 4 milliGRAM(s) IV Push once PRN Nausea and/or Vomiting  ondansetron Injectable 4 milliGRAM(s) IV Push every 8 hours PRN Nausea and/or Vomiting  oxyCODONE    IR 5 milliGRAM(s) Oral every 4 hours PRN Moderate Pain (4 - 6)  oxyCODONE    IR 10 milliGRAM(s) Oral every 4 hours PRN Severe Pain (7 - 10)          ***** VITAL SIGNS:   Vital Signs Last 24 Hrs      I&O's Summary    26 Sep 2023 07:01  -  27 Sep 2023 07:00  --------------------------------------------------------  IN: 0 mL / OUT: 425 mL / NET: -425 mL             *****PHYSICAL EXAM:       alert oriented x 3 attention comprehension are fair.  Able to name, repeat.   EOmi fundi not visualized   no nystagmus VFF to confrontation  Tongue is midline  Palate elevates symmetrically   Moving all 4 ext spontaneously no drift appreciated    Gait not assessed.            *****LAB AND IMAGING:                        10.5   14.91 )-----------( 325      ( 27 Sep 2023 07:14 )             31.4               09-27    132<L>  |  95<L>  |  16  ----------------------------<  102<H>  3.7   |  25  |  0.59    Ca    11.8<H>      27 Sep 2023 07:11    TPro  6.4  /  Alb  3.1<L>  /  TBili  2.8<H>  /  DBili  x   /  AST  56<H>  /  ALT  159<H>  /  AlkPhos  376<H>  09-26                       Urinalysis Basic - ( 27 Sep 2023 07:11 )    Color: x / Appearance: x / SG: x / pH: x  Gluc: 102 mg/dL / Ketone: x  / Bili: x / Urobili: x   Blood: x / Protein: x / Nitrite: x   Leuk Esterase: x / RBC: x / WBC x   Sq Epi: x / Non Sq Epi: x / Bacteria: x      [All pertinent recent Imaging/Reports reviewed]            *****A S S E S S M E N T   A N D   P L A N :  Excerpt from H&P,"     78y F newly dx'd pancreatic CA (not  yet on tx ) p/w weakness, black stools and dark urine x 3-4 days. Also endorse abd pain x 3 weeks and jaundice. Abm pain now only moderately relieved w/ percocet at home. Follows onco Dr Velázquez O/P, spoke to a staff member  at the office today who  advised her to come to ED for further eval.       ROS: Denies CP, SOB, palpitation, fever, cough, chills, dizziness, recent travel, sick contact    A 10-system ROS was performed and is negative except as noted above and/or in the HPI.          Problem/Recommendations 1: le pain of unclear etiology   there is no weakness, mostly giveway on exam   pt has no numbness on exam   no alarming features of saddle anesthesia or incontinence   likely spinal stenosis   ca high pls correct     mr thoracic  lumbar  spine no contrast  if no contraindications subcentimeter abn likely old scar will need follow up   can try cymbalta 20 mg daily for pain /adjustment d/o  pt eval   thiamine iv   mvi             Problem/Recommendations 2: hyperbilirubin   awaiting bili stent   consider ursodiol   trend       pt requesting psych consult for anxiety         Thank you for allowing me to participate in the care of this patient. Will continue to follow patient periodically. Please do not hesitate to call me if you have any  questions or if there has been a change in patients neurological status     ________________  Kristen Kingston MD  [facility] ([facility]  [phone]      33 minutes spent on total encounter; more than 50 % of the visit was  spent counseling about plan of care, compliance to diet/exercise and medication regimen and or  coordinating care by the attending physician.      It is advised that stroke patients follow up with SELAM Ross @ [phone] in 1- 2 weeks.   Others please follow up with Dr. Michael Nissenbaum [phone]

## 2023-09-27 NOTE — DISCHARGE NOTE PROVIDER - HOSPITAL COURSE
HPI:  78y F newly dx'd pancreatic CA (not  yet on tx ) p/w weakness, black stools and dark urine x 3-4 days. Also endorse abd pain x 3 weeks and jaundice. Abm pain now only moderately relieved w/ percocet at home. Follows onco Dr Velázquez O/P, spoke to a staff member  at the office today who  advised her to come to ED for further eval.       ROS: Denies CP, SOB, palpitation, fever, cough, chills, dizziness, recent travel, sick contact    A 10-system ROS was performed and is negative except as noted above and/or in the HPI.   (20 Sep 2023 22:49)    Hospital Course:      Important Medication Changes and Reason:    Active or Pending Issues Requiring Follow-up:    Advanced Directives:   [ ] Full code  [ ] DNR  [ ] Hospice    Discharge Diagnoses:         HPI:  78y F newly dx'd pancreatic CA (not  yet on tx ) p/w weakness, black stools and dark urine x 3-4 days. Also endorse abd pain x 3 weeks and jaundice. Abm pain now only moderately relieved w/ percocet at home. Follows onco Dr Velázquez O/P, spoke to a staff member  at the office today who  advised her to come to ED for further eval.       ROS: Denies CP, SOB, palpitation, fever, cough, chills, dizziness, recent travel, sick contact    A 10-system ROS was performed and is negative except as noted above and/or in the HPI.   (20 Sep 2023 22:49)    Hospital Course:    79 y/o F with Newly dx pancr Cancer , admitted for abdominal pain and jaundice.   - MRCP 8/25/23 showed hepatic lesions as well as pancreatic lesion. EGD/EUS from 9/5/23 showed duodenal invasion with ulceration.   -CT C/A/P on this admission showing increased liver mets, intra,extrahepatic mild/moderate biliary dilatation 2/2 to pancreatic head mass w/ bilirubin elevations   - s/p EGD/ERCP 9/21, unable to stent, concern for bleeding, hemostatic gel placed in area of possible bleeding, celiac nerve plexus block performed  9/23  LE radiculopathy-CT L spine with arthritis  9/25  MRI T-L spine with stenosis and disc bulge  9/25 Doppler L soleal vein  acute DVT-no need of AC usually    9/25  s/p flo drain placement        Important Medication Changes and Reason:    Active or Pending Issues Requiring Follow-up:    Advanced Directives:   [ ] Full code  [ ] DNR  [ ] Hospice    Discharge Diagnoses:         HPI:  78y F newly dx'd pancreatic CA (not  yet on tx ) p/w weakness, black stools and dark urine x 3-4 days. Also endorse abd pain x 3 weeks and jaundice. Abm pain now only moderately relieved w/ percocet at home. Follows onco Dr Velázquez O/P, spoke to a staff member  at the office today who  advised her to come to ED for further eval.       ROS: Denies CP, SOB, palpitation, fever, cough, chills, dizziness, recent travel, sick contact    A 10-system ROS was performed and is negative except as noted above and/or in the HPI.   (20 Sep 2023 22:49)    Hospital Course:  77 y/o F admitted with obstructive jaundice .patient f/u by Dr. Velázquez, dx on persistent belly pain-> MRCP 8/25/23 showed hepatic lesions as large as 2-3 cm as well as pancreatic lesion 4-5 cm. EGD/EUS from 9/5/23 showed duodenal invasion with ulceration- bx consistent w/ pancreatic adenocarcinoma, CT C/A/P on this admission showing increased liver mets, intra, extrahepatic mild/moderate biliary dilatation 2/2 to pancreatic head mass w/ bilirubin elevations  - s/p EGD/ERCP 9/21, unable to stent, concern for bleeding, hemostatic gel placed in area of possible bleeding, celiac nerve plexus block performed 9/21  - s/p percutaneous biliary drain placement 9/25/23 for elevated bilirubin  -now new LLE below knee soleal vein DVT, no requirement of therapeutic AC      C/o LE radiculopathy, CT L spine with arthritis              Important Medication Changes and Reason:    Active or Pending Issues Requiring Follow-up:    Advanced Directives:   [ ] Full code  [ ] DNR  [ ] Hospice    Discharge Diagnoses:         HPI:  78y F newly dx'd pancreatic CA (not  yet on tx ) p/w weakness, black stools and dark urine x 3-4 days. Also endorse abd pain x 3 weeks and jaundice. Abm pain now only moderately relieved w/ percocet at home. Follows onco Dr Velázquez O/P, spoke to a staff member  at the office today who  advised her to come to ED for further eval.       ROS: Denies CP, SOB, palpitation, fever, cough, chills, dizziness, recent travel, sick contact    A 10-system ROS was performed and is negative except as noted above and/or in the HPI.   (20 Sep 2023 22:49)    Hospital Course:  79 y/o F admitted with obstructive jaundice .patient f/u by Dr. Velázquez, dx on persistent belly pain-> MRCP 8/25/23 showed hepatic lesions as large as 2-3 cm as well as pancreatic lesion 4-5 cm. EGD/EUS from 9/5/23 showed duodenal invasion with ulceration- bx consistent w/ pancreatic adenocarcinoma, CT C/A/P on this admission showing increased liver mets, intra, extrahepatic mild/moderate biliary dilatation 2/2 to pancreatic head mass w/ bilirubin elevations  - s/p EGD/ERCP 9/21, unable to stent, concern for bleeding, hemostatic gel placed in area of possible bleeding, celiac nerve plexus block performed 9/21  - s/p percutaneous biliary drain placement 9/25/23 for elevated bilirubin  -now new LLE below knee soleal vein DVT, no requirement of therapeutic AC      C/o LE radiculopathy, CT L spine with arthritis.   MR L spine :No enhancing intrathecal-paraspinal mass or abnormal leptomeningeal   enhancement.  2. L3-L4 disc bulge-spondylitic ridge complex, resulting in mild central   canal and left greater than right neural foramen stenosis with facet   arthrosis, contacting the left L3 nerve roots.  3. L4-L5 anterolisthesis with a disc bulge-spondylitic ridge complex and   facet arthrosis, resulting in mild central canal and right greater than   left neural foramen stenosis, contacting the right L4 nerve roots.  4. L5-S1 disc bulge, resulting in bilateral neural foramen stenosis with   facet arthrosis, contacting bilateral L5 nerve roots.                Important Medication Changes and Reason:    Active or Pending Issues Requiring Follow-up:    Advanced Directives:   [ ] Full code  [ ] DNR  [ ] Hospice    Discharge Diagnoses:         HPI:  78y F newly dx'd pancreatic CA (not  yet on tx ) p/w weakness, black stools and dark urine x 3-4 days. Also endorse abd pain x 3 weeks and jaundice. Abm pain now only moderately relieved w/ percocet at home. Follows onco Dr Velázquez O/P, spoke to a staff member  at the office today who  advised her to come to ED for further eval.       ROS: Denies CP, SOB, palpitation, fever, cough, chills, dizziness, recent travel, sick contact    Hospital Course:  77 y/o F admitted with obstructive jaundice .patient f/u by Dr. Velázquez, dx on persistent belly pain-> MRCP 8/25/23 showed hepatic lesions as large as 2-3 cm as well as pancreatic lesion 4-5 cm. EGD/EUS from 9/5/23 showed duodenal invasion with ulceration- bx consistent w/ pancreatic adenocarcinoma, CT C/A/P on this admission showing increased liver mets, intra, extrahepatic mild/moderate biliary dilatation 2/2 to pancreatic head mass w/ bilirubin elevations  - s/p EGD/ERCP 9/21, unable to stent, concern for bleeding, hemostatic gel placed in area of possible bleeding, celiac nerve plexus block performed 9/21  - s/p percutaneous biliary drain placement 9/25/23 for elevated bilirubin-now bilirubin is trending down.  -now new LLE below knee soleal vein DVT, no requirement of therapeutic AC  Rad onc recommended to transfer patient to [facility] for RT   C/o LE radiculopathy, CT L spine with arthritis.   MR L spine :No enhancing intrathecal-paraspinal mass or abnormal leptomeningeal   enhancement.  2. L3-L4 disc bulge-spondylitic ridge complex, resulting in mild central   canal and left greater than right neural foramen stenosis with facet   arthrosis, contacting the left L3 nerve roots.  3. L4-L5 anterolisthesis with a disc bulge-spondylitic ridge complex and   facet arthrosis, resulting in mild central canal and right greater than   left neural foramen stenosis, contacting the right L4 nerve roots.  4. L5-S1 disc bulge, resulting in bilateral neural foramen stenosis with   facet arthrosis, contacting bilateral L5 nerve roots.  neurology was consulted (Dr Giuliana Kingston)    Important Medication Changes and Reason:    Active or Pending Issues Requiring Follow-up:    Advanced Directives:   [ x] Full code  [ ] DNR  [ ] Hospice    Discharge Diagnoses:  Obstructive jaundice s/p biliary drain  Pancreatic mass with duodenal invasion with ulceration  LLE soleal DVT         HPI:  78y F newly dx'd pancreatic CA (not  yet on tx ) p/w weakness, black stools and dark urine x 3-4 days. Also endorse abd pain x 3 weeks and jaundice. Abm pain now only moderately relieved w/ percocet at home. Follows onco Dr Velázquez O/P, spoke to a staff member  at the office today who  advised her to come to ED for further eval.       ROS: Denies CP, SOB, palpitation, fever, cough, chills, dizziness, recent travel, sick contact    Hospital Course:  79 y/o F admitted with obstructive jaundice .patient f/u by Dr. Velázquez, dx on persistent belly pain-> MRCP 8/25/23 showed hepatic lesions as large as 2-3 cm as well as pancreatic lesion 4-5 cm. EGD/EUS from 9/5/23 showed duodenal invasion with ulceration- bx consistent w/ pancreatic adenocarcinoma, CT C/A/P on this admission showing increased liver mets, intra, extrahepatic mild/moderate biliary dilatation 2/2 to pancreatic head mass w/ bilirubin elevations  - s/p EGD/ERCP 9/21, unable to stent, concern for bleeding, hemostatic gel placed in area of possible bleeding, celiac nerve plexus block performed 9/21  - s/p percutaneous biliary drain placement 9/25/23 for elevated bilirubin-now bilirubin is trending down.  -now new LLE below knee soleal vein DVT, no requirement of therapeutic AC  Rad onc recommended to transfer patient to [facility] for RT   C/o LE radiculopathy, CT L spine with arthritis.   MR L spine :No enhancing intrathecal-paraspinal mass or abnormal leptomeningeal   enhancement.  2. L3-L4 disc bulge-spondylitic ridge complex, resulting in mild central   canal and left greater than right neural foramen stenosis with facet   arthrosis, contacting the left L3 nerve roots.  3. L4-L5 anterolisthesis with a disc bulge-spondylitic ridge complex and   facet arthrosis, resulting in mild central canal and right greater than   left neural foramen stenosis, contacting the right L4 nerve roots.  4. L5-S1 disc bulge, resulting in bilateral neural foramen stenosis with   facet arthrosis, contacting bilateral L5 nerve roots.  neurology was consulted (Dr Giuliana Kingston)  She was seen by GI, Endocrine, IR, Neurology, PT, Hm/Onc, Rad-Onco. She is hemodynamically stable to be discharged home today, spoke to family at bedside, spoke to Attending, CM.  Important Medication Changes and Reason:    Active or Pending Issues Requiring Follow-up:    Advanced Directives:   [ x] Full code  [ ] DNR  [ ] Hospice    Discharge Diagnoses:  Obstructive jaundice s/p biliary drain  Pancreatic mass with duodenal invasion with ulceration  LLE soleal DVT

## 2023-09-27 NOTE — DISCHARGE NOTE PROVIDER - CARE PROVIDER_API CALL
Chas Nguyen  Radiation Oncology  [address] [place] [id number]  Phone: [phone]  Fax: [phone]  Follow Up Time:     Kelly Velázquez  Hematology/Oncology  [address] [place] [id number]  Phone: [phone]  Fax: [phone]  Follow Up Time:    Chas Nguyen  Radiation Oncology  [address] [place] [id number]  Phone: [phone]  Fax: [phone]  Follow Up Time:     Kelly Velázquez  Hematology/Oncology  [address] [place] [id number]  Phone: [phone]  Fax: [phone]  Follow Up Time:     Emi Jim  PCP  Phone: (   )    -  Fax: (   )    -  Follow Up Time:

## 2023-09-27 NOTE — PROGRESS NOTE ADULT - ASSESSMENT
78y F newly dx'd pancreatic CA (not  yet on tx ) p/w weakness, black stools and dark urine x 3-4 days, jaundice, and  worsening abdominal pain admitted for pain control and obstructive jaundice.     Plan:    # Pancreas cancer w/ hepatic mets/ Biliary obstruction:  - + Transaminitis  - CT showing peripancreatic adenopathy and hepatic metastases w/ hepatic   lesions increase in size and number as compared with the prior MRI of  08/25/2023  - S/p MRCP 8/25/23 showed hepatic lesions as large as 2-3 cm as well as pancreatic lesion 4-5 cm. EGD/EUS from 9/5/23 showed duodenal invasion with ulceration- bx consistent w/ pancreatic adenocarcinoma, CT C/A/P on this admission showing increased liver mets, intra,extrahepatic mild/moderate biliary dilatation 2/2 to pancreatic head mass w/ bilirubin elevations  - S/p EGD/ERCP 9/21, unable to stent, concern for bleeding, hemostatic gel placed in area of possible bleeding, celiac nerve plexus block performed 9/21  - C/w PPI  - Trend CBC's and LFT's  - Pain control  - Diet per GI rec's  - S/p billary drain 9/25  - Monitor drain output: strict outputs  - Patient agreeable to tx to [facility] for palliative RT - plan for 5 fraction RT with CT Stimulation and VSim the day priorrr  - GI and Heme/ Onc and IR following    # LE weakness and pain 2/2 + DVT:  - ct L spine with djd and foraminal narrowing ; no spinal stenosis   - MRI spine reviewed    - LE Doppler w/ + DVT  - C/w current meds/ Pain control  - C/w Lovenox sq  - Neurology following     # Hypercalcemia/ Hyponatremia:  - Mild hyperCa i/s/o malignancy  - Trend NA  - S/p IVF  - Avoid dehydration    # HTN/ HLD:  - Trend BP's  - C/w current meds    # Dm2:  - HgbA1c 7.7  - Continue to monitor blood glucose levels  - Sliding Scale  - Endo following    # Anxiety  - C/w current meds    # DVT ppx:  - IPC's  - Lovenox sq    [employer]  [phone]

## 2023-09-27 NOTE — DISCHARGE NOTE PROVIDER - NSDCCPCAREPLAN_GEN_ALL_CORE_FT
PRINCIPAL DISCHARGE DIAGNOSIS  Diagnosis: Pancreatic carcinoma metastatic to liver  Assessment and Plan of Treatment: tranferring to [facility] for RT. F/U with Dr Velázquez      SECONDARY DISCHARGE DIAGNOSES  Diagnosis: Obstructive jaundice  Assessment and Plan of Treatment: s/p biliary drain. Bili is improved.    Diagnosis: DVT, lower extremity  Assessment and Plan of Treatment: LLE soleal DVT. No need of therapeutic AC. F/U by Hem    Diagnosis: Back pain  Assessment and Plan of Treatment: See MR result. F/u by Neuro.     PRINCIPAL DISCHARGE DIAGNOSIS  Diagnosis: Pancreatic carcinoma metastatic to liver  Assessment and Plan of Treatment: f/up with hm/onc and rad-onc for op palliative radiation      SECONDARY DISCHARGE DIAGNOSES  Diagnosis: Obstructive jaundice  Assessment and Plan of Treatment: s/p biliary drain. Bili is improved. need flush the drain twice daily    Diagnosis: DVT, lower extremity  Assessment and Plan of Treatment: LLE soleal DVT. No need of therapeutic AC. F/U by Hem    Diagnosis: Back pain  Assessment and Plan of Treatment: See MR result. F/u by Neuro.     PRINCIPAL DISCHARGE DIAGNOSIS  Diagnosis: Pancreatic carcinoma metastatic to liver  Assessment and Plan of Treatment: f/up with hm/onc and rad-onc for op palliative radiation, PT AND FAMILY REFUSED TO GO TO [facility]      SECONDARY DISCHARGE DIAGNOSES  Diagnosis: Obstructive jaundice  Assessment and Plan of Treatment: s/p biliary drain. Bili is improved. need flush the drain twice daily    Diagnosis: DVT, lower extremity  Assessment and Plan of Treatment: LLE soleal DVT. No need of therapeutic AC. F/U by Hem    Diagnosis: Back pain  Assessment and Plan of Treatment: See MR result. F/u by Neuro.

## 2023-09-27 NOTE — DISCHARGE NOTE PROVIDER - PROVIDER TOKENS
PROVIDER:[TOKEN:[[id number]]],PROVIDER:[TOKEN:[[id number]]] PROVIDER:[TOKEN:[[id number]]],PROVIDER:[TOKEN:[[id number]]],FREE:[LAST:[Hernán],FIRST:[Emi],PHONE:[(   )    -],FAX:[(   )    -],ADDRESS:[PCP]]

## 2023-09-27 NOTE — DISCHARGE NOTE PROVIDER - NSDCMRMEDTOKEN_GEN_ALL_CORE_FT
atenolol 50 mg oral tablet: 1 orally once a day  Endocet 5 mg-325 mg oral tablet: 1 tab(s) orally every 4 hours, As needed, Mild Pain  pancrelipase 24,000 units-76,000 units-120,000 units oral delayed release capsule: 2 orally 3 times a day  pantoprazole 40 mg oral delayed release tablet: 1 tab(s) orally 2 times a day  polyethylene glycol 3350 oral powder for reconstitution: 17 gram(s) orally once a day  rosuvastatin 10 mg oral capsule: 1 orally once a day  senna leaf extract oral tablet: 2 tab(s) orally once a day (at bedtime)   atenolol 50 mg oral tablet: 1 tab(s) orally once a day  DULoxetine 20 mg oral delayed release capsule: 1 cap(s) orally once a day  Endocet 5 mg-325 mg oral tablet: 1 tab(s) orally every 4 hours, As needed, Mild Pain  gabapentin 100 mg oral capsule: 1 cap(s) orally 3 times a day  pancrelipase 24,000 units-76,000 units-120,000 units oral delayed release capsule: 2 orally 3 times a day  pantoprazole 40 mg oral delayed release tablet: 1 tab(s) orally 2 times a day  polyethylene glycol 3350 oral powder for reconstitution: 17 gram(s) orally once a day  rosuvastatin 10 mg oral capsule: 1 orally once a day  senna leaf extract oral tablet: 2 tab(s) orally once a day (at bedtime)

## 2023-09-28 NOTE — PROGRESS NOTE ADULT - PROBLEM SELECTOR PLAN 2
Calcium improving, continue hydration as tolerated, FU.
Calcium fluctuating, continue hydration as tolerated, FU.
- I/s/o know Pancreatic Ca w/hepatic mets   - ctm

## 2023-09-28 NOTE — DISCHARGE NOTE NURSING/CASE MANAGEMENT/SOCIAL WORK - PATIENT PORTAL LINK FT
You can access the FollowMyHealth Patient Portal offered by [facility] by registering at the following website: [email or web address]. By joining [facility]’s FollowMyHealth portal, you will also be able to view your health information using other applications (apps) compatible with our system.

## 2023-09-28 NOTE — PROGRESS NOTE ADULT - ASSESSMENT
Assessment  DMT2: 78y Female with newly diagnosed DM T2 with hyperglycemia admitted with abdominal pain,  A1C is 7.7%, on insulin coverage, blood sugars are trending down, within  acceptable range no hypoglycemic episode,  eating partial meals.  In view of her malignancy, Tight sugar control is not recommended for this patient.  Metastatic pancreatic ca: Being evaluated/IR Tx, monitored, asymptomatic. Rad onc fu   Calcium fluctuating, on hydration   TSH euthyroid      Discussed plan and management with Dr Wendy Love NP - TEAMS  Jad Nguyen MD  Cell: [phone]  Office: [phone]          Assessment  DMT2: 78y Female with newly diagnosed DM T2 with hyperglycemia admitted with abdominal pain,  A1C is 7.7%,  on insulin coverage, blood sugars are trending down, within  acceptable range no hypoglycemic episode,  eating partial meals.  In view of her malignancy, Tight sugar control is not recommended for this patient.  Metastatic pancreatic ca: Being evaluated/IR Tx, monitored, asymptomatic. Rad onc fu   Calcium fluctuating, on hydration   TSH euthyroid      Discussed plan and management with Dr Wendy Love NP - TEAMS  Jad Nguyen MD  Cell: [phone]  Office: [phone]

## 2023-09-28 NOTE — PROGRESS NOTE ADULT - PROBLEM SELECTOR PLAN 1
Will continue current insulin regimen for now. Tight sugar control not recommended.   In view of her malignancy and life expectancy suggest not to start her on any diabetic medications, counseled for low carb diet.
- New dx'd Pancreatic cancer   - C/o juandice, dark stool & urine. Noted to have elevated LFT, hyperbilirubinemia on labs,   which are likely 2/2 known pancreatic Ca    - Recent CT showing peripancreatic adenopathy and hepatic metastases w/ hepatic   lesions increase in size and number as compared with the prior MRI of  08/25/2023  - Has yet to start Tx for Ca, follows Dr Velázquez O/P. Will consult Onco, f/u recs  -GI consulted for ERCP/EUS, planning today 9/21

## 2023-09-28 NOTE — PROGRESS NOTE ADULT - SUBJECTIVE AND OBJECTIVE BOX
SUBJECTIVE / OVERNIGHT EVENTS:    Patient seen and examined at bedside. No events noted overnight. Resting comfortably in bed. Family at bedside      --------------------------------------------------------------------------------------------  LABS:                        10.5   14.91 )-----------( 325      ( 27 Sep 2023 07:14 )             31.4     09-27    132<L>  |  95<L>  |  16  ----------------------------<  102<H>  3.7   |  25  |  0.59    Ca    11.8<H>      27 Sep 2023 07:11        CAPILLARY BLOOD GLUCOSE      POCT Blood Glucose.: 110 mg/dL (28 Sep 2023 09:09)  POCT Blood Glucose.: 119 mg/dL (27 Sep 2023 21:10)  POCT Blood Glucose.: 142 mg/dL (27 Sep 2023 16:20)  POCT Blood Glucose.: 128 mg/dL (27 Sep 2023 11:21)        Urinalysis Basic - ( 27 Sep 2023 07:11 )    Color: x / Appearance: x / SG: x / pH: x  Gluc: 102 mg/dL / Ketone: x  / Bili: x / Urobili: x   Blood: x / Protein: x / Nitrite: x   Leuk Esterase: x / RBC: x / WBC x   Sq Epi: x / Non Sq Epi: x / Bacteria: x        RADIOLOGY & ADDITIONAL TESTS:     Imaging Personally Reviewed:  [x] YES  [ ] NO    Consultant(s) Notes Reviewed:  [x] YES  [ ] NO    MEDICATIONS  (STANDING):  atenolol  Tablet 50 milliGRAM(s) Oral daily  atorvastatin 40 milliGRAM(s) Oral at bedtime  BUpivacaine 0.25% (Preservative-Free) Injectable 4 milliLiter(s) Local Injection once  chlorhexidine 2% Cloths 1 Application(s) Topical daily  dextrose 5%. 1000 milliLiter(s) (50 mL/Hr) IV Continuous <Continuous>  dextrose 5%. 1000 milliLiter(s) (100 mL/Hr) IV Continuous <Continuous>  dextrose 50% Injectable 25 Gram(s) IV Push once  dextrose 50% Injectable 25 Gram(s) IV Push once  dextrose 50% Injectable 12.5 Gram(s) IV Push once  DULoxetine 20 milliGRAM(s) Oral daily  enoxaparin Injectable 40 milliGRAM(s) SubCutaneous every 24 hours  gabapentin 100 milliGRAM(s) Oral three times a day  glucagon  Injectable 1 milliGRAM(s) IntraMuscular once  indomethacin Suppository 100 milliGRAM(s) Rectal once  influenza  Vaccine (HIGH DOSE) 0.7 milliLiter(s) IntraMuscular once  insulin lispro (ADMELOG) corrective regimen sliding scale   SubCutaneous at bedtime  insulin lispro (ADMELOG) corrective regimen sliding scale   SubCutaneous three times a day before meals  multivitamin/minerals/iron Oral Solution (CENTRUM) 15 milliLiter(s) Oral daily  pantoprazole    Tablet 40 milliGRAM(s) Oral two times a day  polyethylene glycol 3350 17 Gram(s) Oral daily  polyethylene glycol 3350 17 Gram(s) Oral once  senna 2 Tablet(s) Oral at bedtime  sodium chloride 0.9%. 1000 milliLiter(s) (75 mL/Hr) IV Continuous <Continuous>  thiamine IVPB 500 milliGRAM(s) IV Intermittent daily    MEDICATIONS  (PRN):  acetaminophen     Tablet .. 650 milliGRAM(s) Oral every 6 hours PRN Temp greater or equal to 38C (100.4F), Mild Pain (1 - 3)  ALPRAZolam 0.5 milliGRAM(s) Oral at bedtime PRN Anxiety, insomnia  aluminum hydroxide/magnesium hydroxide/simethicone Suspension 30 milliLiter(s) Oral every 4 hours PRN Dyspepsia  dextrose Oral Gel 15 Gram(s) Oral once PRN Blood Glucose LESS THAN 70 milliGRAM(s)/deciliter  melatonin 3 milliGRAM(s) Oral at bedtime PRN Insomnia  morphine  - Injectable 2 milliGRAM(s) IV Push every 4 hours PRN Intractable pain  ondansetron Injectable 4 milliGRAM(s) IV Push once PRN Nausea and/or Vomiting  ondansetron Injectable 4 milliGRAM(s) IV Push every 8 hours PRN Nausea and/or Vomiting  oxyCODONE    IR 10 milliGRAM(s) Oral every 4 hours PRN Severe Pain (7 - 10)      Care Discussed with Consultants/Other Providers [x] YES  [ ] NO    Vital Signs Last 24 Hrs  T(C): 36.9 (28 Sep 2023 09:13), Max: 36.9 (28 Sep 2023 09:13)  T(F): 98.4 (28 Sep 2023 09:13), Max: 98.4 (28 Sep 2023 09:13)  HR: 59 (28 Sep 2023 09:13) (56 - 79)  BP: 150/77 (28 Sep 2023 09:13) (128/83 - 162/87)  BP(mean): --  RR: 18 (28 Sep 2023 09:13) (18 - 18)  SpO2: 96% (28 Sep 2023 09:13) (93% - 96%)    Parameters below as of 28 Sep 2023 09:13  Patient On (Oxygen Delivery Method): room air      I&O's Summary    27 Sep 2023 07:01  -  28 Sep 2023 07:00  --------------------------------------------------------  IN: 0 mL / OUT: 340 mL / NET: -340 mL    PHYSICAL EXAM:  GENERAL: NAD, AAOx3  HEAD:  Atraumatic, Normocephalic  EYES: EOMI; conjunctiva and sclera clear  NECK: Supple, No JVD, No LAD  CHEST/LUNG: B/L air entry; No wheezes, rales or rhonci   HEART: Regular rate and rhythm; No murmurs, rubs, or gallops  ABDOMEN: Soft, Nontender, Nondistended; Bowel sounds present. + drain  EXTREMITIES:  2+ Peripheral Pulses, No clubbing, cyanosis, or edema  SKIN: No rashes or lesions

## 2023-09-28 NOTE — PROGRESS NOTE ADULT - REASON FOR ADMISSION
black stool

## 2023-09-28 NOTE — PROGRESS NOTE ADULT - PROVIDER SPECIALTY LIST ADULT
Internal Medicine
Gastroenterology
Heme/Onc
Neurology
Rad Onc
Endocrinology
Heme/Onc
Internal Medicine
Neurology
Neurology
Endocrinology
Intervent Radiology
Internal Medicine
Endocrinology
Endocrinology

## 2023-09-28 NOTE — PROGRESS NOTE ADULT - ASSESSMENT
78y F newly dx'd pancreatic CA (not  yet on tx ) p/w weakness, black stools and dark urine x 3-4 days, jaundice, and  worsening abdominal pain admitted for pain control and obstructive jaundice.     Plan:    # Pancreas cancer w/ hepatic mets/ Biliary obstruction:  - + Transaminitis  - CT showing peripancreatic adenopathy and hepatic metastases w/ hepatic   lesions increase in size and number as compared with the prior MRI of  08/25/2023  - S/p MRCP 8/25/23 showed hepatic lesions as large as 2-3 cm as well as pancreatic lesion 4-5 cm. EGD/EUS from 9/5/23 showed duodenal invasion with ulceration- bx consistent w/ pancreatic adenocarcinoma, CT C/A/P on this admission showing increased liver mets, intra,extrahepatic mild/moderate biliary dilatation 2/2 to pancreatic head mass w/ bilirubin elevations  - S/p EGD/ERCP 9/21, unable to stent, concern for bleeding, hemostatic gel placed in area of possible bleeding, celiac nerve plexus block performed 9/21  - C/w PPI  - Trend CBC's and LFT's  - Pain control  - Diet per GI rec's  - S/p billary drain 9/25  - Monitor drain output: strict outputs  - Patient agreeable to tx to [facility] for palliative RT - plan for 5 fraction RT with CT Stimulation and VSim the day prior  - GI and Heme/ Onc and IR following    # LE weakness and pain 2/2 + DVT:  - ct L spine with djd and foraminal narrowing ; no spinal stenosis   - MRI spine reviewed    - LE Doppler w/ + DVT  - C/w current meds/ Pain control  - C/w Lovenox sq  - Neurology following     # Hypercalcemia/ Hyponatremia:  - Mild hyperCa i/s/o malignancy  - Trend NA  - S/p IVF  - Avoid dehydration    # HTN/ HLD:  - Trend BP's  - C/w current meds    # Dm2:  - HgbA1c 7.7  - Continue to monitor blood glucose levels  - Sliding Scale  - Endo following    # Anxiety  - C/w current meds    # DVT ppx:  - IPC's  - Lovenox sq    [employer]  [phone]

## 2023-09-28 NOTE — PROGRESS NOTE ADULT - PROBLEM SELECTOR PROBLEM 1
Pancreas cancer
DM (diabetes mellitus)

## 2023-09-28 NOTE — PROGRESS NOTE ADULT - PROBLEM SELECTOR PLAN 3
Suggest to continue medications, monitoring, FU oncology/primary team recommendations.
- Oxycodone 5mg/10mg q4prn for  Mod/Sev pain  - Prn Morphine for intractable pain

## 2023-09-28 NOTE — PROGRESS NOTE ADULT - NUTRITIONAL ASSESSMENT
This patient has been assessed with a concern for Malnutrition and has been determined to have a diagnosis/diagnoses of Severe protein-calorie malnutrition.    This patient is being managed with:   Diet Consistent Carbohydrate/No Snacks-  No Beef  No Pork  No Poultry  Entered: Sep 25 2023  5:27PM  
This patient has been assessed with a concern for Malnutrition and has been determined to have a diagnosis/diagnoses of Severe protein-calorie malnutrition.    This patient is being managed with:   Diet Consistent Carbohydrate/No Snacks-  Entered: Sep 22 2023 11:04AM  
This patient has been assessed with a concern for Malnutrition and has been determined to have a diagnosis/diagnoses of Severe protein-calorie malnutrition.    This patient is being managed with:   Diet Clear Liquid-  Entered: Sep 21 2023  5:53PM  
This patient has been assessed with a concern for Malnutrition and has been determined to have a diagnosis/diagnoses of Severe protein-calorie malnutrition.    This patient is being managed with:   Diet Consistent Carbohydrate/No Snacks-  No Beef  No Pork  No Poultry  Entered: Sep 25 2023  5:27PM  
This patient has been assessed with a concern for Malnutrition and has been determined to have a diagnosis/diagnoses of Severe protein-calorie malnutrition.    This patient is being managed with:   Diet NPO after Midnight-     NPO Start Date: 24-Sep-2023   NPO Start Time: 23:59  Entered: Sep 24 2023 10:35AM    Diet Consistent Carbohydrate/No Snacks-  Entered: Sep 22 2023 11:04AM  
This patient has been assessed with a concern for Malnutrition and has been determined to have a diagnosis/diagnoses of Severe protein-calorie malnutrition.    This patient is being managed with:   Diet Consistent Carbohydrate/No Snacks-  No Beef  No Pork  No Poultry  Entered: Sep 25 2023  5:27PM  
This patient has been assessed with a concern for Malnutrition and has been determined to have a diagnosis/diagnoses of Severe protein-calorie malnutrition.    This patient is being managed with:   Diet Consistent Carbohydrate/No Snacks-  No Beef  No Pork  No Poultry  Entered: Sep 25 2023  5:27PM  
This patient has been assessed with a concern for Malnutrition and has been determined to have a diagnosis/diagnoses of Severe protein-calorie malnutrition.    This patient is being managed with:   Diet NPO after Midnight-     NPO Start Date: 24-Sep-2023   NPO Start Time: 23:59  Except Medications  Entered: Sep 24 2023 10:13PM    Diet Consistent Carbohydrate/No Snacks-  Entered: Sep 22 2023 11:04AM

## 2023-09-28 NOTE — PROGRESS NOTE ADULT - SUBJECTIVE AND OBJECTIVE BOX
Chief complaint  Patient is a 78y old  Female who presents with a chief complaint of black stool (28 Sep 2023 09:32)         Labs and Fingersticks  CAPILLARY BLOOD GLUCOSE      POCT Blood Glucose.: 137 mg/dL (28 Sep 2023 12:00)  POCT Blood Glucose.: 110 mg/dL (28 Sep 2023 09:09)  POCT Blood Glucose.: 119 mg/dL (27 Sep 2023 21:10)  POCT Blood Glucose.: 142 mg/dL (27 Sep 2023 16:20)      Anion Gap: 12 (09-27 @ 07:11)      Calcium: 11.8 *H* (09-27 @ 07:11)          09-27    132<L>  |  95<L>  |  16  ----------------------------<  102<H>  3.7   |  25  |  0.59    Ca    11.8<H>      27 Sep 2023 07:11                          10.5   14.91 )-----------( 325      ( 27 Sep 2023 07:14 )             31.4     Medications  MEDICATIONS  (STANDING):  atenolol  Tablet 50 milliGRAM(s) Oral daily  atorvastatin 40 milliGRAM(s) Oral at bedtime  BUpivacaine 0.25% (Preservative-Free) Injectable 4 milliLiter(s) Local Injection once  chlorhexidine 2% Cloths 1 Application(s) Topical daily  dextrose 5%. 1000 milliLiter(s) (50 mL/Hr) IV Continuous <Continuous>  dextrose 5%. 1000 milliLiter(s) (100 mL/Hr) IV Continuous <Continuous>  dextrose 50% Injectable 25 Gram(s) IV Push once  dextrose 50% Injectable 12.5 Gram(s) IV Push once  dextrose 50% Injectable 25 Gram(s) IV Push once  DULoxetine 20 milliGRAM(s) Oral daily  enoxaparin Injectable 40 milliGRAM(s) SubCutaneous every 24 hours  gabapentin 100 milliGRAM(s) Oral three times a day  glucagon  Injectable 1 milliGRAM(s) IntraMuscular once  indomethacin Suppository 100 milliGRAM(s) Rectal once  influenza  Vaccine (HIGH DOSE) 0.7 milliLiter(s) IntraMuscular once  insulin lispro (ADMELOG) corrective regimen sliding scale   SubCutaneous three times a day before meals  insulin lispro (ADMELOG) corrective regimen sliding scale   SubCutaneous at bedtime  multivitamin/minerals/iron Oral Solution (CENTRUM) 15 milliLiter(s) Oral daily  pantoprazole    Tablet 40 milliGRAM(s) Oral two times a day  polyethylene glycol 3350 17 Gram(s) Oral once  polyethylene glycol 3350 17 Gram(s) Oral daily  senna 2 Tablet(s) Oral at bedtime  sodium chloride 0.9%. 1000 milliLiter(s) (75 mL/Hr) IV Continuous <Continuous>  thiamine IVPB 500 milliGRAM(s) IV Intermittent daily      Physical Exam  General: Patient comfortable in bed  Vital Signs Last 12 Hrs  T(F): 98.4 (09-28-23 @ 09:13), Max: 98.4 (09-28-23 @ 09:13)  HR: 59 (09-28-23 @ 09:13) (59 - 79)  BP: 150/77 (09-28-23 @ 09:13) (129/79 - 150/77)  BP(mean): --  RR: 18 (09-28-23 @ 09:13) (18 - 18)  SpO2: 96% (09-28-23 @ 09:13) (96% - 96%)    CVS: S1S2   Respiratory: No wheezing, no crepitations  GI: Abdomen soft, bowel sounds positive  Musculoskeletal:  moves all extremities  : Voiding          Chief complaint    Patient is a 78y old  Female who presents with a chief complaint of black stool (28 Sep 2023 09:32)         Labs and Fingersticks  CAPILLARY BLOOD GLUCOSE      POCT Blood Glucose.: 137 mg/dL (28 Sep 2023 12:00)  POCT Blood Glucose.: 110 mg/dL (28 Sep 2023 09:09)  POCT Blood Glucose.: 119 mg/dL (27 Sep 2023 21:10)  POCT Blood Glucose.: 142 mg/dL (27 Sep 2023 16:20)      Anion Gap: 12 (09-27 @ 07:11)      Calcium: 11.8 *H* (09-27 @ 07:11)          09-27    132<L>  |  95<L>  |  16  ----------------------------<  102<H>  3.7   |  25  |  0.59    Ca    11.8<H>      27 Sep 2023 07:11                          10.5   14.91 )-----------( 325      ( 27 Sep 2023 07:14 )             31.4     Medications  MEDICATIONS  (STANDING):  atenolol  Tablet 50 milliGRAM(s) Oral daily  atorvastatin 40 milliGRAM(s) Oral at bedtime  BUpivacaine 0.25% (Preservative-Free) Injectable 4 milliLiter(s) Local Injection once  chlorhexidine 2% Cloths 1 Application(s) Topical daily  dextrose 5%. 1000 milliLiter(s) (50 mL/Hr) IV Continuous <Continuous>  dextrose 5%. 1000 milliLiter(s) (100 mL/Hr) IV Continuous <Continuous>  dextrose 50% Injectable 25 Gram(s) IV Push once  dextrose 50% Injectable 12.5 Gram(s) IV Push once  dextrose 50% Injectable 25 Gram(s) IV Push once  DULoxetine 20 milliGRAM(s) Oral daily  enoxaparin Injectable 40 milliGRAM(s) SubCutaneous every 24 hours  gabapentin 100 milliGRAM(s) Oral three times a day  glucagon  Injectable 1 milliGRAM(s) IntraMuscular once  indomethacin Suppository 100 milliGRAM(s) Rectal once  influenza  Vaccine (HIGH DOSE) 0.7 milliLiter(s) IntraMuscular once  insulin lispro (ADMELOG) corrective regimen sliding scale   SubCutaneous three times a day before meals  insulin lispro (ADMELOG) corrective regimen sliding scale   SubCutaneous at bedtime  multivitamin/minerals/iron Oral Solution (CENTRUM) 15 milliLiter(s) Oral daily  pantoprazole    Tablet 40 milliGRAM(s) Oral two times a day  polyethylene glycol 3350 17 Gram(s) Oral once  polyethylene glycol 3350 17 Gram(s) Oral daily  senna 2 Tablet(s) Oral at bedtime  sodium chloride 0.9%. 1000 milliLiter(s) (75 mL/Hr) IV Continuous <Continuous>  thiamine IVPB 500 milliGRAM(s) IV Intermittent daily      Physical Exam  General: Patient comfortable in bed  Vital Signs Last 12 Hrs  T(F): 98.4 (09-28-23 @ 09:13), Max: 98.4 (09-28-23 @ 09:13)  HR: 59 (09-28-23 @ 09:13) (59 - 79)  BP: 150/77 (09-28-23 @ 09:13) (129/79 - 150/77)  BP(mean): --  RR: 18 (09-28-23 @ 09:13) (18 - 18)  SpO2: 96% (09-28-23 @ 09:13) (96% - 96%)    CVS: S1S2   Respiratory: No wheezing, no crepitations  GI: Abdomen soft, bowel sounds positive  Musculoskeletal:  moves all extremities  : Voiding

## 2023-09-28 NOTE — PROGRESS NOTE ADULT - SUBJECTIVE AND OBJECTIVE BOX
Interventional Radiology Follow-Up Note.     Patient seen and examined     This is a 78y Female s/p internal/external biliary drainage on 9/25/23 in Interventional Radiology with Dr. Ibarra.     NO complaint offered.      Medication:   atenolol  Tablet: (09-28)  enoxaparin Injectable: (09-27)    Vitals:   T(F): 98.4, Max: 98.4 (09:13)  HR: 59  BP: 150/77  RR: 18  SpO2: 96%    Physical Exam:  General: Nontoxic, in NAD.  Abdomen: soft, NTND  Drain Device: Drain intact attached to gravity bag.  Flushed with 5cc NS w/o difficulty. Dressing clean, dry, intact.   24hr Drain output      Drain (mL): 340 mL  , bilious                LABS:  Na: 132 (09-27 @ 07:11), 131 (09-26 @ 06:54)  K: 3.7 (09-27 @ 07:11), 3.9 (09-26 @ 06:54)  Cl: 95 (09-27 @ 07:11), 95 (09-26 @ 06:54)  CO2: 25 (09-27 @ 07:11), 22 (09-26 @ 06:54)  BUN: 16 (09-27 @ 07:11), 13 (09-26 @ 06:54)  Cr: 0.59 (09-27 @ 07:11), 0.54 (09-26 @ 06:54)  Glu: 102(09-27 @ 07:11), 147(09-26 @ 06:54)    Hgb: 10.5 (09-27 @ 07:14), 10.6 (09-26 @ 07:08)  Hct: 31.4 (09-27 @ 07:14), 32.0 (09-26 @ 07:08)  WBC: 14.91 (09-27 @ 07:14), 18.42 (09-26 @ 07:08)  Plt: 325 (09-27 @ 07:14), 341 (09-26 @ 07:08)    INR:   PTT:           Bilirubin Total: 2.8 mg/dL *H* (09-26-23 @ 06:54)  Bilirubin Total: 5.5 mg/dL *H* (09-24-23 @ 07:07)          Assessment/Plan:  78y Female with Metastatic pancreatic adenomacarcinoma and obstructive jaundice with elevated bilirubin requesting IR biliary drain placement for optimization prior to chemo.  s/p internal/external biliary drainage on 9/25/23 in Interventional Radiology with Dr. Ibarra.        Maintain 1:1 fluid replacement with biliary output.    Please cap biliary drain if output is greater than 1Liter per day for 24hrs .    - Flush drain with 5cc NS daily forward only; DO NOT aspirate  - Change dressing q3 days or when dressing is saturated.  -  Monitor h/h; transfuse as needed  - Trend vs/labs  - Continue global management per primary team  - If the patient is d/c home with drainage catheter, pt can make an appointment with IR by calling the IR booking office at [phone]; recommend IR follow in 3 months for tube evaluation.  - Pt will benefit from VNS service to help with drainage catheter care; Pt should continue same drainage catheter care as an outpatient.   - Greater than 50% of the encounter was spent counseling and/ or coordination of care on drain care and follow up  Please call IR at [phone] or [phone] with any questions, concerns, or issues regarding above.      Also available on TEAMS

## 2023-09-28 NOTE — DISCHARGE NOTE NURSING/CASE MANAGEMENT/SOCIAL WORK - NSSCNAMETXT_GEN_ALL_CORE
negative [facility] Alert & oriented; no sensory, motor or coordination deficits, normal reflexes

## 2023-09-28 NOTE — PROGRESS NOTE ADULT - SUBJECTIVE AND OBJECTIVE BOX
78y.o. F h/o pancreatic cancer now s/p biliary drain 9/25- improved anemia from bleeding duodenal mass prior,  improvement in abdominal pain symptoms now- daughter was at bedside-  seen by rad onc for consult prior a few days ago.    She appears stable, NAD- [facility] 5 fractions of RT.   Her daughter tried to convince Ms. Thao to try to receive RT outpatient as was initially advised, and with her improving  over a transfer of care without medical necessity for inpatient RT- Ms. Thao was reluctant feeling unsure if she is "strong enough" for outpatient care at this time.     Hemoglobin: 10.5 g/dL (09.27.23 @ 07:14)    < from: CT Abdomen and Pelvis w/ IV Cont (09.16.23 @ 11:44) >  IMPRESSION:  Pancreatic head mass consistent with known pancreatic cancer with   associated peripancreatic adenopathy and hepatic metastases. The hepatic   lesions and increase in size and number as compared with the prior MRI of   08/25/2023.    Mild-to-moderate intra and extrahepatic biliary dilatation.    < from: IR Procedure (09.25.23 @ 10:15) >  Impression: Successful left-sided percutaneous external/internal biliary   drain placement with a 10.2 Cymro biliary drainage catheter.    Plan: Gravity drain. Forward flush only 10 cc normal saline once a day to   prevent clogging. Long-term management, including possible permanent CBD   stent, per GI and oncology services.

## 2023-09-30 PROBLEM — C25.9 MALIGNANT NEOPLASM OF PANCREAS, UNSPECIFIED: Chronic | Status: ACTIVE | Noted: 2023-01-01

## 2023-10-03 NOTE — ED ADULT TRIAGE NOTE - WEIGHT IN KG
Pt. Walked to the bathroom with stand by assist. Pt. Tolerated well.   Pt. Reports no dizziness with ambulation.   54.9

## 2023-10-03 NOTE — ED ADULT TRIAGE NOTE - CHIEF COMPLAINT QUOTE
Elevated calcium, low sodium, low potassium on lab work at [facility]. Currently starting treatment for pancreatic cancer.

## 2023-10-03 NOTE — CONSULT NOTE ADULT - ATTENDING COMMENTS
79 yo F w/ PMHx of stage IV pancreatic cancer C/B biliary obstruction s/p PTC placement, duodenal ulcer w/ bleeding secondary to pancreatic mass invasion was sent in from OP oncologist office (Dr. Kelly Velázquez) for hypercalcemia (Ca 17) in the setting of a few days of fatigue, decreased p.o. intake, abdominal pain, and confusion.     Hypercalcemia- secondary to malignancy     10/3 night- received 4 IU/kg of Calcitonin and a total of 3 L of iv fluids   per daughter- she is definitely more interactive today but still not making too much sense- which is different from her baseline   last calcium 14- corrected calcium 14.8     - continue LR at 125 cc/hr   - repeat 1 dose of Calcitonin 4 IU/kg.   - Received denosumab on 10/3.   - check PTH, 25-hydroxy Vitamin D, 1,25 di-hydroxy Vitamin D, SPEP, Serum free light chain assay.     jaqueline worley  nephrology attending   please contact me on TEAMS   Office- [phone]

## 2023-10-03 NOTE — ED PROVIDER NOTE - CLINICAL SUMMARY MEDICAL DECISION MAKING FREE TEXT BOX
77 yo F w/ PMHx of stage IV pancreatic cancer C/B biliary obstruction s/p PTC placement, duodenal ulcer w/ bleeding secondary to pancreatic mass invasion was sent in from OP oncologist office (Dr. Kelly Velázquez) for hypercalcemia (Ca 17) in the setting of a few days of fatigue, decreased p.o. intake, abdominal pain, and confusion.  Vital signs remarkable for /78.  Physical exam is remarkable for fatigue and left-sided biliary drain.  EKG shows normal sinus rhythm with nonspecific T wave abnormalities (V2–V3).  Concern for moderate–severe hypercalcemia associated with altered mental status and abdominal pain.  Plan for IV fluids, calcitonin per nephrology recs, and basic labs.  Dispo likely admission for further treatment

## 2023-10-03 NOTE — ED PROVIDER NOTE - ATTENDING CONTRIBUTION TO CARE
Attending MD Tristan: I personally have seen and examined this patient.  Resident note reviewed and agree on plan of care and except where noted.  See below for details.     seen in [facility]    78F with PMH/PSH including stage IV pancreatic cancer complicated by biliary obstruction s/p PTC placement, duodenal ulcer w/ bleeding secondary to pancreatic mass invasion presents to the ED sent in by Onc Dr. Kelly Velázquez for hypercalcemia and fatigue.  Ca 16, received 1L NS yesterday and additional hydration today as well as Xgeva 120mg.  Repeat Ca 17.  Patient reports difficulty concentrating, confusion, fatigue.  Reports decreased po intake.  Reports has chronic abdominal pain, denies worsened.  Denies chest pain, shortness of breath.      Exam:   General: NAD, AAOx3 (but took a very long time to come up with "hospital" and "2023")  HENT: head NCAT, airway patent  Eyes: anicteric, no conjunctival injection   Lungs: lungs CTAB with good inspiratory effort, no wheezing, no rhonchi, no rales  Cardiac: +S1S2, no obvious m/r/g  GI: abdomen soft with +BS, +L abdominal biliary drain, NT, ND  : no CVAT  MSK: ranging neck and extremities freely  Neuro: moving all extremities spontaneously, nonfocal  Psych: normal mood and affect    A/P: 78F with hypercalcemia, will obtain labs for metabolic derangement, will start IVFs assuming patient persistently hypercalcemia, will obtain EKG, CXR, as per Heme/Onc note will give Calcitonin and consult nephro, patient to be admitted

## 2023-10-03 NOTE — ED PROVIDER NOTE - PROGRESS NOTE DETAILS
Tan PGY2: Nephrology will arrive in the next hour to evaluate patient in-person. Will start calcitonin 4U/kg and an additional liter of LR per nephrology recs. Attending MD Tristan: Spoke with Dr. Canada of Nephrology.  Recommend completing 3L of LR.  After this is completed, patient to be started on LR 125mL/hr.  Patient just received Calcitonin, should have calcium checked at midnight.  If calcium is not improved or has uptrended, may need dialysis.  CXR reviewed, will give abx.  Will admit. Tan PGY2: Spoke with nephrology.  Requesting repeat BMP at 11 PM to reevaluate dosage for calcitonin.  Requesting an additional 1 L bolus of LR (3 x fluid boluses) followed by maintenance fluid at 125 mils per hour.  Calcitonin will be dosed at 4 units/kg every 12 hours and may be increased based on repeat BMP at 11 PM.  Started ceftriaxone/azithromycin for community-acquired pneumonia due to chest x-ray findings of right-sided consolidation.  Due to low potassium (3.2) repleting 40 mEq potassium chloride extended release tablet -discussed with nephrology.  CT head negative.      Patient admitted to medicine with nephrology following.  No need for telemetry per hospitalist attending.  No EKG changes requiring close follow-up. Tan PGY2: Spoke with nephrology fellow regarding repeat BMP.  Labs show improving hypercalcemia, but persistent hyponatremia and hypokalemia.  Discussed thatatient should be receiving  mL/hr instead of D5 as patient is hyponatremic.  Based on repeat BMP, calcitonin 4 units/KG regimen every 12 hours is appropriate regimen.  Patient should also receive additional IV potassium supplementation as patient did not receive 40 mEq extended release tablet in the ED.    Communicated updates to inpatient resident and attending.  Attending prefers to give patient normal saline maintenance fluid as LR has calcium, but otherwise agrees with plan.

## 2023-10-03 NOTE — ED PROVIDER NOTE - PHYSICAL EXAMINATION
GENERAL: fatigue, mesomorphic body habitus  HEENT: atraumatic, normocephalic, vision grossly intact, EOMI, no conjunctivitis or discharge, hearing grossly intact, no nasal discharge or epistaxis, clear pharynx  CV: regular rate, normal rhythm, normal S1/S2, no murmurs/rubs, no cyanosis  PULM: normal work of breathing, clear breath sounds in b/l upper/lower lung fields, no crackles/rales/rhonchi/wheezing  GI: L sided biliary drain, soft/non-tender/nondistended abdomen, no guarding or rebound tenderness, no palpable masses  NEURO: A&Ox2, delayed speech, follows commands, no focal motor or sensory deficits  MSK: no joint tenderness/swelling/erythema, ranging all extremities with no appreciable loss of ROM  EXT: no peripheral edema, no calf tenderness, no redness or swelling  SKIN: warm, dry, and intact, no rashes  PSYCH: appropriate mood and affect

## 2023-10-03 NOTE — CHART NOTE - NSCHARTNOTEFT_GEN_A_CORE
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 Pt presented for f/u to my office yesterday with significant fatigue, poor po intake and abdominal pain. Received 1 L of NS in the office and labs resulted later in the evening with Ca 16. Today pt returned for further hydration and received Xgeva 120 mg x1. Repeat labs with Ca 17. Pt AMS, lethargic at times, very weak. Discussed with family and pt herself that safest plan is to be evaluated in the hospital. Of note, pt was pending starting palliative RT today to the pancreatic mass to prevent further GIB. Currently RT is on hold.     Recommendation   - Calcitonin   - renal consult   - s/p Xgeva which works robustly to decrease Calcium but results may not be apparent till tomorrow/the next day   - GOC discussion ongoing with pt and family. Current goals of care are to treat all reversible issues and work towards stabilizing pt in order to receive palliative chemotherapy. I did discuss that this may not be possible with son and  but right now they are not yet ready to agree to hospice.   - admit to medicine, hospitalist  - house onc to follow

## 2023-10-03 NOTE — ED PROVIDER NOTE - OBJECTIVE STATEMENT
79 yo F w/ PMHx of stage IV pancreatic cancer C/B biliary obstruction s/p PTC placement, duodenal ulcer w/ bleeding secondary to pancreatic mass invasion was sent in from OP oncologist office (Dr. Kelly Velázquez) for hypercalcemia (Ca 17) in the setting of a few days of fatigue, decreased p.o. intake, abdominal pain, and confusion.  On chart review, patient received Xgeva (Denosumab, 3-day onset of action, 10-day peak onset) yesterday (10/3).  She is also been getting IV fluids with minimal improvement in calcium levels.  She endorses confusion/difficulty concentrating, fatigue, decreased p.o. intake, and nonspecific abdominal pain.  She denies any fever/chills/cough/sore throat, CP, SOB,  symptoms, and recent trauma.  Of note, chemotherapy was not started patient supposed to get radiotherapy soon.  also reports that patient's biliary drain tubing was nicked with temporary fix in place.  PCP is Dr. Taylor. 79 yo F w/ PMHx of stage IV pancreatic cancer C/B biliary obstruction s/p PTC placement, duodenal ulcer w/ bleeding secondary to pancreatic mass invasion was sent in from OP oncologist office (Dr. Kelly Velázquez) for hypercalcemia (Ca 17) in the setting of a few days of fatigue, decreased p.o. intake, abdominal pain, and confusion.  On chart review, patient received Xgeva (Denosumab, 3-day onset of action, 10-day peak onset) today (10/3).  She is also been getting IV fluids with minimal improvement in calcium levels.  She endorses confusion/difficulty concentrating, fatigue, decreased p.o. intake, and nonspecific abdominal pain.  She denies any fever/chills/cough/sore throat, CP, SOB,  symptoms, and recent trauma.  Of note, chemotherapy was not started patient supposed to get radiotherapy soon.  also reports that patient's biliary drain tubing was nicked with temporary fix in place.  PCP is Dr. Taylor.

## 2023-10-03 NOTE — CONSULT NOTE ADULT - ASSESSMENT
79 yo F w/ PMHx of stage IV pancreatic cancer C/B biliary obstruction s/p PTC placement, duodenal ulcer w/ bleeding secondary to pancreatic mass invasion pw symptomatic severe hypercalcemia (Ca 17).

## 2023-10-03 NOTE — CONSULT NOTE ADULT - SUBJECTIVE AND OBJECTIVE BOX
[facility] DIVISION OF KIDNEY DISEASES AND HYPERTENSION -- [phone]  -- INITIAL CONSULT NOTE  --------------------------------------------------------------------------------  HPI:  77 yo F w/ PMHx of stage IV pancreatic cancer C/B biliary obstruction s/p PTC placement, duodenal ulcer w/ bleeding secondary to pancreatic mass invasion was sent in from OP oncologist office (Dr. Kelly Velázquez) for hypercalcemia (Ca 17) in the setting of a few days of fatigue, decreased p.o. intake, abdominal pain, and confusion.  On chart review, patient received Xgeva today (10/3).  She is also been getting IV fluids with minimal improvement in calcium levels.  She endorses confusion/difficulty concentrating, fatigue, decreased p.o. intake, and nonspecific abdominal pain.  She denies any fever/chills/cough/sore throat, CP, SOB,  symptoms, and recent trauma.  Of note, chemotherapy was not started patient supposed to get radiotherapy soon.   Labs showed severe hypercalcemia, Nephrology consulted for recs.       PAST HISTORY  --------------------------------------------------------------------------------  PAST MEDICAL & SURGICAL HISTORY:  Insomnia      Pancreas cancer      Fx Wrist left  orif 2006      S/P Bunionectomy bilateral        FAMILY HISTORY:  Family history of myelodysplasia (Mother)    Family history of aortic stenosis (Mother)    Family history of stomach cancer (Sibling)      PAST SOCIAL HISTORY:    ALLERGIES & MEDICATIONS  --------------------------------------------------------------------------------  Allergies    No Known Allergies    Intolerances      Standing Inpatient Medications  azithromycin  IVPB 500 milliGRAM(s) IV Intermittent once  cefTRIAXone   IVPB 1000 milliGRAM(s) IV Intermittent once  potassium chloride    Tablet ER 40 milliEquivalent(s) Oral once    PRN Inpatient Medications      REVIEW OF SYSTEMS  --------------------------------------------------------------------------------  as per hpi    All other systems were reviewed and are negative, except as noted.    VITALS/PHYSICAL EXAM  --------------------------------------------------------------------------------  T(C): 36.3 (10-03-23 @ 18:53), Max: 36.9 (10-03-23 @ 16:01)  HR: 63 (10-03-23 @ 18:53) (58 - 65)  BP: 157/76 (10-03-23 @ 18:53) (131/75 - 167/66)  RR: 18 (10-03-23 @ 18:53) (18 - 18)  SpO2: 96% (10-03-23 @ 18:53) (95% - 99%)  Wt(kg): --  Height (cm): 157.5 (10-03-23 @ 18:53)  Weight (kg): 54.9 (10-03-23 @ 18:53)  BMI (kg/m2): 22.1 (10-03-23 @ 18:53)  BSA (m2): 1.54 (10-03-23 @ 18:53)        Physical Exam:  	Gen: NAD, lethargic  	HEENT: Anicteric, dry mucus membranes   	Pulm: CTA B/L  	CV: S1S2+  	Abd: Soft, +BS    	Ext: No LE edema B/L  	Neuro: Awake, A&O x1, no FND  	Skin: Warm and dry    LABS/STUDIES  --------------------------------------------------------------------------------              9.8    21.12 >-----------<  218      [10-03-23 @ 20:04]              29.9     134  |  97  |  27  ----------------------------<  134      [10-03-23 @ 20:04]  3.2   |  28  |  0.85        Ca     15.8     [10-03-23 @ 20:04]      iCa    2.32     [10-03 @ 20:06]    TPro  6.3  /  Alb  3.1  /  TBili  2.0  /  DBili  x   /  AST  47  /  ALT  75  /  AlkPhos  259  [10-03-23 @ 20:04]    PT/INR: PT 15.3 , INR 1.40       [10-03-23 @ 21:13]  PTT: 25.5       [10-03-23 @ 21:13]      Creatinine Trend:  SCr 0.85 [10-03 @ 20:04]  SCr 0.96 [10-03 @ 16:18]  SCr 0.78 [10-02 @ 11:49]  SCr 0.59 [09-27 @ 07:11]  SCr 0.54 [09-26 @ 06:54]    Urinalysis - [10-03-23 @ 20:04]      Color  / Appearance  / SG  / pH       Gluc 134 / Ketone   / Bili  / Urobili        Blood  / Protein  / Leuk Est  / Nitrite       RBC  / WBC  / Hyaline  / Gran  / Sq Epi  / Non Sq Epi  / Bacteria           Tacrolimus  Cyclosporine  Sirolimus  Mycophenolate  BK PCR  CMV PCR  Parvo PCR  EBV PCR

## 2023-10-04 NOTE — CONSULT NOTE ADULT - ASSESSMENT
78y F newly dx'd pancreatic CA (not  yet on tx ) p/w weakness, black stools and dark urine x 3-4 days, jaundice, and  worsening abdominal pain admitted for pain control and obstructive jaundice. Palliative care consulted for GOC and assistance with symptom management

## 2023-10-04 NOTE — CONSULT NOTE ADULT - PROBLEM SELECTOR RECOMMENDATION 5
will continue to follow for GOC/symptoms  Can be reached by TEAMS M-F 9-5 Lottie Sanchez Any other time please page [phone] if needed

## 2023-10-04 NOTE — CONSULT NOTE ADULT - CONVERSATION DETAILS
Met with patient's son Emerson. He shared in his family is his mom, dad, and sister. He identified dad, Marlo as the surrogate decision maker. Discussed that patient was just diagnosed 4 weeks ago and feels that there are multiple set backs and they have been unable to start treatment. Originally he shared mom's goals were to be full code and pursue treatment options. Emerson shared his concern is, if she is unable to improve her quality of life to what extent they put her through treatment options. He shared his hopefulness for her mental status to recover so she can be part of the conversation regarding next steps. He would like to see if the Calcium can come down and see if she can make any improvements before making decisions. Encouraged Emerson to speak with his father and sister about the "what ifs" and I will be there to help support them through those conversations. Emerson was appreciative of the visit.

## 2023-10-04 NOTE — ED ADULT NURSE NOTE - CHIEF COMPLAINT QUOTE
Elevated calcium, low sodium, low potassium on lab work at [facility]. Currently starting treatment for pancreatic cancer.

## 2023-10-04 NOTE — H&P ADULT - NSHPPHYSICALEXAM_GEN_ALL_CORE
PHYSICAL EXAM:  Vital Signs Last 24 Hrs  T(C): 36.6 (04 Oct 2023 00:25), Max: 36.9 (03 Oct 2023 16:01)  T(F): 97.8 (04 Oct 2023 00:25), Max: 98.4 (03 Oct 2023 16:01)  HR: 70 (04 Oct 2023 00:25) (58 - 70)  BP: 183/82 (04 Oct 2023 00:25) (131/75 - 183/82)  BP(mean): --  RR: 18 (04 Oct 2023 00:25) (16 - 18)  SpO2: 93% (04 Oct 2023 00:25) (93% - 99%)    Parameters below as of 04 Oct 2023 00:25  Patient On (Oxygen Delivery Method): room air    GENERAL: NAD, well-groomed, well-developed  HEAD:  Atraumatic, Normocephalic  EYES: EOMI, PERRLA, conjunctiva and sclera clear  ENMT: No tonsillar erythema, exudates, or enlargement; Moist mucous membranes  NECK: Supple, No JVD, Normal thyroid  HEART: Regular rate and rhythm; No murmurs, rubs, or gallops  RESPIRATORY: CTA B/L, No W/R/R  ABDOMEN: Soft, Nontender, Nondistended; Bowel sounds present  NEUROLOGY: A&Ox3, nonfocal, moving all extremities  EXTREMITIES:  2+ Peripheral Pulses, No clubbing, cyanosis, or edema  SKIN: warm, dry, normal color, no rash or abnormal lesions PHYSICAL EXAM:  Vital Signs Last 24 Hrs  T(C): 36.6 (04 Oct 2023 00:25), Max: 36.9 (03 Oct 2023 16:01)  T(F): 97.8 (04 Oct 2023 00:25), Max: 98.4 (03 Oct 2023 16:01)  HR: 70 (04 Oct 2023 00:25) (58 - 70)  BP: 183/82 (04 Oct 2023 00:25) (131/75 - 183/82)  BP(mean): --  RR: 18 (04 Oct 2023 00:25) (16 - 18)  SpO2: 93% (04 Oct 2023 00:25) (93% - 99%)    Parameters below as of 04 Oct 2023 00:25  Patient On (Oxygen Delivery Method): room air    GENERAL: NAD, frail appearing, lethargic  HEAD:  Atraumatic, Normocephalic  EYES: EOMI, conjunctiva and sclera clear  ENMT: No tonsillar erythema, exudates, or enlargement; Moist mucous membranes  NECK: Supple, No JVD, Normal thyroid  HEART: Regular rate and rhythm; No murmurs, rubs, or gallops  RESPIRATORY: CTA B/L, No W/R/R  ABDOMEN: + L abd drain with serous output. mild discomfort on palpation. Otherwise Soft Nondistended  NEUROLOGY: A&Ox3, nonfocal, moving all extremities  EXTREMITIES:  2+ Peripheral Pulses, No clubbing, cyanosis, or edema  SKIN: warm, dry, normal color, no rash or abnormal lesions

## 2023-10-04 NOTE — CONSULT NOTE ADULT - SUBJECTIVE AND OBJECTIVE BOX
Date of Service: 10-04-23 @ 13:39    HPI: 78y F newly dx'd pancreatic CA (not  yet on tx ) p/w weakness, black stools and dark urine x 3-4 days, jaundice, and  worsening abdominal pain admitted for pain control and obstructive jaundice. Palliative care consulted for GOC and assistance with symptom management         PERTINENT PM/SXH:   No pertinent past medical history    No pertinent past medical history    Insomnia    Pancreas cancer      Fx Wrist left    S/P Bunionectomy bilateral      FAMILY HISTORY:  Family history of myelodysplasia (Mother)    Family history of aortic stenosis (Mother)    Family history of stomach cancer (Sibling)        ITEMS NOT CHECKED ARE NOT PRESENT    SOCIAL HISTORY:   Significant other/partner[x ]  Children[x ]  Christianity/Spirituality:  Substance hx:  [ ]   Tobacco hx:  [ ]   Alcohol hx: [ ]   Home Opioid hx:  [ x] I-Stop Reference No: #: [id number]  Living Situation: [ x]Home  [ ]Long term care  [ ]Rehab [ ]Other    ADVANCE DIRECTIVES:    DNR/MOLST  [ ]  Living Will  [ ]   DECISION MAKER(s):  [ ] Health Care Proxy(s)  [ x] Surrogate(s)  [ ] Guardian           Name(s): Phone Number(s): Marlo Thao    BASELINE (I)ADL(s) (prior to admission):  [place]: [ x]Total  [ ] Moderate [ ]Dependent    Allergies    No Known Allergies    Intolerances    MEDICATIONS  (STANDING):  atenolol  Tablet 25 milliGRAM(s) Oral daily  dextrose 5%. 1000 milliLiter(s) (100 mL/Hr) IV Continuous <Continuous>  dextrose 5%. 1000 milliLiter(s) (50 mL/Hr) IV Continuous <Continuous>  dextrose 50% Injectable 25 Gram(s) IV Push once  dextrose 50% Injectable 25 Gram(s) IV Push once  dextrose 50% Injectable 12.5 Gram(s) IV Push once  DULoxetine 20 milliGRAM(s) Oral daily  gabapentin 100 milliGRAM(s) Oral three times a day  glucagon  Injectable 1 milliGRAM(s) IntraMuscular once  insulin lispro (ADMELOG) corrective regimen sliding scale   SubCutaneous three times a day before meals  insulin lispro (ADMELOG) corrective regimen sliding scale   SubCutaneous at bedtime  lactated ringers. 500 milliLiter(s) (125 mL/Hr) IV Continuous <Continuous>  pantoprazole    Tablet 40 milliGRAM(s) Oral two times a day  potassium phosphate IVPB 15 milliMole(s) IV Intermittent once  senna 2 Tablet(s) Oral at bedtime    MEDICATIONS  (PRN):  dextrose Oral Gel 15 Gram(s) Oral once PRN Blood Glucose LESS THAN 70 milliGRAM(s)/deciliter  oxyCODONE    IR 2.5 milliGRAM(s) Oral every 4 hours PRN Moderate Pain (4 - 6)  oxyCODONE    IR 5 milliGRAM(s) Oral every 4 hours PRN Severe Pain (7 - 10)  polyethylene glycol 3350 17 Gram(s) Oral daily PRN Constipation    PRESENT SYMPTOMS: [ ]Unable to self-report see CPOT, PAINADs, RDOS  Source if other than patient:  [ ]Family   [ ]Team     Pain: [ x]yes [ ]no  QOL impact -   Location -    Abdominal pain                 Aggravating factors -  Quality - Stabbing   Radiation -  Timing-  Severity (0-10 scale): 10  Minimal acceptable level (0-10 scale): 3      Dyspnea:                           [ ]Mild [ ]Moderate [ ]Severe  Anxiety:                             [ ]Mild [ ]Moderate [ ]Severe  Fatigue:                             [ ]Mild [ x]Moderate [ ]Severe  Nausea:                             [ ]Mild [ ]Moderate [ ]Severe  Loss of appetite:              [ ]Mild [ ]Moderate [ ]Severe  Constipation:                    [ ]Mild [ ]Moderate [ ]Severe    PCSSQ [Palliative Care Spiritual Screening Question]   Severity (0-10):  Score of 4 or > indicate consideration of Chaplaincy referral.  Chaplaincy Referral: [ ] yes [x ] refused [ ] following    Caregiver Mallory? : [ ] yes [ ] no [x ] deferred:  Social work referral [ ] Patient & Family Centered Care Referral [ ]     Anticipatory Grief Present?: [ ] yes [ ] no  [ x] deferred: Palliative Social work referral [ ]  Patient & Family Centered Care Referral [ ]       Other Symptoms:  [ x]All other review of systems negative   [ ] Unable to obtain due to poor mentation    PHYSICAL EXAM:  Vital Signs Last 24 Hrs  T(C): 36.7 (04 Oct 2023 12:05), Max: 36.9 (03 Oct 2023 16:01)  T(F): 98 (04 Oct 2023 12:05), Max: 98.4 (03 Oct 2023 16:01)  HR: 68 (04 Oct 2023 12:05) (57 - 72)  BP: 166/78 (04 Oct 2023 12:05) (131/75 - 183/82)  BP(mean): --  RR: 17 (04 Oct 2023 12:05) (16 - 18)  SpO2: 97% (04 Oct 2023 12:05) (93% - 99%)    Parameters below as of 04 Oct 2023 12:05  Patient On (Oxygen Delivery Method): room air     I&O's Summary    03 Oct 2023 07:01  -  04 Oct 2023 07:00  --------------------------------------------------------  IN: 0 mL / OUT: 300 mL / NET: -300 mL    04 Oct 2023 07:01  -  04 Oct 2023 13:39  --------------------------------------------------------  IN: 240 mL / OUT: 0 mL / NET: 240 mL        GENERAL:  [ ]Alert  [x]Oriented x 2  [x ]Lethargic  [ ]Cachexia  [ ]Unarousable  [x]Verbal  [ ]Non-Verbal  Behavioral:   [ ]Anxiety  [ ]Delirium [ ]Agitation [ ]Other  HEENT:  [x]Normal   [ ]Dry mouth   [ ]ET Tube/Trach  [ ]Oral lesions  PULMONARY:   [ ]Clear [ ]Tachypnea  [ ]Audible excessive secretions   [ ]Rhonchi        [ ]Right [ ]Left [ ]Bilateral  [ ]Crackles        [ ]Right [ ]Left [ ]Bilateral  [ ]Wheezing     [ ]Right [ ]Left [ ]Bilateral  [x ]Diminished BS [ ] Right [ ]Left [ ]Bilateral  CARDIOVASCULAR:    [x]Regular [ ]Irregular [ ]Tachy  [ ]Da [ ]Murmur [ ]Other  GASTROINTESTINAL:  [x]Soft  [ ]Distended   [x]+BS  [ ]Non tender [x ]Tender  [ ]PEG [ ]OGT/ NGT   Last BM:    GENITOURINARY:  [x]Normal [ ]Incontinent   [ ]Oliguria/Anuria   [ ]Miller  MUSCULOSKELETAL:   [ ]Normal   [x]Weakness  [ ]Bed/Wheelchair bound [ ]Edema  NEUROLOGIC:   [ ]No focal deficits  [ ] Cognitive impairment  [ ] Dysphagia [ ]Dysarthria [ ] Paresis [ ]Other   SKIN:   [x]Normal  [ ]Rash   [ ]Pressure ulcer(s) [ ]y [ ]n present on admission    CRITICAL CARE:  [ ] Shock Present  [ ]Septic [ ]Cardiogenic [ ]Neurologic [ ]Hypovolemic  [ ]  Vasopressors [ ]  Inotropes   [ ]Respiratory failure present [ ]Mechanical ventilation [ ]Non-invasive ventilatory support [ ]High flow    [ ]Acute  [ ]Chronic [ ]Hypoxic  [ ]Hypercarbic [ ]Other  [ ]Other organ failure     LABS:                        10.0   22.07 )-----------( 277      ( 04 Oct 2023 07:02 )             29.6   10-04    136  |  99  |  22  ----------------------------<  106<H>  3.2<L>   |  27  |  0.74    Ca    13.1<HH>      04 Oct 2023 12:55  Phos  1.9     10-04  Mg     1.5     10-04    TPro  6.2  /  Alb  3.0<L>  /  TBili  2.0<H>  /  DBili  x   /  AST  48<H>  /  ALT  73<H>  /  AlkPhos  271<H>  10-04  PT/INR - ( 03 Oct 2023 21:13 )   PT: 15.3 sec;   INR: 1.40 ratio         PTT - ( 03 Oct 2023 21:13 )  PTT:25.5 sec    Urinalysis Basic - ( 04 Oct 2023 12:55 )    Color: x / Appearance: x / SG: x / pH: x  Gluc: 106 mg/dL / Ketone: x  / Bili: x / Urobili: x   Blood: x / Protein: x / Nitrite: x   Leuk Esterase: x / RBC: x / WBC x   Sq Epi: x / Non Sq Epi: x / Bacteria: x      RADIOLOGY & ADDITIONAL STUDIES:  < from: CT Chest No Cont (10.04.23 @ 11:02) >  ACC: [id number] EXAM:  CT CHEST   ORDERED BY:  DANA FRANCO     PROCEDURE DATE:  10/04/2023          INTERPRETATION:  HISTORY: Right lower lobe opacity on chest radiograph   dated 10/3/2023. Rule out pneumonia.    EXAMINATION: CT CHEST was performed without IV contrast.    COMPARISON: 9/16/2023 CT chest.    FINDINGS:    AIRWAYS, LUNGS, PLEURA: Clear central airways. Linear right lower lobe   subsegmental atelectasis. No focal consolidation. Left upper lobe 0.4 cm   nodule (image 28, series 2) is new. No pleural effusion.    MEDIASTINUM: Normal heart size. No pericardial effusion. Thoracic aorta   normal caliber.  No large mediastinal lymph nodes.    IMAGED ABDOMEN: Many hepatic hypodense lesions are identified, but are   not well assessed without contrast. With respect to 9/16/2023, lesions   appear to be new and increased in size. Unchanged left adrenal lesion.   Biliary stent in place. Pancreatic head mass partially imaged. Unchanged   splenic lesion.    SOFT TISSUES: Unremarkable.    BONES: Unremarkable.      IMPRESSION:.    No evidence of pneumonia.    Left apical 0.4 cm nodule is new compared to 9/16/2023; indeterminate,   but could represent metastasis.    Hepatic lesion/metastasis appear progressed in size compared to 9/16/2023.    --- End of Report ---             CADY CURRAN MD; Attending Radiologist  This document has been electronically signed. Oct  4 2023 11:13AM    < end of copied text >    PROTEIN CALORIE MALNUTRITION PRESENT: [ ]mild [ ]moderate [ ]severe [ ]underweight [ ]morbid obesity  https://www.andeal.org/vault/2440/web/files/ONC/Table_Clinical%20Characteristics%20to%20Document%20Malnutrition-White%20JV%20et%20al%202012.pdf    Height (cm): 157.5 (10-03-23 @ 18:53), 157.5 (09-25-23 @ 08:45), 157.5 (09-05-23 @ 14:10)  Weight (kg): 54.9 (10-03-23 @ 18:53), 59 (09-29-23 @ 15:04), 56.7 (09-25-23 @ 08:45)  BMI (kg/m2): 22.1 (10-03-23 @ 18:53), 23.8 (09-29-23 @ 15:04), 22.9 (09-25-23 @ 08:45)    [ ]PPSV2 < or = to 30% [ ]significant weight loss  [ ]poor nutritional intake  [ ]anasarca[ ]Artificial Nutrition      Other REFERRALS:  [ ]Hospice  [ ]Child Life  [ ]Social Work  [ ]Case management [ ]Holistic Therapy     Care Coordination Assessment 201 [C. Provider] (09-22-23 @ 15:39)      Palliative Performance Scale:  http://npcrc.org/files/news/palliative_performance_scale_ppsv2.pdf  (Ctrl +  left click to view)  Respiratory Distress Observation Tool:  https://homecareinformation.net/handouts/hen/Respiratory_Distress_Observation_Scale.pdf (Ctrl +  left click to view)  PAINAD Score:  http://geriatrictoolkit.[place].[facility]/cog/painad.pdf (Ctrl +  left click to view)

## 2023-10-04 NOTE — H&P ADULT - PROBLEM SELECTOR PLAN 2
- c/w CTX azithro  - f/u BCx UA SputumCx CXR w RLL opacity possible PNA. WBC elevated 21 from prior 10s.     - c/w ctx azithro (10/3-)  - f/u BCx UA SputumCx Legionella CXR w RLL opacity possible PNA. WBC elevated 21 from prior 10s.   s/p ctx azithro (10/3-)    - low suspicion for PNA, will monitor off abx  - f/u procal  - consider CT chest non con if concerned  - f/u BCx UA SputumCx Legionella CXR w RLL opacity possible PNA. WBC elevated 21 from prior 10s.   s/p ctx azithro x1 dose in ED    - low suspicion for PNA given lack of clinical symptoms, will monitor off abx for now  - f/u procal  - f/u CT chest non con for further assess of RLL opacity  - f/u BCx UA SputumCx Legionella

## 2023-10-04 NOTE — CHART NOTE - NSCHARTNOTEFT_GEN_A_CORE
This report was requested by: Jeremi Stephenson | Reference #: [id number]    Practitioner Count: 3  Pharmacy Count: 2  Current Opioid Prescriptions: 2  Current Benzodiazepine Prescriptions: 0  Current Stimulant Prescriptions: 0      Patient Demographic Information (PDI)       PDI	First Name	Last Name	Birth Date	Gender	Street Address	[facility] Code  SAM Thao	1945	Female	[address] [place]	[address]    Prescription Information      PDI Filter:    PDI	Current Rx	Drug Type	Rx Written	Rx Dispensed	Drug	Quantity	Days Supply	Prescriber Name	Prescriber MARE #	Payment Method	Dispenser  A	Y	O	10/02/2023	10/02/2023	methadone hcl 5 mg tablet	30	15	Kelly Velázquez	[id number]	Medicare	[facility]  A	Y	O	10/02/2023	10/02/2023	oxycodone hcl (ir) 5 mg tablet	60	10	Kelly Velázquez	[id number]	Medicare	[facility]  A	N	O	09/14/2023	09/14/2023	oxycodone hcl (ir) 5 mg tablet	42	7	Hedjar, Aryles	[id number]	Medicare	[facility]  A	N	B	08/04/2023	08/09/2023	lorazepam 1 mg tablet	60	30	Gordon Wiley MD	[id number]	[facility]  A	N	B	06/01/2023	06/04/2023	lorazepam 1 mg tablet	60	30	Gordon Wiley MD	[id number]	[facility]  A	N	B	04/03/2023	04/04/2023	lorazepam 1 mg tablet	60	15	Gordon Wiley MD	[id number]	[facility]  A	N	B	03/14/2023	03/14/2023	alprazolam 0.5 mg tablet	1	1	Emi Taylor	[id number]	[facility]  A	N	B	02/01/2023	02/02/2023	lorazepam 1 mg tablet	60	15	Gordon Wiley MD	[id number]	[facility]  A	N	B	11/21/2022	11/23/2022	temazepam 30 mg capsule	30	30	Gordon Wiley MD	[id number]	Medicare	[facility]  A	N	B	10/25/2022	10/25/2022	temazepam 30 mg capsule	30	30	Nia Gonzalez MD	[id number]	Medicare	[facility]    * - Details of Drug Type : O = Opioid, B = Benzodiazepine, S = Stimulant    * - Drugs marked with an asterisk are compound drugs. If the compound drug is made up of more than one controlled substance, then each controlled substance will be a separate row in the table.

## 2023-10-04 NOTE — PROVIDER CONTACT NOTE (MEDICATION) - ASSESSMENT
Pt AOx3, denies any pain SOB or distress currently. Pt unable to tolerate PO AM meds- ordered for gabapentin and protonix. pt attempted to take them but spit them out.
Pt asymptomatic. Denies distress or discomfort.

## 2023-10-04 NOTE — ED ADULT NURSE NOTE - OBJECTIVE STATEMENT
78y female presents to the ed aaox2-3 with complaints of abd lab results. PMH Pancreatic ca IV. Pt was at oncologist office, noted to have elvated CA of 17. Pt abd is soft, nontender, nondistended. Pt Denies headache, dizziness, vision changes, chest pain, shortness of breath, abdominal pain, nausea, vomiting, diarrhea, fevers, chills, dysuria, hematuria, recent illness travel or fall.

## 2023-10-04 NOTE — PROVIDER CONTACT NOTE (MEDICATION) - BACKGROUND
Pt admitted for hypercalcemia. Hx of pancreatic CA.
Pt admitted for hypercalemia and low potassium levels.

## 2023-10-04 NOTE — CONSULT NOTE ADULT - PROBLEM SELECTOR RECOMMENDATION 9
Severe Symptomatic Hypercalcemia, Ca 17.1.   S/p Xegiva (Oct 3) and Calcitonin 4units/kg x 1 in ER.   S/p 2L IVF with repeat 15.8.     Recs:  Bolus 1L LR followed by  ml/hr IVF maintenance fluid  Monitor I/O  Repeat Ca   HD consent obtained and given to HD unit incase the need arises (at the moment no indication for HD)    Thank you for this consult.  Bebeto Barron  Nephrology Fellow  Please contact me on TEAMS  After 5 pm please contact the on-call Fellow.
ppsv 30%: pt requires assistance with all ADLs/care/turn and positioning

## 2023-10-04 NOTE — H&P ADULT - NSHPLABSRESULTS_GEN_ALL_CORE
LABS:                         9.8    21.12 )-----------( 218      ( 03 Oct 2023 20:04 )             29.9     10-03    134<L>  |  98  |  24<H>  ----------------------------<  137<H>  3.2<L>   |  26  |  0.80    Ca    14.9<HH>      03 Oct 2023 23:08    TPro  6.3  /  Alb  3.1<L>  /  TBili  2.0<H>  /  DBili  x   /  AST  47<H>  /  ALT  75<H>  /  AlkPhos  259<H>  10-03    PT/INR - ( 03 Oct 2023 21:13 )   PT: 15.3 sec;   INR: 1.40 ratio    PTT - ( 03 Oct 2023 21:13 )  PTT:25.5 sec    Urinalysis Basic - ( 03 Oct 2023 23:08 )    Color: x / Appearance: x / SG: x / pH: x  Gluc: 137 mg/dL / Ketone: x  / Bili: x / Urobili: x   Blood: x / Protein: x / Nitrite: x   Leuk Esterase: x / RBC: x / WBC x   Sq Epi: x / Non Sq Epi: x / Bacteria: x    CAPILLARY BLOOD GLUCOSE    RADIOLOGY, EKG & ADDITIONAL TESTS: Reviewed.   < from: CT Head No Cont (10.03.23 @ 21:35) >      IMPRESSION:  No acute intracranial findings.    < end of copied text >    < from: Xray Chest 1 View- PORTABLE-Urgent (Xray Chest 1 View- PORTABLE-Urgent .) (10.03.23 @ 20:30) >    INTERPRETATION:  Right lower lung opacity, possibly infectious in   etiology.  Left lung is clear.    < end of copied text > LABS:                         9.8    21.12 )-----------( 218      ( 03 Oct 2023 20:04 )             29.9     10-03    134<L>  |  98  |  24<H>  ----------------------------<  137<H>  3.2<L>   |  26  |  0.80    Ca    14.9<HH>      03 Oct 2023 23:08    TPro  6.3  /  Alb  3.1<L>  /  TBili  2.0<H>  /  DBili  x   /  AST  47<H>  /  ALT  75<H>  /  AlkPhos  259<H>  10-03    PT/INR - ( 03 Oct 2023 21:13 )   PT: 15.3 sec;   INR: 1.40 ratio    PTT - ( 03 Oct 2023 21:13 )  PTT:25.5 sec    Urinalysis Basic - ( 03 Oct 2023 23:08 )    Color: x / Appearance: x / SG: x / pH: x  Gluc: 137 mg/dL / Ketone: x  / Bili: x / Urobili: x   Blood: x / Protein: x / Nitrite: x   Leuk Esterase: x / RBC: x / WBC x   Sq Epi: x / Non Sq Epi: x / Bacteria: x    CAPILLARY BLOOD GLUCOSE    RADIOLOGY, EKG & ADDITIONAL TESTS: Reviewed.     ECG: NSR    < from: CT Head No Cont (10.03.23 @ 21:35) >  IMPRESSION:  No acute intracranial findings.  < end of copied text >    < from: Xray Chest 1 View- PORTABLE-Urgent (Xray Chest 1 View- PORTABLE-Urgent .) (10.03.23 @ 20:30) >  INTERPRETATION:  Right lower lung opacity, possibly infectious in   etiology.  Left lung is clear.  < end of copied text > LABS:                         9.8    21.12 )-----------( 218      ( 03 Oct 2023 20:04 )             29.9     10-03    134<L>  |  98  |  24<H>  ----------------------------<  137<H>  3.2<L>   |  26  |  0.80    Ca    14.9<HH>      03 Oct 2023 23:08    TPro  6.3  /  Alb  3.1<L>  /  TBili  2.0<H>  /  DBili  x   /  AST  47<H>  /  ALT  75<H>  /  AlkPhos  259<H>  10-03    PT/INR - ( 03 Oct 2023 21:13 )   PT: 15.3 sec;   INR: 1.40 ratio    PTT - ( 03 Oct 2023 21:13 )  PTT:25.5 sec    Urinalysis Basic - ( 03 Oct 2023 23:08 )    Color: x / Appearance: x / SG: x / pH: x  Gluc: 137 mg/dL / Ketone: x  / Bili: x / Urobili: x   Blood: x / Protein: x / Nitrite: x   Leuk Esterase: x / RBC: x / WBC x   Sq Epi: x / Non Sq Epi: x / Bacteria: x    CAPILLARY BLOOD GLUCOSE    RADIOLOGY, EKG & ADDITIONAL TESTS: Reviewed.     ECG (10/3/23) personally reviewed: NSR, HR 63, QTc 388, no significant ST elevations/depressions appreciated    < from: CT Head No Cont (10.03.23 @ 21:35) >  IMPRESSION:  No acute intracranial findings.  < end of copied text >    < from: Xray Chest 1 View- PORTABLE-Urgent (Xray Chest 1 View- PORTABLE-Urgent .) (10.03.23 @ 20:30) >  INTERPRETATION:  Right lower lung opacity, possibly infectious in   etiology.  Left lung is clear.  < end of copied text >

## 2023-10-04 NOTE — CONSULT NOTE ADULT - PROBLEM SELECTOR RECOMMENDATION 3
- MRCP 8/25/23 showed hepatic lesions as large as 2-3 cm as well as pancreatic lesion 4-5 cm. EGD/EUS from 9/5/23 showed duodenal invasion with ulceration  - Bx consistent w/ pancreatic adenocarcinoma  defer to heme/onc for further management

## 2023-10-04 NOTE — CONSULT NOTE ADULT - PROBLEM SELECTOR RECOMMENDATION 4
see [facility] note above  surrogate is patient's  Marlo Thao   patient to remain full code now, family is hopeful mental status will improve and she will be able to participate in conversation

## 2023-10-04 NOTE — H&P ADULT - ATTENDING COMMENTS
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 On my encounter, AAOx2 (oriented to self and place, but not month/year).     #Symptomatic hypercalcemia, likely 2/2 malignancy  #RLL opacity, r/o ?asp PNA  #LLE below knee DVT  #Stage IV Pancreatic cancer  #HTN  #DM2  #Anxiety    - s/p 3L IVF in ED, c/w LR @125cc/hr for now  - s/p Xegiva (10/3/23) and calcitonin 4u/kg x1 in ED  - monitor serum calcium/ionized calcium levels  - monitor on telemetry given electrolyte abnormalities  - monitor WBC and for fevers  - monitor BP, ensure pain control  - c/w bowel regimen  - c/w home meds  - fall/aspiration precautions  - s/p CTX/azithro x1 dose in ED; monitoring off further abx for now given no notable symptoms to suggest PNA at this time and has been afebrile and on RA, but low threshold to restart  - f/u procal and CT chest non con to further eval RLL opacity noted on CXR  - f/u repeat LLE duplex for serial evaluation of known LLE DVT to monitor for clot propagation  - SCDs on RLE only for DVT ppx  (given existing LLE DVT and increased risk of GIB in setting of malignant ulcer); can discuss with Heme/Onc whether chemical ppx is reasonable  - Appreciate Nephrology and Heme/Onc recs; f/u further recs in AM  - FULL CODE for now, continue further GOC discussions; f/u Palliative care recs (consulted)  - Rest of care as per plan above

## 2023-10-04 NOTE — PROVIDER CONTACT NOTE (MEDICATION) - SITUATION
Stat Atenolol 25mg PO given for increased BP given at 2am. Morning Atenolol scheduled for 6 am.
pt unable to tolerate AM PO meds

## 2023-10-04 NOTE — H&P ADULT - NSHPREVIEWOFSYSTEMS_GEN_ALL_CORE
REVIEW OF SYSTEMS:    CONSTITUTIONAL: +weakness, confusion, fatigue. Decreased appetite. No fevers or chills  EYES/ENT: No visual changes  RESPIRATORY: No cough, wheezing, hemoptysis; No shortness of breath  CARDIOVASCULAR: No chest pain or palpitations  GASTROINTESTINAL: +abd discomfort. No nausea, vomiting, or hematemesis; No diarrhea or constipation. No melena or hematochezia.  GENITOURINARY: No dysuria, frequency or hematuria  NEUROLOGICAL: No numbness or weakness  SKIN: No itching, rashes  MUSCULOSKELETAL: No joint pain, muscle pain. REVIEW OF SYSTEMS:    CONSTITUTIONAL: +weakness, confusion, fatigue. Decreased appetite. No fevers or chills  EYES/ENT: No visual changes  RESPIRATORY: +mild cough. No wheezing, hemoptysis; No shortness of breath  CARDIOVASCULAR: No chest pain or palpitations  GASTROINTESTINAL: +abd discomfort. +constipation. No nausea, vomiting, or hematemesis; No diarrhea No melena or hematochezia.  GENITOURINARY: No dysuria, frequency or hematuria  NEUROLOGICAL: +weakness. No numbness  SKIN: No itching, rashes  MUSCULOSKELETAL: No joint pain, muscle pain.

## 2023-10-04 NOTE — H&P ADULT - CONVERSATION DETAILS
Discussed goals of care and code status with patient, son, and  at bedside. Patient currently with stage IV pancreatic cancer with plans for palliative RT / chemo. Patient states understanding that her diagnosis is terminal and that the focus of treatment at this time is to avoid suffering. Discussed how CPR and intubation is not benign and can cause more suffering. Patient and family stated understanding. Patient stated she is too tired at this time and wished for everything to be done for now and she will discuss more tmrw. Confirmed with patient and family that she is to remain full code at this time; they agreed. Discussed palliative care referral and possible hospice, family in agreement and wish to learn more about it. Consult placed.

## 2023-10-04 NOTE — H&P ADULT - HISTORY OF PRESENT ILLNESS
78F w/ PMHx of stage IV pancreatic cancer C/B biliary obstruction s/p PTC placement, duodenal ulcer w/ bleeding secondary to pancreatic mass invasion was sent in from OP oncologist office (Dr. Kelly Velázquez) for hypercalcemia (Ca 17) in the setting of a few days of fatigue, decreased p.o. intake, abdominal pain, and confusion.  On chart review, patient received Xgeva (Denosumab, 3-day onset of action, 10-day peak onset) today (10/3).  She is also been getting IV fluids with minimal improvement in calcium levels.  She endorses confusion/difficulty concentrating, fatigue, decreased p.o. intake, and nonspecific abdominal pain.  She denies any fever/chills/cough/sore throat, CP, SOB,  symptoms, and recent trauma.  Of note, chemotherapy was not started patient supposed to get radiotherapy soon.  also reports that patient's biliary drain tubing was nicked with temporary fix in place.  PCP is Dr. Taylor.    In ED, pt HDS afebrile breathing well on RA.  Labs notable for leukocytosis wbc21, normocytic anemia, hypokalemia, hypercalcemia. CTH noncon unremarkable, CXR w/ RLL opacity?. Pt received calcitonin 4u/kg, CTX/azithro, 2L IVF, KCl repletion, admitted to medicine for further workup.  78F w/ PMHx of stage IV pancreatic cancer C/B biliary obstruction s/p PTC placement, duodenal ulcer w/ bleeding secondary to pancreatic mass invasion was sent in from OP oncologist office (Dr. Kelly Velázquez) for hypercalcemia (Ca 17) in the setting of a few days of fatigue, decreased p.o. intake, abdominal pain, and confusion.  On chart review, patient received Xgeva (Denosumab, 3-day onset of action, 10-day peak onset) today (10/3).  She is also been getting IV fluids with minimal improvement in calcium levels.  She endorses confusion/difficulty concentrating, fatigue, decreased p.o. intake, and nonspecific abdominal pain.  She denies any fever/chills/cough/sore throat, CP, SOB,  symptoms, and recent trauma, recent ingestion of antacids or other high calcium supplements. Of note, chemotherapy was not started patient supposed to get radiotherapy soon.  also reports that patient's biliary drain tubing was nicked with temporary fix in place.  PCP is Dr. Taylor.    In ED, pt HDS afebrile breathing well on RA.  Labs notable for leukocytosis wbc21, normocytic anemia, hypokalemia, hypercalcemia. CTH noncon unremarkable, CXR w/ RLL opacity c/f infection. Pt received calcitonin 4u/kg, CTX/azithro, 2L IVF, KCl repletion, admitted to medicine for further workup.

## 2023-10-04 NOTE — CHART NOTE - NSCHARTNOTEFT_GEN_A_CORE
Seen for rad onc consult prior.    She was discharged from [facility] And is planned for outpatient RT at [facility] on the TrueBeam 1 machine, 5 fractions, to the pancreas site,  to go from 10/4 to 10/11.     Now readmitted to [facility] for hypercalcemia and "abnormal labs."     Comprehensive Metabolic Panel (10.04.23 @ 07:02)    Calcium: 14.0 mg/dL  (range up to 10.5)     Notified Rad Onc Dr. Nguyen, RT on hold at the moment until labs are corrected while inpatient.

## 2023-10-04 NOTE — H&P ADULT - PROBLEM SELECTOR PLAN 4
- f/u anemia w/u  - trend CBC, maintain active T&S, transfuse to goal Hgb > 7, Plt > 10, >15 if febrile, >50 if active bleed or procedure h/o GIB iso invasive pancreatic cancer.     - f/u anemia w/u  - trend CBC, maintain active T&S, transfuse to goal Hgb > 7, Plt > 10, >15 if febrile, >50 if active bleed or procedure

## 2023-10-04 NOTE — H&P ADULT - TIME BILLING
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 On my encounter, AAOx2 (oriented to self and place, but not month/year).     #Symptomatic hypercalcemia, likely 2/2 malignancy  #RLL opacity, r/o ?asp PNA  #LLE below knee DVT  #Stage IV Pancreatic cancer  #HTN  #DM2  #Anxiety    - s/p 3L IVF in ED, c/w LR @125cc/hr for now  - s/p Xegiva (10/3/23) and calcitonin 4u/kg x1 in ED  - monitor serum calcium/ionized calcium levels  - monitor on telemetry given electrolyte abnormalities  - monitor WBC and for fevers  - monitor BP, ensure pain control  - c/w bowel regimen  - c/w home meds  - fall/aspiration precautions  - s/p CTX/azithro x1 dose in ED; monitoring off further abx for now given no notable symptoms to suggest PNA at this time and has been afebrile and on RA, but low threshold to restart  - f/u procal and CT chest non con to further eval RLL opacity noted on CXR  - f/u repeat LLE duplex for serial evaluation of known LLE DVT to monitor for clot propagation  - SCDs on RLE only for DVT ppx  (given existing LLE DVT and increased risk of GIB in setting of malignant ulcer); can discuss with Heme/Onc whether chemical ppx is reasonable  - Appreciate Nephrology and Heme/Onc recs; f/u further recs in AM  - FULL CODE for now, continue further GOC discussions; f/u Palliative care recs (consulted)  - Rest of care as per plan above reviewing prior documentation, independently obtaining a history and interpreting results of tests, performing a physical examination, reviewing tests/imaging, discussing the plan with the patient, ordering medications/tests, documenting clinical information in the electronic health record, and coordinating care with staff.

## 2023-10-04 NOTE — H&P ADULT - PROBLEM SELECTOR PLAN 3
MRCP 8/25/23 showed hepatic lesions as large as 2-3 cm as well as pancreatic lesion 4-5 cm. EGD/EUS from 9/5/23 showed duodenal invasion with ulceration- bx consistent w/ pancreatic adenocarcinoma, CT C/A/P on this admission showing increased liver mets, intra,extrahepatic mild/moderate biliary dilatation 2/2 to pancreatic head mass w/ bilirubin elevations  EGD/ERCP 9/21, unable to stent, concern for bleeding, hemostatic gel placed in area of possible bleeding, celiac nerve plexus block performed 9/21    - c/w home PPI  - pain control prn, titrate as needed  -    # Biliary drain?  Placed 9/25  - MRCP 8/25/23 showed hepatic lesions as large as 2-3 cm as well as pancreatic lesion 4-5 cm. EGD/EUS from 9/5/23 showed duodenal invasion with ulceration- bx consistent w/ pancreatic adenocarcinoma, CT C/A/P on this admission showing increased liver mets, intra,extrahepatic mild/moderate biliary dilatation 2/2 to pancreatic head mass w/ bilirubin elevations  EGD/ERCP 9/21, unable to stent, concern for bleeding, hemostatic gel placed in area of possible bleeding, celiac nerve plexus block performed 9/21    - c/w home PPI  - pain control prn, titrate as needed  - hemeonc suggested methadone -- can adjust pain regimen accordingly  - flush biliary drain 5cc q12h  - further GoC  - palliative consulted; pending eval  - PT consulted; pending eval

## 2023-10-04 NOTE — H&P ADULT - PROBLEM SELECTOR PLAN 1
Likely iso malignancy. ECG w/o changes.     - will defer further workup given known etiology  - c/w aggressive IVF  - s/p calcitonin, bisphosphonate  - trend BMP Mg Ph q6h Likely iso malignancy. ECG w/o changes. Notable lethargy but exam otherwise unremarkable.    - will defer further workup given known etiology  - c/w aggressive IVF  - s/p calcitonin, bisphosphonate; reeval in AM  - trend BMP Mg Ph iCal q6h  - nephrology following; recs appreciated

## 2023-10-04 NOTE — H&P ADULT - ASSESSMENT
78F w stage IV pancreatic cancer C/B biliary obstruction s/p PTC placement pending palliative RT/Chemo, duodenal ulcer w/ bleeding secondary to pancreatic mass invasion, LLE DVT, HTN, T2DM, Anxiety p/w worsening hypercalcemia and nonspecific abdominal pain.

## 2023-10-04 NOTE — CONSULT NOTE ADULT - SUBJECTIVE AND OBJECTIVE BOX
[facility] DIVISION OF INFECTIOUS DISEASES  CHLOÉ Colmenares S. Shah, Y. Patel, G. Kishore  [phone]  ([phone] - weekdays after 5pm and weekends)    JANETT FUNEZ  78y, Female  [id number]    HPI:  Patient is a 78 year old female with PMH of stage IV pancreatic cancer complicated by biliary obstruction s/p PTC placement, duodenal ulcer with bleeding secondary to pancreatic mass invasion was sent in from OP oncologist office (Dr. Kelly Velázquez) for hypercalcemia (Ca 17) in the setting of a few days of fatigue, decreased p.o. intake, abdominal pain, and confusion.  On chart review, patient received Xgeva (Denosumab, 3-day onset of action, 10-day peak onset) today (10/3).  She is also been getting IV fluids with minimal improvement in calcium levels.  She endorses confusion/difficulty concentrating, fatigue, decreased p.o. intake, and nonspecific abdominal pain.  She denies any fever, chills, cough, sore throat, chest pain, dyspnea, urinary symptoms, and recent trauma, recent ingestion of antacids or other high calcium supplements. Of note, chemotherapy was not started, patient supposed to get radiotherapy soon.  also reports that patient's biliary drain tubing was nicked with temporary fix in place.  PCP is Dr. Taylor.  In ED, pt HDS afebrile breathing well on RA.  Labs notable for leukocytosis wbc 21, normocytic anemia, hypokalemia, hypercalcemia. CTH noncon unremarkable, CXR w/ RLL opacity c/f infection. Pt received calcitonin 4u/kg, CTX/azithro, 2L IVF, KCl repletion, admitted to medicine for further workup.  (04 Oct 2023 00:29)  ROS: 14 point review of systems completed, pertinent positives and negatives as per HPI.    Allergies: No Known Allergies    PMH -- Insomnia  Pancreas cancer    PSH -- Fx Wrist left  S/P Bunionectomy bilateral    FH -- Family history of myelodysplasia (Mother)  Family history of aortic stenosis (Mother)  Family history of stomach cancer (Sibling)    Social History -- denies tobacco, alcohol or illicit drug use    Physical Exam--  Vital Signs Last 24 Hrs  T(F): 97.9 (04 Oct 2023 04:37), Max: 97.9 (04 Oct 2023 04:37)  HR: 57 (04 Oct 2023 05:28) (57 - 72)  BP: 174/87 (04 Oct 2023 05:28) (156/73 - 183/82)  RR: 18 (04 Oct 2023 04:37) (16 - 18)  SpO2: 93% (04 Oct 2023 04:37) (93% - 96%)  General: no acute distress  HEENT: NC/AT, EOMI, anicteric, neck supple  Lungs: Clear bilaterally without rales, wheezing or rhonchi  Heart: S1, S2 present, RRR. No murmur, rub or gallop.  Abdomen: Soft. Nondistended. Nontender. BS present.   Neuro: AAOx3, no obvious focal deficits   Extremities: No cyanosis or clubbing. No edema.   Skin: Warm. Dry. Good turgor. No visible rash.   Psychiatric: Appropriate affect and mood for situation.   Lines: PIV    Laboratory & Imaging Data--  CBC:                       10.0   22.07 )-----------( 277      ( 04 Oct 2023 07:02 )             29.6     WBC Count: 22.07 K/uL (10-04-23 @ 07:02)  WBC Count: 21.12 K/uL (10-03-23 @ 20:04)  WBC Count: 21.50 K/uL (10-02-23 @ 14:15)  WBC Count: 20.98 K/uL (10-02-23 @ 11:21)    CMP: 10-04    136  |  99  |  22  ----------------------------<  123<H>  3.8   |  27  |  0.73    Ca    14.0<HH>      04 Oct 2023 07:02  Phos  2.1     10-04  Mg     1.6     10-04    TPro  6.2  /  Alb  3.0<L>  /  TBili  2.0<H>  /  DBili  x   /  AST  48<H>  /  ALT  73<H>  /  AlkPhos  271<H>  10-04    LIVER FUNCTIONS - ( 04 Oct 2023 07:02 )  Alb: 3.0 g/dL / Pro: 6.2 g/dL / ALK PHOS: 271 U/L / ALT: 73 U/L / AST: 48 U/L / GGT: x           Microbiology: reviewed  Radiology--reviewed  < from: CT Head No Cont (10.03.23 @ 21:35) >  IMPRESSION:  No acute intracranial findings.    < end of copied text >    < from: Xray Chest 1 View- PORTABLE-Urgent (Xray Chest 1 View- PORTABLE-Urgent .) (10.03.23 @ 20:30) >  IMPRESSION:  Right lower lobe opacity possibly infectious in etiology.    < end of copied text >    Active Medications--  atenolol  Tablet 25 milliGRAM(s) Oral daily  dextrose 5%. 1000 milliLiter(s) IV Continuous <Continuous>  dextrose 5%. 1000 milliLiter(s) IV Continuous <Continuous>  dextrose 50% Injectable 25 Gram(s) IV Push once  dextrose 50% Injectable 25 Gram(s) IV Push once  dextrose 50% Injectable 12.5 Gram(s) IV Push once  dextrose Oral Gel 15 Gram(s) Oral once PRN  DULoxetine 20 milliGRAM(s) Oral daily  gabapentin 100 milliGRAM(s) Oral three times a day  glucagon  Injectable 1 milliGRAM(s) IntraMuscular once  insulin lispro (ADMELOG) corrective regimen sliding scale   SubCutaneous three times a day before meals  insulin lispro (ADMELOG) corrective regimen sliding scale   SubCutaneous at bedtime  lactated ringers. 500 milliLiter(s) IV Continuous <Continuous>  oxyCODONE    IR 5 milliGRAM(s) Oral every 4 hours PRN  oxyCODONE    IR 2.5 milliGRAM(s) Oral every 4 hours PRN  pantoprazole    Tablet 40 milliGRAM(s) Oral two times a day  polyethylene glycol 3350 17 Gram(s) Oral daily PRN  potassium phosphate IVPB 15 milliMole(s) IV Intermittent once  senna 2 Tablet(s) Oral at bedtime     Prior/Completed Antimicrobials:  azithromycin  IVPB  cefTRIAXone   IVPB       [facility] DIVISION OF INFECTIOUS DISEASES  CHLOÉ Colmenares S. Shah, Y. Patel, G. Kishore  [phone]  ([phone] - weekdays after 5pm and weekends)    JANETT FUNEZ  78y, Female  [id number]    HPI:  Patient is a 78 year old female with PMH of stage IV pancreatic cancer complicated by biliary obstruction s/p PTC placement, duodenal ulcer with bleeding secondary to pancreatic mass invasion was sent in from OP oncologist office (Dr. Kelly Velázquez) for hypercalcemia (Ca 17) in the setting of a few days of fatigue, decreased p.o. intake, abdominal pain, and confusion.  On chart review, patient received Xgeva (Denosumab, 3-day onset of action, 10-day peak onset) today (10/3).  She is also been getting IV fluids with minimal improvement in calcium levels.  She endorses confusion/difficulty concentrating, fatigue, decreased p.o. intake, and nonspecific abdominal pain.  She denies any fever, chills, cough, sore throat, chest pain, dyspnea, urinary symptoms, and recent trauma, recent ingestion of antacids or other high calcium supplements. Of note, chemotherapy was not started, patient supposed to get radiotherapy soon.  also reports that patient's biliary drain tubing was nicked with temporary fix in place.  PCP is Dr. Taylor.  In ED, pt HDS afebrile breathing well on RA.  Labs notable for leukocytosis wbc 21, normocytic anemia, hypokalemia, hypercalcemia. CTH noncon unremarkable, CXR w/ RLL opacity c/f infection. Pt received calcitonin 4u/kg, CTX/azithro, 2L IVF, KCl repletion, admitted to medicine for further workup.  (04 Oct 2023 00:29)  Patient seen and examined at bedside this morning in the ER,  at bedside. Patients  assisted with history as patient appears confused; confirms above. Patient currently has abdominal pain and points to her epigastric area. Per , her pain has been controlled by methadone, last dose yesterday. Patient has a drain in place and  states it has not put out as much as it usually does. Patient has not had any fever, chills, dyspnea, chest pain.  states she had a cough a few days ago but was brief and resolved. He has not noted any issues with patients swallow; states she overall has not been eating much but no coughing, choking sensation or issues when she swallows food and liquids. Denies any nausea or vomiting.   ROS: 14 point review of systems completed, pertinent positives and negatives as per HPI.    Allergies: No Known Allergies    PMH -- Insomnia  Pancreas cancer    PSH -- Fx Wrist left  S/P Bunionectomy bilateral    FH -- Family history of myelodysplasia (Mother)  Family history of aortic stenosis (Mother)  Family history of stomach cancer (Sibling)    Social History -- denies tobacco, alcohol or illicit drug use    Physical Exam--  Vital Signs Last 24 Hrs  T(F): 97.9 (04 Oct 2023 04:37), Max: 97.9 (04 Oct 2023 04:37)  HR: 57 (04 Oct 2023 05:28) (57 - 72)  BP: 174/87 (04 Oct 2023 05:28) (156/73 - 183/82)  RR: 18 (04 Oct 2023 04:37) (16 - 18)  SpO2: 93% (04 Oct 2023 04:37) (93% - 96%)  General: no acute distress  HEENT: NC/AT, EOMI, anicteric, neck supple  Lungs: decreased breath sounds b/l, no rales/wheezes  Heart: S1, S2 present, RRR. No murmur, rub or gallop.  Abdomen: Soft. ND, mild generalized TTP, L sided drain with minimal yellow/brown output   Neuro: AAOx3, no obvious focal deficits   Extremities: No cyanosis or clubbing. No edema.   Skin: Warm. Dry. Good turgor. No visible rash.   Psychiatric: Appropriate affect and mood for situation.   Lines: PIV    Laboratory & Imaging Data--  CBC:                       10.0   22.07 )-----------( 277      ( 04 Oct 2023 07:02 )             29.6     WBC Count: 22.07 K/uL (10-04-23 @ 07:02)  WBC Count: 21.12 K/uL (10-03-23 @ 20:04)  WBC Count: 21.50 K/uL (10-02-23 @ 14:15)  WBC Count: 20.98 K/uL (10-02-23 @ 11:21)    CMP: 10-04    136  |  99  |  22  ----------------------------<  123<H>  3.8   |  27  |  0.73    Ca    14.0<HH>      04 Oct 2023 07:02  Phos  2.1     10-04  Mg     1.6     10-04    TPro  6.2  /  Alb  3.0<L>  /  TBili  2.0<H>  /  DBili  x   /  AST  48<H>  /  ALT  73<H>  /  AlkPhos  271<H>  10-04    LIVER FUNCTIONS - ( 04 Oct 2023 07:02 )  Alb: 3.0 g/dL / Pro: 6.2 g/dL / ALK PHOS: 271 U/L / ALT: 73 U/L / AST: 48 U/L / GGT: x           Microbiology: reviewed  Radiology--reviewed  < from: CT Head No Cont (10.03.23 @ 21:35) >  IMPRESSION:  No acute intracranial findings.    < end of copied text >    < from: Xray Chest 1 View- PORTABLE-Urgent (Xray Chest 1 View- PORTABLE-Urgent .) (10.03.23 @ 20:30) >  IMPRESSION:  Right lower lobe opacity possibly infectious in etiology.    < end of copied text >    Active Medications--  atenolol  Tablet 25 milliGRAM(s) Oral daily  dextrose 5%. 1000 milliLiter(s) IV Continuous <Continuous>  dextrose 5%. 1000 milliLiter(s) IV Continuous <Continuous>  dextrose 50% Injectable 25 Gram(s) IV Push once  dextrose 50% Injectable 25 Gram(s) IV Push once  dextrose 50% Injectable 12.5 Gram(s) IV Push once  dextrose Oral Gel 15 Gram(s) Oral once PRN  DULoxetine 20 milliGRAM(s) Oral daily  gabapentin 100 milliGRAM(s) Oral three times a day  glucagon  Injectable 1 milliGRAM(s) IntraMuscular once  insulin lispro (ADMELOG) corrective regimen sliding scale   SubCutaneous three times a day before meals  insulin lispro (ADMELOG) corrective regimen sliding scale   SubCutaneous at bedtime  lactated ringers. 500 milliLiter(s) IV Continuous <Continuous>  oxyCODONE    IR 5 milliGRAM(s) Oral every 4 hours PRN  oxyCODONE    IR 2.5 milliGRAM(s) Oral every 4 hours PRN  pantoprazole    Tablet 40 milliGRAM(s) Oral two times a day  polyethylene glycol 3350 17 Gram(s) Oral daily PRN  potassium phosphate IVPB 15 milliMole(s) IV Intermittent once  senna 2 Tablet(s) Oral at bedtime     Prior/Completed Antimicrobials:  azithromycin  IVPB  cefTRIAXone   IVPB

## 2023-10-04 NOTE — PROVIDER CONTACT NOTE (MEDICATION) - ACTION/TREATMENT ORDERED:
Provider notified and aware. Hold dose and rescheduled for later as per provider.
MD aware. Meds held. Continue to monitor pt and notify of any changes

## 2023-10-04 NOTE — CONSULT NOTE ADULT - ASSESSMENT
Patient is a 78 year old female with PMH of stage IV pancreatic cancer complicated by biliary obstruction s/p PTC placement, duodenal ulcer with bleeding secondary to pancreatic mass invasion was sent in from OP oncologist office (Dr. Kelly Velázquez) for hypercalcemia (Ca 17) in the setting of a few days of fatigue, decreased p.o. intake, abdominal pain, and confusion.  On chart review, patient received Xgeva (Denosumab, 3-day onset of action, 10-day peak onset) today (10/3).  She is also been getting IV fluids with minimal improvement in calcium levels.  She endorses confusion/difficulty concentrating, fatigue, decreased p.o. intake, and nonspecific abdominal pain with black stools and dark urine for 3-4 days and was admitted for pain control for worsening abdominal pain, obstructive jaundice, hypercalcemia and found to have possible R sided pneumonia.  Patient is a 78 year old female with PMH of stage IV pancreatic cancer complicated by biliary obstruction s/p PTC placement, duodenal ulcer with bleeding secondary to pancreatic mass invasion was sent in from OP oncologist office (Dr. Kelly Velázquez) for hypercalcemia (Ca 17) in the setting of a few days of fatigue, decreased p.o. intake, abdominal pain, and confusion.  On chart review, patient received Xgeva (Denosumab, 3-day onset of action, 10-day peak onset) today (10/3).  She is also been getting IV fluids with minimal improvement in calcium levels.  She endorses confusion/difficulty concentrating, fatigue, decreased p.o. intake, and nonspecific abdominal pain with black stools and dark urine for 3-4 days and was admitted for pain control for worsening abdominal pain, obstructive jaundice, hypercalcemia and found to have possible R sided pneumonia.     Rule out R sided pneumonia    - CXR with RLL opacity possibly infectious but patient with no resp symptoms other than brief cough a few days ago   - afebrile, leukocytosis noted - may be multifactorial, saturating well on RA   - per  no sign of aspiration noted with eating/drinking, though overall poor oral intake   - CT chest without contrast done shortly before patient seen -- reviewed     -- no evidence of pneumonia; does have a new L apial 0.4cm nodule which could be mets    - s/p ceftriaxone and azithromycin in the ER   Hypercalcemia likely in setting of pancreatic cancer with hepatic mets, s/p biliary drain 9/25   - Nephrology, Heme/Onc and GI following     Recommendations   Hold off on further antibiotics at this time given no pneumonia on CT  Drain care - may need IR evaluation - per  drain has dec output today?  Monitor temps/CBC  Continue care per primary team     D/w NP Moraima Cespedes M.D.  [facility], Division of Infectious Diseases  [phone]  After 5pm on weekdays and all day on weekends - please call [phone]       Patient is a 78 year old female with PMH of stage IV pancreatic cancer complicated by biliary obstruction s/p PTC placement, duodenal ulcer with bleeding secondary to pancreatic mass invasion was sent in from OP oncologist office (Dr. Kelly Velázquez) for hypercalcemia (Ca 17) in the setting of a few days of fatigue, decreased p.o. intake, abdominal pain, and confusion.  On chart review, patient received Xgeva (Denosumab, 3-day onset of action, 10-day peak onset) today (10/3).  She is also been getting IV fluids with minimal improvement in calcium levels.  She endorses confusion/difficulty concentrating, fatigue, decreased p.o. intake, and nonspecific abdominal pain with black stools and dark urine for 3-4 days and was admitted for pain control for worsening abdominal pain, obstructive jaundice, hypercalcemia and found to have possible R sided pneumonia.     Rule out R sided pneumonia vs atelectasis    - CXR with RLL opacity possibly infectious but patient with no resp symptoms other than brief cough a few days ago   - afebrile, leukocytosis noted - may be multifactorial, saturating well on RA   - per  no sign of aspiration noted with eating/drinking, though overall poor oral intake   - CT chest without contrast done shortly before patient seen -- reviewed     -- has atelectasis with no evidence of pneumonia; does have a new L apical 0.4cm nodule which could be mets    - s/p ceftriaxone and azithromycin in the ER   Hypercalcemia likely in setting of pancreatic cancer with hepatic mets, s/p biliary drain 9/25   - Nephrology, Heme/Onc and GI following     Recommendations   Hold off on further antibiotics at this time given no pneumonia on CT  Drain care - may need IR evaluation - per  drain has dec output today?  Monitor temps/CBC  Continue care per primary team     D/w NP Moraima Cespedes M.D.  [facility], Division of Infectious Diseases  [phone]  After 5pm on weekdays and all day on weekends - please call [phone]       Patient is a 78 year old female with PMH of stage IV pancreatic cancer complicated by biliary obstruction s/p PTC placement, duodenal ulcer with bleeding secondary to pancreatic mass invasion was sent in from OP oncologist office (Dr. Kelly Velázquez) for hypercalcemia (Ca 17) in the setting of a few days of fatigue, decreased p.o. intake, abdominal pain, and confusion.  On chart review, patient received Xgeva (Denosumab, 3-day onset of action, 10-day peak onset) today (10/3).  She is also been getting IV fluids with minimal improvement in calcium levels.  She endorses confusion/difficulty concentrating, fatigue, decreased p.o. intake, and nonspecific abdominal pain with black stools and dark urine for 3-4 days and was admitted for pain control for worsening abdominal pain, obstructive jaundice, hypercalcemia and found to have possible R sided pneumonia.     Rule out R sided pneumonia vs atelectasis    - CXR with RLL opacity possibly infectious but patient with no resp symptoms other than brief cough a few days ago   - afebrile, leukocytosis noted - may be multifactorial, saturating well on RA   - per  no sign of aspiration noted with eating/drinking, though overall poor oral intake   - CT chest without contrast done shortly before patient seen -- reviewed     -- has atelectasis with no evidence of pneumonia; does have a new L apical 0.4cm nodule which could be mets    - s/p ceftriaxone and azithromycin in the ER   Hypercalcemia likely in setting of pancreatic cancer with hepatic mets, s/p biliary drain 9/25   - Nephrology, Heme/Onc and GI following     Recommendations   Follow blood cultures - in process x2   Hold off on further antibiotics at this time given no pneumonia on CT  Drain care - may need IR evaluation - per  drain has dec output today?  Monitor temps/CBC  Continue care per primary team     D/w NP Moraima Cespedes M.D.  [facility], Division of Infectious Diseases  [phone]  After 5pm on weekdays and all day on weekends - please call [phone]

## 2023-10-04 NOTE — H&P ADULT - PROBLEM SELECTOR PLAN 5
Not on AC d/t bleed?    - ctm L soleal DVT below the knee. Not on AC d/t bleed    - ctm L soleal DVT below the knee. Not on AC d/t bleed    - ctm  - can address w/ heme onc if of large concern  - f/u repeat duplex to r/o clot prorogation

## 2023-10-04 NOTE — H&P ADULT - PROBLEM SELECTOR PLAN 9
Hospital Bundle    Fluids:   Electrolytes: Replete K > 4, Mg > 2, Phos > 3  Nutrition: Diet   PPX  ---VTE: LSQ  ---GI:   ---Resp:   Access: PIV  Code Status:   Dispo: pending clinical improvement Hospital Bundle    Fluids: IVF  Electrolytes: Replete K > 4, Mg > 2, Phos > 3  Nutrition: Diet Regular, liberal diet iso cancer  PPX  ---VTE: SCD recent GIB  ---GI: n/a  ---Resp: n/a  Access: PIV  Code Status: FULL CODE (see GOC)  Dispo: pending clinical improvement Hospital Bundle    Fluids: IVF  Electrolytes: Replete K > 4, Mg > 2, Phos > 3  Nutrition: Diet Regular, liberal diet iso cancer  PPX  ---VTE: SCD recent GIB  ---GI: n/a  ---Resp: n/a  Access: PIV  Code Status: FULL CODE (see GOC)  Dispo: pending PT eval Hospital Bundle    Fluids: IVF  Electrolytes: Replete K > 4, Mg > 2, Phos > 3  Nutrition: Diet Regular, liberal diet iso cancer  PPX  ---VTE: SCD on non clot leg  ---GI: n/a  ---Resp: n/a  Access: PIV  Code Status: FULL CODE (see GOC)  Dispo: pending PT eval

## 2023-10-05 NOTE — PHYSICAL THERAPY INITIAL EVALUATION ADULT - ADDITIONAL COMMENTS
Pt lethargic, social history obtained per son at bedside.  Pt lives in a house with 0 steps to enter and bedroom on the first floor.  Pt lives with .  Pt owns a rollator, prior to cancer diagnosis ~4weeks ago per son, was ambulating independently, has become progressively weaker over past few weeks.  Pt independent ADLs.

## 2023-10-05 NOTE — PATIENT PROFILE ADULT - FALL HARM RISK - HARM RISK INTERVENTIONS

## 2023-10-05 NOTE — PHYSICAL THERAPY INITIAL EVALUATION ADULT - IMPAIRMENTS CONTRIBUTING IMPAIRED BED MOBILITY, REHAB EVAL
+pt very lethargic, difficulty keeping eyes open, +poor trunk control while sitting at edge of bed/impaired balance/impaired coordination/impaired motor control/impaired postural control/decreased strength

## 2023-10-05 NOTE — PHYSICAL THERAPY INITIAL EVALUATION ADULT - PERTINENT HX OF CURRENT PROBLEM, REHAB EVAL
Pt is a 78 year old female with PMH significant for stage IV pancreatic cancer complicated by biliary obstruction s/p PTC placement, duodenal ulcer w/ bleeding secondary to pancreatic mass invasion.  Pt presents after being sent in from OP oncologist office (Dr. Kelly Velázquez) for hypercalcemia (Ca 17) in the setting of a few days of fatigue, decreased p.o. intake, abdominal pain, and confusion.  She endorses confusion/difficulty concentrating, fatigue, decreased p.o. intake, and nonspecific abdominal pain. Pt also presents with weakness, black stools and dark urine for 3-4 days, jaundice and worsending abdominal pain.  In ED  labs notable for leukocytosis wbc21, normocytic anemia, hypokalemia, hypercalcemia. Pt received calcitonin 4u/kg, CTX/azithro, 2L IVF, KCl repletion, admitted to medicine for further workup.  Pt found to have previously identified left soleal vein DVT, as well as acute below the knee DVT affecting the left posterior tibial vein.  Pt placed on lovenox.  CT Head (10/3): No acute intracranial findings.  CXR (10/3): Right lower lobe opacity possibly infectious in etiology.  CT Chest (10/4): No evidence of pneumonia.  Left apical 0.4 cm nodule is new compared to 9/16/2023; indeterminate, but could represent metastasis. Hepatic lesion /metastasis appear progressed in size compared to 9/16/2023.  VA Duplex L LE(10/4): In addition to the previously identified left soleal vein DVT, there is now also an acute below the knee DVT affecting a left posterior tibial vein.

## 2023-10-05 NOTE — PHYSICAL THERAPY INITIAL EVALUATION ADULT - GENERAL OBSERVATIONS, REHAB EVAL
Pt rec'd semisupine in bed, +drain, +IV lock, +bed alarm, in NAD.  Pt cleared for PT session by MIAN Cortes.

## 2023-10-05 NOTE — PROGRESS NOTE ADULT - PROBLEM SELECTOR PLAN 4
see GOC note above:   DNR/I  surrogate is Marlo Thao  dispo is home hospice continued medical management while in the hospital: no blood draws, fs, injections, esclation of care. family would like to continue PO medications and IV hydration while patient is pending discharge home

## 2023-10-05 NOTE — PROGRESS NOTE ADULT - PROBLEM SELECTOR PLAN 5
will continue to follow for symptom management  please page palliative care if symptoms unmanaged   case discussed with primary team PA and    referral placed for hospice  Can be reached by TEAMS M-F 9-5 Lottie Sanchez Any other time please page [phone] if needed

## 2023-10-05 NOTE — CHART NOTE - NSCHARTNOTEFT_GEN_A_CORE
78 F w/ Stage IV pancreatic cancer c/b biliary obstruction, GIB, malignancy related hypercalcemia a/w Ca 17. Ca now trending down s/p Xgeva, calcitonin and IVF. Pt remains very weak, at times confused and overall not a candidate for DMT. I spoke to pt and family via phone at length. They are in agreement with home hospice services. I tried to reach out to CM on 4M as well as the medicine NP, Moraima and palliative NP Lottie. No one answered. I have left messages for a hospice referral to be placed STAT.     Goal is to discharge pt before the weekend.   Please make hospice referral to [facility]. I will certify and remain as the attending on record. 78 F w/ Stage IV pancreatic cancer c/b biliary obstruction, GIB, malignancy related hypercalcemia a/w Ca 17. Ca now trending down s/p Xgeva, calcitonin and IVF. Pt remains very weak, at times confused and overall not a candidate for DMT. I spoke to pt and family via phone at length.    Goal is to discharge pt before the weekend.   Please make hospice referral to [facility]. I will certify and remain as the attending on record. 78 F w/ Stage IV pancreatic cancer c/b biliary obstruction, GIB, malignancy related hypercalcemia a/w Ca 17. Ca now trending down s/p Xgeva, calcitonin and IVF. Pt remains very weak, at times confused and overall not a candidate for DMT. I spoke to pt and family via phone at length and they are in agreement with home with hospice services.     Goal is to discharge pt before the weekend.   Please make hospice referral to [facility]. I will certify and remain as the attending on record.

## 2023-10-05 NOTE — ADVANCED PRACTICE NURSE CONSULT - REASON FOR CONSULT
Consult to assess patient skin initiated by RN skin injury    History of Present Illness:  Reason for Admission: Hypercalcemia  History of Present Illness:   78F w/ PMHx of stage IV pancreatic cancer C/B biliary obstruction s/p PTC placement, duodenal ulcer w/ bleeding secondary to pancreatic mass invasion was sent in from OP oncologist office (Dr. Kelly Velázquez) for hypercalcemia (Ca 17) in the setting of a few days of fatigue, decreased p.o. intake, abdominal pain, and confusion.  On chart review, patient received Xgeva (Denosumab, 3-day onset of action, 10-day peak onset) today (10/3).  She is also been getting IV fluids with minimal improvement in calcium levels.  She endorses confusion/difficulty concentrating, fatigue, decreased p.o. intake, and nonspecific abdominal pain.  She denies any fever/chills/cough/sore throat, CP, SOB,  symptoms, and recent trauma, recent ingestion of antacids or other high calcium supplements. Of note, chemotherapy was not started patient supposed to get radiotherapy soon.  also reports that patient's biliary drain tubing was nicked with temporary fix in place.  PCP is Dr. Taylor.    In ED, pt HDS afebrile breathing well on RA.  Labs notable for leukocytosis wbc21, normocytic anemia, hypokalemia, hypercalcemia. CTH noncon unremarkable, CXR w/ RLL opacity c/f infection. Pt received calcitonin 4u/kg, CTX/azithro, 2L IVF, KCl repletion, admitted to medicine for further workup.

## 2023-10-05 NOTE — PHYSICAL THERAPY INITIAL EVALUATION ADULT - BALANCE TRAINING, PT EVAL
GOAL: Pt will improve static and dynamic standing balance during functional activities by at least 1 balance grade within 2 weeks.

## 2023-10-05 NOTE — PROGRESS NOTE ADULT - PROBLEM SELECTOR PLAN 2
8a-8a patient used 1x 2.5mg PO Oxycodone  c/w Methadone 2.5mg BID   c/w oxycodone 2.5mg PO Solution q4 hours prn for moderate pain (hold sbp<90, RR<10, obtundation)   c/w oxycodone 5mg PO solution q4 hours prn for severe pain (hold sbp<90, RR<10, obtundation)   bowel regimen while on opioids

## 2023-10-05 NOTE — ADVANCED PRACTICE NURSE CONSULT - ASSESSMENT
arrived on unit, patient was found lying in a low air loss pressure redistribution support surface style bed.  patient  is unable to turn independently and staff assistance x 1 was provided. Once turned,  able view her skin. biliary  drain present   bilateral sacrum/ coccyx / buttock   non-blanchable deep red,   discoloration and hyperpigmentation  with coccyx area partial thickness and tissue loss consistent with deep tissue injury with evolution size approximately  5 cm x 5 cm x 0.1 cm.  present  on admission.

## 2023-10-05 NOTE — PHYSICAL THERAPY INITIAL EVALUATION ADULT - AMBULATION SKILLS, REHAB EVAL
Pt discharged earlier today dx with hypothyroid and bradycardia. Family reports increased lethargy.
independent/needs device

## 2023-10-05 NOTE — PROGRESS NOTE ADULT - PROBLEM SELECTOR PLAN 1
Severe Symptomatic Hypercalcemia, Ca 17.1.   S/p Xegiva (Oct 3) and Calcitonin 4units/kg x 1.      Recs: Severe Symptomatic Hypercalcemia, Ca 17.1.   S/p Xegiva (Oct 3) and Calcitonin 4units/kg x 1  Calcium improving now 11.9  May decrease the rate of fluid and monitor Ca trend    Hypokalemia  Replete K as needed    Thank you for this consult.  Bebeto Barron  Nephrology Fellow  Please contact me on TEAMS  After 5 pm please contact the on-call Fellow.

## 2023-10-05 NOTE — PHYSICAL THERAPY INITIAL EVALUATION ADULT - IMPAIRMENTS FOUND, PT EVAL
aerobic capacity/endurance/arousal, attention, and cognition/decreased midline orientation/gait, locomotion, and balance/gross motor/muscle strength/poor safety awareness

## 2023-10-05 NOTE — PROGRESS NOTE ADULT - CONVERSATION DETAILS
Met with patient's family outside of room. Initially we discussed prognosis and GOC. Marlo elected for Sari to be DNR/I, as he feels that she would not survive, those interventions would severely impact her quality of life, and he knows that if she was able to participate in the conversation those would be her wishes. He was waiting to hear from Dr. Velázquez regarding next steps.   Met with the family again in the afternoon following a conversation with Dr. Velázquez. Based on prognosis and QOL impacted by chemo or RT family is opting to transition to home hospice. Explained difference between home vs inpatient hospice and the quantifications. Discussed the hospice benefit. Marlo would like to continue conservative medical treatment such as po medications as tolerated, IV hydration as tolerated while in the hospital, but would like to discontinue blood draws, fs, and any other painful invasive interventions as they await d/c home. Referral placed for [facility].

## 2023-10-05 NOTE — PHYSICAL THERAPY INITIAL EVALUATION ADULT - IMPAIRED TRANSFERS: SIT/STAND, REHAB EVAL
+pt with poor trunk control while standing/impaired balance/impaired motor control/impaired postural control/decreased strength

## 2023-10-05 NOTE — PROGRESS NOTE ADULT - ATTENDING COMMENTS
77 yo F w/ PMHx of stage IV pancreatic cancer C/B biliary obstruction s/p PTC placement, duodenal ulcer w/ bleeding secondary to pancreatic mass invasion was sent in from OP oncologist office (Dr. Kelly Velázquez) for hypercalcemia (Ca 17) in the setting of a few days of fatigue, decreased p.o. intake, abdominal pain, and confusion.     Hypercalcemia- secondary to malignancy     10/3 night- received 4 IU/kg of Calcitonin and a total of 3 L of iv fluids with improvement in calcium     - decrease fluids to 75 cc/hr   - repeat 1 dose of Calcitonin 4 IU/kg.   - Received denosumab on 10/3.   - check PTH, 25-hydroxy Vitamin D, 1,25 di-hydroxy Vitamin D, SPEP, Serum free light chain assay.     jaqueline worley  nephrology attending   please contact me on TEAMS   Office- [phone]

## 2023-10-05 NOTE — PHYSICAL THERAPY INITIAL EVALUATION ADULT - NSPTDISCHREC_GEN_A_CORE
If pt d/c home, pt would require Home PT, assist with ALL mobility and 24/7 supervision/Sub-acute Rehab

## 2023-10-05 NOTE — ADVANCED PRACTICE NURSE CONSULT - RECOMMEDATIONS
Impression   bilateral sacrum/ coccyx/buttock  deep tissue injury with evolution     Recommendations  1. Bilateral  , sacral / buttocks  deep tissue injury with evolution    Topical therapy- sacral/bilateral buttocks- cleanse w/incontinent cleanser, pat dry & apply Traid paste  twice daily & prn soiling .  monitor    for changes .  2. Elevate heels; apply Complete Cair air fluidized boots; ensure that the soles of the feet are not resting on the foot board of the bed.  3.  Incontinent management - incontinent cleanser, pads,  vonnie care  BID  4. Maintain on an alternating air with low air loss surface   5. Turn & reposition every 2 hr; Use positioning pillow to turn and reposition, soft pillow between bony prominences; continue measures to decrease friction/shear/pressure.  6. Nutrition optimization.  7.  waffle cushion when out of bed to chair .  plan of care reviewed with covering staff MIAN Cortes

## 2023-10-06 NOTE — PROGRESS NOTE ADULT - PROBLEM SELECTOR PLAN 3
following GOC, no escalation of care, no further DMT pt expressing anxiety regarding overall prognosis and progression of disease  started on 0.25mg PO xanax prn q6 hours for anxiety

## 2023-10-06 NOTE — DISCHARGE NOTE PROVIDER - HOSPITAL COURSE
Discharge Summary     Admit Date: 10-03-23  Discharge Date:    Admission diagnoses:   ***  Hypercalcemia        Discharge diagnoses:   ***    Hospital Course:   For full details, please see H&P, progress notes, consult notes and ancillary notes. Briefly, JANETT FUNEZ is a 78y F newly diagnosed pancreatic CA (not  yet on tx ) p/w weakness, black stools and dark urine x 3-4 days, jaundice, and  worsening abdominal pain admitted for pain control and obstructive jaundice.  Patient admitted with hypercalcemia, PNA, transaminitis.  Nephrology consulted, patient s/p IVF,  Xegiva (Oct 3) and Calcitonin 4units/kg x 1. Infection disease  - CXR w/ Right lower lobe opacity possibly infectious in etiology  and CT chest negative for pneumonia. Patient S/p Ctx Azithro in ED. Monitor off antibiotics. Gastroenterology consulted. Patient s/p  MRCP 8/25/23 showed hepatic lesions as large as 2-3 cm as well as pancreatic lesion 4-5 cm. EGD/EUS from 9/5/23 showed duodenal invasion with ulceration. Tissue biopsy consistent w/ pancreatic adenocarcinoma. IR consulted and patient S/p billary drain 9/25, flush biliary drain 5cc q12h . Palliative care [id number] and patient agreed to home with hospice.    On day of discharge, patient is clinically stable with no new exam findings or acute symptoms compared to prior. The patient was seen by the attending physician on the date of discharge and deemed stable and acceptable for discharge. The patient's chronic medical conditions were treated accordingly per the patient's home medication regimen. The patient's medication reconciliation (with changes made to chronic medications), follow up appointments, discharge orders, instructions, and significant lab and diagnostic studies are as noted.     Discharge follow up action items:     1. Follow up with PCP in 1-2 weeks.   2. Follow up labs, path, & imaging ***  3. Medication changes ***  4. On hold medications ***    Patient's ordered code status: DNR/DNI    Patient disposition: Home with hospice services       Discharge Summary     Admit Date: 10-03-23  Discharge Date:    Admission diagnoses:   Pancreatic Cancer  Hypercalcemia        Discharge diagnoses:   Pancreatic cancer Stage IV    Hospital Course:   For full details, please see H&P, progress notes, consult notes and ancillary notes. Briefly, JANETT THAO is a 78y F newly diagnosed pancreatic CA (not  yet on tx ) p/w weakness, black stools and dark urine x 3-4 days, jaundice, and  worsening abdominal pain admitted for pain control and obstructive jaundice.  Patient admitted with hypercalcemia, PNA, transaminitis.  Nephrology consulted, patient s/p IVF,  Xegiva (Oct 3) and Calcitonin 4units/kg x 1. Infection disease  - CXR w/ Right lower lobe opacity possibly infectious in etiology  and CT chest negative for pneumonia. Patient S/p Ctx Azithro in ED. Monitor off antibiotics. Gastroenterology consulted. Patient s/p  MRCP 8/25/23 showed hepatic lesions as large as 2-3 cm as well as pancreatic lesion 4-5 cm. EGD/EUS from 9/5/23 showed duodenal invasion with ulceration. Tissue biopsy consistent w/ pancreatic adenocarcinoma. IR consulted and patient S/p billary drain 9/25, flush biliary drain 5cc q12h . Palliative care [id number] and patient agreed to home with hospice.    On day of discharge, patient is clinically stable with no new exam findings or acute symptoms compared to prior. The patient was seen by the attending physician on the date of discharge and deemed stable and acceptable for discharge. The patient's chronic medical conditions were treated accordingly per the patient's home medication regimen. The patient's medication reconciliation (with changes made to chronic medications), follow up appointments, discharge orders, instructions, and significant lab and diagnostic studies are as noted.     Discharge follow up action items:     1. Follow up with PCP in 1-2 weeks.   2. Follow up labs, path, & imaging   3. Medication changes as comfort care    Problem/Plan - 1:  ·  Problem: Functional quadriplegia.   ·  Plan: ppsv 30%: pt needs assistance with all ADLs and care.    Problem/Plan - 2:  ·  Problem: Abdominal pain.   ·  Plan: 8a-8a patient used 1x 2.5mg PO Oxycodone  c/w Methadone 2.5mg BID   c/w oxycodone 2.5mg PO Solution q4 hours prn for moderate pain (hold sbp<90, RR<10, obtundation)   c/w oxycodone 5mg PO solution q4 hours prn for severe pain (hold sbp<90, RR<10, obtundation)   bowel regimen while on opioids.    Problem/Plan - 3:  ·  Problem: Pancreatic cancer.   ·  Plan: following GOC, no escalation of care, no further DMT.    Problem/Plan - 4:  ·  Problem: Advanced care planning/counseling discussion.   ·  Plan: see GOC note above:   DNR/I  surrogate is Marol Thao  dispo is home hospice continued medical management while in the hospital: no blood draws, fs, injections, esclation of care. family would like to continue PO medications and IV hydration while patient is pending discharge home.      Patient's ordered code status: DNR/DNI    Patient disposition: Home with hospice services    D/W Dr. Casiano

## 2023-10-06 NOTE — DISCHARGE NOTE NURSING/CASE MANAGEMENT/SOCIAL WORK - NSDCPEFALRISK_GEN_ALL_CORE
For information on Fall & Injury Prevention, visit: [email or web address] OR  [email or web address] OR  https://www.cdc.gov/steadi/patient.html

## 2023-10-06 NOTE — PROGRESS NOTE ADULT - TIME BILLING
Symptom assessment and management, supportive counseling, coordination of care
Symptom assessment and management, supportive counseling, coordination of care

## 2023-10-06 NOTE — PROGRESS NOTE ADULT - PROBLEM SELECTOR PLAN 6
will continue to follow for symptom management  please page palliative care if symptoms unmanaged   case discussed with primary team PA and    referral placed for hospice  Can be reached by TEAMS M-F 9-5 Lottie Sanchez Any other time please page [phone] if needed

## 2023-10-06 NOTE — CHART NOTE - NSCHARTNOTEFT_GEN_A_CORE
Per recent GOC pt being planned for home hospice. Nephrology will sign off. Please recall as needed.       Thank you for this consult.  Bebeto Barron  Nephrology Fellow  Please contact me on TEAMS  After 5 pm please contact the on-call Fellow.

## 2023-10-06 NOTE — PROGRESS NOTE ADULT - PROVIDER SPECIALTY LIST ADULT
Infectious Disease
Internal Medicine
Infectious Disease
Internal Medicine
Internal Medicine
Nephrology-Cardiorenal
Palliative Care
Palliative Care

## 2023-10-06 NOTE — PROVIDER CONTACT NOTE (OTHER) - BACKGROUND
Family at bedside. Admitting Dx Hypercalcemia. Stage IV pancreatic cancer and awaiting home hospice.
Pt admitted Hypercalemia and low potassium.
Pt was admitted for Hypercalcemia.

## 2023-10-06 NOTE — PROVIDER CONTACT NOTE (OTHER) - ACTION/TREATMENT ORDERED:
No further actions.
Provider notified and aware. STAT dose Atenolol 25 mg ordered.
Provider made aware. No new interventions.

## 2023-10-06 NOTE — PROGRESS NOTE ADULT - ASSESSMENT
78y F newly dx'd pancreatic CA (not  yet on tx ) p/w weakness, black stools and dark urine x 3-4 days, jaundice, and  worsening abdominal pain admitted for pain control and obstructive jaundice.     Plan:    # Hypercalcemia:   - Likely iso malignancy  - Serial BMP's  - C/w IVF's per Renal  - S/p Xegiva (Oct 3) and Calcitonin 4units/kg x 1  - Renal following    # Pneumonia: No Pneumonia per ID  - CXR w/ Right lower lobe opacity possibly infectious in etiology  - CT w/ no pneumonia   - S/p Ctx Azithro in ED. Monitor off abx  - ID following    # Pancreas cancer w/ hepatic mets:  - + Transaminitis  - MRCP 8/25/23 showed hepatic lesions as large as 2-3 cm as well as pancreatic lesion 4-5 cm. EGD/EUS from 9/5/23 showed duodenal invasion with ulceration  - Bx consistent w/ pancreatic adenocarcinoma  - C/w PPI  - Trend CBC's and LFT's  - Pain control  - Diet per GI rec's  - S/p billary drain 9/25, flush biliary drain 5cc q12h  - Monitor drain output: strict outputs  - Was planned for outpatient RT at [facility] on the TrueBeam 1 machine, 5 fractions, to the pancreas site, to go from 10/4 to 10/11.  - Planned for Home with hospice services  - GI and Heme/ Onc and IR following    # Recent + DVT:  - Doppler: In addition to the previously identified left soleal vein DVT, there is   now also an acute below the knee DVT affecting a left posterior tibial   vein.  - C/w current meds/ Pain control  - C/w Lovenox sq    # HTN/ HLD:  - Trend BP's  - C/w current meds    # Dm2:  - HgbA1c 7.7  - Continue to monitor blood glucose levels  - Sliding Scale    # Anxiety  - C/w current meds    # DVT ppx:  - IPC's  - Lovenox sq    Palliative following. Discharge planning home with hospice services    [facility]  [phone]  
77 yo F w/ PMHx of stage IV pancreatic cancer C/B biliary obstruction s/p PTC placement, duodenal ulcer w/ bleeding secondary to pancreatic mass invasion pw symptomatic severe hypercalcemia (Ca 17). 
78y F newly dx'd pancreatic CA (not  yet on tx ) p/w weakness, black stools and dark urine x 3-4 days, jaundice, and  worsening abdominal pain admitted for pain control and obstructive jaundice.     Plan:    # Hypercalcemia:  - Likely iso malignancy  - Serial BMP's  - C/w IVF's  - S/p calcitonin, bisphosphonate  - Renal following    # Pneumonia:  - CXR w/ Right lower lobe opacity possibly infectious in etiology  - S/p Ctx Azithro   -- ID consult pending    # Pancreas cancer w/ hepatic mets:  - + Transaminitis  - MRCP 8/25/23 showed hepatic lesions as large as 2-3 cm as well as pancreatic lesion 4-5 cm. EGD/EUS from 9/5/23 showed duodenal invasion with ulceration  - Bx consistent w/ pancreatic adenocarcinoma  - C/w PPI  - Trend CBC's and LFT's  - Pain control  - Diet per GI rec's  - S/p billary drain 9/25, flush biliary drain 5cc q12h  - Monitor drain output: strict outputs  - Was planned for outpatient RT at [facility] on the TrueBeam 1 machine, 5 fractions, to the pancreas site, to go from 10/4 to 10/11.  - GI and Heme/ Onc and IR following    # Recent + DVT:  - C/w current meds/ Pain control  - C/w Lovenox sq    # HTN/ HLD:  - Trend BP's  - C/w current meds    # Dm2:  - HgbA1c 7.7  - Continue to monitor blood glucose levels  - Sliding Scale    # Anxiety  - C/w current meds    # DVT ppx:  - IPC's  - Lovenox sq    [facility]    
78y F newly dx'd pancreatic CA (not  yet on tx ) p/w weakness, black stools and dark urine x 3-4 days, jaundice, and  worsening abdominal pain admitted for pain control and obstructive jaundice.     Plan:    # Hypercalcemia: Calcium Ionized: 1.72 this AM  - Likely iso malignancy  - Serial BMP's  - C/w IVF's per Renal  - S/p calcitonin, bisphosphonate  - Renal following    # Pneumonia: No Pneumonia per ID  - CXR w/ Right lower lobe opacity possibly infectious in etiology  - CT w/ no pneumonia   - S/p Ctx Azithro in ED. Monitor off abx  - ID following    # Pancreas cancer w/ hepatic mets:  - + Transaminitis  - MRCP 8/25/23 showed hepatic lesions as large as 2-3 cm as well as pancreatic lesion 4-5 cm. EGD/EUS from 9/5/23 showed duodenal invasion with ulceration  - Bx consistent w/ pancreatic adenocarcinoma  - C/w PPI  - Trend CBC's and LFT's  - Pain control  - Diet per GI rec's  - S/p billary drain 9/25, flush biliary drain 5cc q12h  - Monitor drain output: strict outputs  - Was planned for outpatient RT at [facility] on the TrueBeam 1 machine, 5 fractions, to the pancreas site, to go from 10/4 to 10/11.  - GI and Heme/ Onc and IR following    # Recent + DVT:  - Doppler: In addition to the previously identified left soleal vein DVT, there is   now also an acute below the knee DVT affecting a left posterior tibial   vein.  - C/w current meds/ Pain control  - C/w Lovenox sq    # HTN/ HLD:  - Trend BP's  - C/w current meds    # Dm2:  - HgbA1c 7.7  - Continue to monitor blood glucose levels  - Sliding Scale    # Anxiety  - C/w current meds    # DVT ppx:  - IPC's  - Lovenox sq    Palliative following    [facility]    
78y F newly dx'd pancreatic CA (not  yet on tx ) p/w weakness, black stools and dark urine x 3-4 days, jaundice, and  worsening abdominal pain admitted for pain control and obstructive jaundice. Palliative care consulted for GOC and assistance with symptom management 
Patient is a 78 year old female with PMH of stage IV pancreatic cancer complicated by biliary obstruction s/p PTC placement, duodenal ulcer with bleeding secondary to pancreatic mass invasion was sent in from [facility] oncologist office (Dr. Kelly Velázquez) for hypercalcemia (Ca 17) in the setting of a few days of fatigue, decreased p.o. intake, abdominal pain, and confusion.  On chart review, patient received Xgeva (Denosumab, 3-day onset of action, 10-day peak onset) today (10/3).  She is also been getting IV fluids with minimal improvement in calcium levels.  She endorses confusion/difficulty concentrating, fatigue, decreased p.o. intake, and nonspecific abdominal pain with black stools and dark urine for 3-4 days and was admitted for pain control for worsening abdominal pain, obstructive jaundice, hypercalcemia and found to have possible R sided pneumonia.     Atelectasis, no pneumonia   Leukocytosis - downtrended    - afebrile, leukocytosis - may be multifactorial, saturating well on RA   - CXR with RLL opacity possibly infectious but patient with no significant resp symptoms   - CT chest without contrast has atelectasis, no pneumonia; has new L apical 0.4cm nodule which could be mets    - s/p ceftriaxone and azithromycin in the ER   Hypercalcemia likely in setting of pancreatic cancer with hepatic mets, s/p biliary drain 9/25   - Nephrology, Heme/Onc and GI following   Acute L posterior tibial vein DVT with known L soleal vein DVT     Recommendations   Follow blood cultures - NGTD x2   Continue off antibiotics   Drain care  Monitor temps/CBC  Continue care per primary team     Over the weekend Dr. Vanessa Staley will be covering for our group. If you have any questions, concerns or new micro data, please reach out to them at [phone].    Kirsten Cespedes M.D.  [facility], Division of Infectious Diseases  [phone]  After 5pm on weekdays and all day on weekends - please call [phone]      
Patient is a 78 year old female with PMH of stage IV pancreatic cancer complicated by biliary obstruction s/p PTC placement, duodenal ulcer with bleeding secondary to pancreatic mass invasion was sent in from OP oncologist office (Dr. Kelly Velázquez) for hypercalcemia (Ca 17) in the setting of a few days of fatigue, decreased p.o. intake, abdominal pain, and confusion.  On chart review, patient received Xgeva (Denosumab, 3-day onset of action, 10-day peak onset) today (10/3).  She is also been getting IV fluids with minimal improvement in calcium levels.  She endorses confusion/difficulty concentrating, fatigue, decreased p.o. intake, and nonspecific abdominal pain with black stools and dark urine for 3-4 days and was admitted for pain control for worsening abdominal pain, obstructive jaundice, hypercalcemia and found to have possible R sided pneumonia.     Ruled out R sided pneumonia, +atelectasis   Leukocytosis - downtrended    - afebrile, leukocytosis - may be multifactorial, saturating well on RA   - CXR with RLL opacity possibly infectious but patient with no significant resp symptoms   - CT chest without contrast has atelectasis, no pneumonia; has new L apical 0.4cm nodule which could be mets    - s/p ceftriaxone and azithromycin in the ER   Hypercalcemia likely in setting of pancreatic cancer with hepatic mets, s/p biliary drain 9/25   - Nephrology, Heme/Onc and GI following     Recommendations   Follow blood cultures - NGTD x2   Continue off antibiotics   Drain care  Monitor temps/CBC  Continue care per primary team     D/w NP Moraima Cespedes M.D.  [facility], Division of Infectious Diseases  [phone]  After 5pm on weekdays and all day on weekends - please call [phone]      
78y F newly dx'd pancreatic CA (not  yet on tx ) p/w weakness, black stools and dark urine x 3-4 days, jaundice, and  worsening abdominal pain admitted for pain control and obstructive jaundice. Palliative care consulted for GOC and assistance with symptom management

## 2023-10-06 NOTE — DISCHARGE NOTE PROVIDER - NSDCMRMEDTOKEN_GEN_ALL_CORE_FT
atenolol 25 mg oral tablet: 1 tab(s) orally once a day  DULoxetine 20 mg oral delayed release capsule: 1 cap(s) orally once a day  gabapentin 100 mg oral capsule: 1 cap(s) orally 3 times a day  metoclopramide 10 mg oral tablet, disintegratin tab(s) orally 4 times a day as needed for  nausea  omeprazole 40 mg oral delayed release capsule: 1 cap(s) orally 2 times a day  oxyCODONE 5 mg oral tablet: 1 tab(s) orally 4 times a day as needed for  pain  polyethylene glycol 3350 oral powder for reconstitution: 17 gram(s) orally once a day  senna leaf extract oral tablet: 2 tab(s) orally once a day (at bedtime)

## 2023-10-06 NOTE — DISCHARGE NOTE NURSING/CASE MANAGEMENT/SOCIAL WORK - PATIENT PORTAL LINK FT
You can access the FollowMyHealth Patient Portal offered by [facility] by registering at the following website: [email or web address] By joining [email or web address] FollowMyHealth portal, you will also be able to view your health information using other applications (apps) compatible with our system.

## 2023-10-06 NOTE — PROVIDER CONTACT NOTE (OTHER) - SITUATION
Pt. BP @ 183/82 and feeling asymptomatic. Family at bedside. States pt has not received any dose of BP medication.
Pt and family refuse vital signs and FS to be taken.
Pt family refused FS

## 2023-10-06 NOTE — PROVIDER CONTACT NOTE (OTHER) - ASSESSMENT
Pt is AAOx2-3. VSS on RA; denies CP, SOB.
PT AOx3, on tele NSR 60-80.
VSS except BP @ 183/82. Pt denies n/v/ or dizziness.

## 2023-10-06 NOTE — PROGRESS NOTE ADULT - SUBJECTIVE AND OBJECTIVE BOX
[facility]  CHLOÉ Colmenares Y. Patel, S. Shah, G. Kishore  [phone]  ([phone] - weekdays after 5pm and weekends)    Name: JANETT FUNEZ  Age/Gender: 78y Female  MRN: [id number]    Interval History:  Patient seen and examined this morning.   Resting comfortably, has abd pain, no n/v/d.  Denies fever or any new complaints  Notes reviewed. Afebrile.   Allergies: No Known Allergies      Objective:  Vitals:   T(F): 97.6 (10-05-23 @ 18:16), Max: 97.9 (10-05-23 @ 11:36)  HR: 69 (10-05-23 @ 19:18) (65 - 71)  BP: 138/65 (10-05-23 @ 19:18) (138/65 - 159/82)  RR: 18 (10-05-23 @ 19:18) (18 - 18)  SpO2: 95% (10-05-23 @ 19:18) (95% - 96%)  Physical Examination:  General: no acute distress  HEENT: NC/AT, EOMI, anicteric, neck supple  Cardio: S1, S2 present, normal rate  Resp: clear to auscultation bilaterally  Abd: soft, mild TTP, ND, +drain  Neuro: awake, alert, answers/follows  Ext: no edema, no cyanosis  Skin: warm, dry, no visible rash    Laboratory Studies:  CBC:                       9.5    18.34 )-----------( 251      ( 05 Oct 2023 05:59 )             28.3     WBC Trend:  18.34 10-05-23 @ 05:59  22.07 10-04-23 @ 07:02  21.12 10-03-23 @ 20:04  21.50 10-02-23 @ 14:15  20.98 10-02-23 @ 11:21    CMP: 10-05    135  |  100  |  22  ----------------------------<  104<H>  3.4<L>   |  24  |  0.72    Ca    11.9<H>      05 Oct 2023 05:59  Phos  2.2     10-05  Mg     1.6     10-05      Creatinine: 0.72 mg/dL (10-05-23 @ 05:59)  Creatinine: 0.74 mg/dL (10-05-23 @ 01:25)  Creatinine: 0.70 mg/dL (10-04-23 @ 18:12)  Creatinine: 0.74 mg/dL (10-04-23 @ 12:55)  Creatinine: 0.73 mg/dL (10-04-23 @ 07:02)  Creatinine: 0.80 mg/dL (10-03-23 @ 23:08)  Creatinine: 0.85 mg/dL (10-03-23 @ 20:04)  Creatinine: 0.96 mg/dL (10-03-23 @ 16:18)  Creatinine: 0.78 mg/dL (10-02-23 @ 11:49)    Microbiology: reviewed   Culture - Blood (collected 10-04-23 @ 06:22)  Source: .Blood Blood-Peripheral  Preliminary Report (10-05-23 @ 11:01):    No growth at 24 hours    Culture - Blood (collected 10-04-23 @ 06:12)  Source: .Blood Blood-Peripheral  Preliminary Report (10-05-23 @ 11:01):    No growth at 24 hours    Radiology: reviewed   < from: [facility] Dup[facility] Lower Ext Vein Scan, Left (10.04.23 @ 16:13) >  IMPRESSION:    In addition to the previously identified left soleal vein DVT, there is   now also an acute below the knee DVT affecting a left posterior tibial   vein.    < end of copied text >  < from: CT Chest No Cont (10.04.23 @ 11:02) >  IMPRESSION:.    No evidence of pneumonia.    Left apical 0.4 cm nodule is new compared to 9/16/2023; indeterminate,   but could represent metastasis.    Hepatic lesion/metastasis appear progressed in size compared to 9/16/2023.    < end of copied text >    Medications:  atenolol  Tablet 25 milliGRAM(s) Oral daily  chlorhexidine 2% Cloths 1 Application(s) Topical <User Schedule>  dextrose 5%. 1000 milliLiter(s) IV Continuous <Continuous>  dextrose 5%. 1000 milliLiter(s) IV Continuous <Continuous>  dextrose 50% Injectable 25 Gram(s) IV Push once  dextrose 50% Injectable 25 Gram(s) IV Push once  dextrose 50% Injectable 12.5 Gram(s) IV Push once  dextrose Oral Gel 15 Gram(s) Oral once PRN  DULoxetine 20 milliGRAM(s) Oral daily  enoxaparin Injectable 60 milliGRAM(s) SubCutaneous every 12 hours  gabapentin 100 milliGRAM(s) Oral three times a day  glucagon  Injectable 1 milliGRAM(s) IntraMuscular once  insulin lispro (ADMELOG) corrective regimen sliding scale   SubCutaneous three times a day before meals  insulin lispro (ADMELOG) corrective regimen sliding scale   SubCutaneous at bedtime  methadone    Tablet 2.5 milliGRAM(s) Oral every 12 hours  oxyCODONE    IR 2.5 milliGRAM(s) Oral every 4 hours PRN  oxyCODONE    IR 5 milliGRAM(s) Oral every 4 hours PRN  pantoprazole    Tablet 40 milliGRAM(s) Oral two times a day  polyethylene glycol 3350 17 Gram(s) Oral daily PRN  senna 2 Tablet(s) Oral at bedtime    Current Antimicrobials:    Prior/Completed Antimicrobials:  azithromycin  IVPB  cefTRIAXone   IVPB  
SUBJECTIVE / OVERNIGHT EVENTS:      Patient seen and examined at bedside in ED [facility]. Resting in bed. Mild Abd discomfort      --------------------------------------------------------------------------------------------  LABS:                        10.0   22.07 )-----------( 277      ( 04 Oct 2023 07:02 )             29.6     10-04    136  |  99  |  22  ----------------------------<  123<H>  3.8   |  27  |  0.73    Ca    14.0<HH>      04 Oct 2023 07:02  Phos  2.1     10-04  Mg     1.6     10-04    TPro  6.2  /  Alb  3.0<L>  /  TBili  2.0<H>  /  DBili  x   /  AST  48<H>  /  ALT  73<H>  /  AlkPhos  271<H>  10-04    PT/INR - ( 03 Oct 2023 21:13 )   PT: 15.3 sec;   INR: 1.40 ratio         PTT - ( 03 Oct 2023 21:13 )  PTT:25.5 sec  CAPILLARY BLOOD GLUCOSE      POCT Blood Glucose.: 121 mg/dL (04 Oct 2023 08:09)  POCT Blood Glucose.: 178 mg/dL (04 Oct 2023 01:02)        Urinalysis Basic - ( 04 Oct 2023 07:02 )    Color: x / Appearance: x / SG: x / pH: x  Gluc: 123 mg/dL / Ketone: x  / Bili: x / Urobili: x   Blood: x / Protein: x / Nitrite: x   Leuk Esterase: x / RBC: x / WBC x   Sq Epi: x / Non Sq Epi: x / Bacteria: x        RADIOLOGY & ADDITIONAL TESTS:< from: Xray Chest 1 View- PORTABLE-Urgent (Xray Chest 1 View- PORTABLE-Urgent .) (10.03.23 @ 20:30) >  IMPRESSION:  Right lower lobe opacity possibly infectious in etiology.    --- End of Report ---    < end of copied text >  < from: CT Head No Cont (10.03.23 @ 21:35) >  IMPRESSION:  No acute intracranial findings.    < end of copied text >      Imaging Personally Reviewed:  [x] YES  [ ] NO    Consultant(s) Notes Reviewed:  [x] YES  [ ] NO    MEDICATIONS  (STANDING):  atenolol  Tablet 25 milliGRAM(s) Oral daily  dextrose 5%. 1000 milliLiter(s) (100 mL/Hr) IV Continuous <Continuous>  dextrose 5%. 1000 milliLiter(s) (50 mL/Hr) IV Continuous <Continuous>  dextrose 50% Injectable 12.5 Gram(s) IV Push once  dextrose 50% Injectable 25 Gram(s) IV Push once  dextrose 50% Injectable 25 Gram(s) IV Push once  DULoxetine 20 milliGRAM(s) Oral daily  gabapentin 100 milliGRAM(s) Oral three times a day  glucagon  Injectable 1 milliGRAM(s) IntraMuscular once  insulin lispro (ADMELOG) corrective regimen sliding scale   SubCutaneous three times a day before meals  insulin lispro (ADMELOG) corrective regimen sliding scale   SubCutaneous at bedtime  lactated ringers. 500 milliLiter(s) (125 mL/Hr) IV Continuous <Continuous>  pantoprazole    Tablet 40 milliGRAM(s) Oral two times a day  potassium phosphate IVPB 15 milliMole(s) IV Intermittent once  senna 2 Tablet(s) Oral at bedtime    MEDICATIONS  (PRN):  dextrose Oral Gel 15 Gram(s) Oral once PRN Blood Glucose LESS THAN 70 milliGRAM(s)/deciliter  oxyCODONE    IR 2.5 milliGRAM(s) Oral every 4 hours PRN Moderate Pain (4 - 6)  oxyCODONE    IR 5 milliGRAM(s) Oral every 4 hours PRN Severe Pain (7 - 10)  polyethylene glycol 3350 17 Gram(s) Oral daily PRN Constipation      Care Discussed with Consultants/Other Providers [x] YES  [ ] NO    Vital Signs Last 24 Hrs  T(C): 36.6 (04 Oct 2023 04:37), Max: 36.9 (03 Oct 2023 16:01)  T(F): 97.9 (04 Oct 2023 04:37), Max: 98.4 (03 Oct 2023 16:01)  HR: 57 (04 Oct 2023 05:28) (57 - 72)  BP: 174/87 (04 Oct 2023 05:28) (131/75 - 183/82)  BP(mean): --  RR: 18 (04 Oct 2023 04:37) (16 - 18)  SpO2: 93% (04 Oct 2023 04:37) (93% - 99%)    Parameters below as of 04 Oct 2023 04:37  Patient On (Oxygen Delivery Method): room air      I&O's Summary    03 Oct 2023 07:01  -  04 Oct 2023 07:00  --------------------------------------------------------  IN: 0 mL / OUT: 300 mL / NET: -300 mL              PHYSICAL EXAM:  GENERAL: NAD, AAOx3. Frail  HEAD:  Atraumatic, Normocephalic  EYES: EOMI; conjunctiva and sclera clear  NECK: Supple, No JVD, No LAD  CHEST/LUNG: Diminished; No wheezes, rales or rhonci   HEART: Regular rate and rhythm; No murmurs, rubs, or gallops  ABDOMEN: Soft, Nontender, Nondistended; Bowel sounds present. + drains  EXTREMITIES:  2+ Peripheral Pulses, No clubbing, cyanosis, or edema  SKIN: No rashes or lesions        
SUBJECTIVE / OVERNIGHT EVENTS:    Patient seen and examined at bedside. No events noted overnight. Resting comfortably in bed. Appears withdrawn    --------------------------------------------------------------------------------------------  LABS:                        9.5    18.34 )-----------( 251      ( 05 Oct 2023 05:59 )             28.3     10-05    135  |  100  |  22  ----------------------------<  104<H>  3.4<L>   |  24  |  0.72    Ca    11.9<H>      05 Oct 2023 05:59  Phos  2.2     10-05  Mg     1.6     10-05        CAPILLARY BLOOD GLUCOSE      POCT Blood Glucose.: 107 mg/dL (06 Oct 2023 08:17)  POCT Blood Glucose.: 165 mg/dL (05 Oct 2023 12:19)        Urinalysis Basic - ( 05 Oct 2023 05:59 )    Color: x / Appearance: x / SG: x / pH: x  Gluc: 104 mg/dL / Ketone: x  / Bili: x / Urobili: x   Blood: x / Protein: x / Nitrite: x   Leuk Esterase: x / RBC: x / WBC x   Sq Epi: x / Non Sq Epi: x / Bacteria: x        RADIOLOGY & ADDITIONAL TESTS:      Imaging Personally Reviewed:  [x] YES  [ ] NO    Consultant(s) Notes Reviewed:  [x] YES  [ ] NO    MEDICATIONS  (STANDING):  atenolol  Tablet 25 milliGRAM(s) Oral daily  chlorhexidine 2% Cloths 1 Application(s) Topical <User Schedule>  dextrose 5%. 1000 milliLiter(s) (50 mL/Hr) IV Continuous <Continuous>  dextrose 5%. 1000 milliLiter(s) (100 mL/Hr) IV Continuous <Continuous>  dextrose 50% Injectable 25 Gram(s) IV Push once  dextrose 50% Injectable 25 Gram(s) IV Push once  dextrose 50% Injectable 12.5 Gram(s) IV Push once  DULoxetine 20 milliGRAM(s) Oral daily  enoxaparin Injectable 60 milliGRAM(s) SubCutaneous every 12 hours  gabapentin 100 milliGRAM(s) Oral three times a day  glucagon  Injectable 1 milliGRAM(s) IntraMuscular once  insulin lispro (ADMELOG) corrective regimen sliding scale   SubCutaneous three times a day before meals  insulin lispro (ADMELOG) corrective regimen sliding scale   SubCutaneous at bedtime  methadone    Tablet 2.5 milliGRAM(s) Oral every 12 hours  pantoprazole    Tablet 40 milliGRAM(s) Oral two times a day  senna 2 Tablet(s) Oral at bedtime    MEDICATIONS  (PRN):  dextrose Oral Gel 15 Gram(s) Oral once PRN Blood Glucose LESS THAN 70 milliGRAM(s)/deciliter  oxyCODONE    IR 5 milliGRAM(s) Oral every 4 hours PRN Severe Pain (7 - 10)  oxyCODONE    IR 2.5 milliGRAM(s) Oral every 4 hours PRN Moderate Pain (4 - 6)  polyethylene glycol 3350 17 Gram(s) Oral daily PRN Constipation      Care Discussed with Consultants/Other Providers [x] YES  [ ] NO    Vital Signs Last 24 Hrs  T(C): 36.4 (05 Oct 2023 18:16), Max: 36.6 (05 Oct 2023 11:36)  T(F): 97.6 (05 Oct 2023 18:16), Max: 97.9 (05 Oct 2023 11:36)  HR: 69 (05 Oct 2023 19:18) (65 - 71)  BP: 138/65 (05 Oct 2023 19:18) (138/65 - 159/82)  BP(mean): --  RR: 18 (05 Oct 2023 19:18) (18 - 18)  SpO2: 95% (05 Oct 2023 19:18) (95% - 96%)    Parameters below as of 05 Oct 2023 19:18  Patient On (Oxygen Delivery Method): room air      I&O's Summary    05 Oct 2023 07:01  -  06 Oct 2023 07:00  --------------------------------------------------------  IN: 0 mL / OUT: 685 mL / NET: -685 mL      PHYSICAL EXAM:  GENERAL: NAD, AAOx3. Frail  HEAD:  Atraumatic, Normocephalic  EYES: EOMI; conjunctiva and sclera clear  NECK: Supple, No JVD, No LAD  CHEST/LUNG: Diminished; No wheezes, rales or rhonci   HEART: Regular rate and rhythm; No murmurs, rubs, or gallops  ABDOMEN: Soft, Nontender, Nondistended; Bowel sounds present. + drains  EXTREMITIES:  2+ Peripheral Pulses, No clubbing, cyanosis, or edema  SKIN: No rashes or lesions  
[facility]  CHLOÉ Colmenares Y. Patel, S. Shah, G. Kishore  [phone]  ([phone] - weekdays after 5pm and weekends)    Name: JANETT FUNEZ  Age/Gender: 78y Female  MRN: [id number]    Interval History:  Patient seen and examined this morning.   Resting comfortably, denies pain.   Notes reviewed. Per RN, drain output ~250ml.  Afebrile.   Allergies: No Known Allergies      Objective:  Vitals:   T(F): 97.9 (10-05-23 @ 11:36), Max: 98.1 (10-04-23 @ 21:12)  HR: 67 (10-05-23 @ 11:36) (65 - 73)  BP: 147/67 (10-05-23 @ 11:36) (147/67 - 174/82)  RR: 18 (10-05-23 @ 11:36) (18 - 18)  SpO2: 96% (10-05-23 @ 11:36) (94% - 96%)  Physical Examination:  General: no acute distress  HEENT: NC/AT, EOMI, anicteric, neck supple  Lungs: decreased breath sounds b/l, no rales/wheezes  Heart: S1, S2 present, RRR. No murmur, rub or gallop.  Abdomen: Soft. ND, mild TTP, L sided drain  Neuro: AAOx3, no obvious focal deficits   Extremities: No cyanosis or clubbing. No edema.   Skin: Warm. Dry. Good turgor. No visible rash.   Psychiatric: Appropriate affect and mood for situation.   Lines: PIV      Laboratory Studies:  CBC:                       9.5    18.34 )-----------( 251      ( 05 Oct 2023 05:59 )             28.3     WBC Trend:  18.34 10-05-23 @ 05:59  22.07 10-04-23 @ 07:02  21.12 10-03-23 @ 20:04  21.50 10-02-23 @ 14:15  20.98 10-02-23 @ 11:21    CMP: 10-05    135  |  100  |  22  ----------------------------<  104<H>  3.4<L>   |  24  |  0.72    Ca    11.9<H>      05 Oct 2023 05:59  Phos  2.2     10-05  Mg     1.6     10-05    TPro  6.2  /  Alb  3.0<L>  /  TBili  2.0<H>  /  DBili  x   /  AST  48<H>  /  ALT  73<H>  /  AlkPhos  271<H>  10-04    Creatinine: 0.72 mg/dL (10-05-23 @ 05:59)  Creatinine: 0.74 mg/dL (10-05-23 @ 01:25)  Creatinine: 0.70 mg/dL (10-04-23 @ 18:12)  Creatinine: 0.74 mg/dL (10-04-23 @ 12:55)  Creatinine: 0.73 mg/dL (10-04-23 @ 07:02)  Creatinine: 0.80 mg/dL (10-03-23 @ 23:08)  Creatinine: 0.85 mg/dL (10-03-23 @ 20:04)  Creatinine: 0.96 mg/dL (10-03-23 @ 16:18)  Creatinine: 0.78 mg/dL (10-02-23 @ 11:49)    LIVER FUNCTIONS - ( 04 Oct 2023 07:02 )  Alb: 3.0 g/dL / Pro: 6.2 g/dL / ALK PHOS: 271 U/L / ALT: 73 U/L / AST: 48 U/L / GGT: x           Microbiology: reviewed   Culture - Blood (collected 10-04-23 @ 06:22)  Source: .Blood Blood-Peripheral  Preliminary Report (10-05-23 @ 11:01):    No growth at 24 hours    Culture - Blood (collected 10-04-23 @ 06:12)  Source: .Blood Blood-Peripheral  Preliminary Report (10-05-23 @ 11:01):    No growth at 24 hours    Radiology: reviewed     Medications:  atenolol  Tablet 25 milliGRAM(s) Oral daily  chlorhexidine 2% Cloths 1 Application(s) Topical <User Schedule>  dextrose 5%. 1000 milliLiter(s) IV Continuous <Continuous>  dextrose 5%. 1000 milliLiter(s) IV Continuous <Continuous>  dextrose 50% Injectable 25 Gram(s) IV Push once  dextrose 50% Injectable 25 Gram(s) IV Push once  dextrose 50% Injectable 12.5 Gram(s) IV Push once  dextrose Oral Gel 15 Gram(s) Oral once PRN  DULoxetine 20 milliGRAM(s) Oral daily  enoxaparin Injectable 60 milliGRAM(s) SubCutaneous every 12 hours  gabapentin 100 milliGRAM(s) Oral three times a day  glucagon  Injectable 1 milliGRAM(s) IntraMuscular once  insulin lispro (ADMELOG) corrective regimen sliding scale   SubCutaneous three times a day before meals  insulin lispro (ADMELOG) corrective regimen sliding scale   SubCutaneous at bedtime  methadone    Tablet 2.5 milliGRAM(s) Oral every 12 hours  oxyCODONE    IR 5 milliGRAM(s) Oral every 4 hours PRN  oxyCODONE    IR 2.5 milliGRAM(s) Oral every 4 hours PRN  pantoprazole    Tablet 40 milliGRAM(s) Oral two times a day  polyethylene glycol 3350 17 Gram(s) Oral daily PRN  senna 2 Tablet(s) Oral at bedtime    Current Antimicrobials:    Prior/Completed Antimicrobials:  azithromycin  IVPB  cefTRIAXone   IVPB  
SUBJECTIVE / OVERNIGHT EVENTS:    Patient seen and examined at bedside. Resting in bed    --------------------------------------------------------------------------------------------  LABS:                        9.5    18.34 )-----------( 251      ( 05 Oct 2023 05:59 )             28.3     10-05    135  |  100  |  22  ----------------------------<  104<H>  3.4<L>   |  24  |  0.72    Ca    11.9<H>      05 Oct 2023 05:59  Phos  2.2     10-05  Mg     1.6     10-05    TPro  6.2  /  Alb  3.0<L>  /  TBili  2.0<H>  /  DBili  x   /  AST  48<H>  /  ALT  73<H>  /  AlkPhos  271<H>  10-04    PT/INR - ( 03 Oct 2023 21:13 )   PT: 15.3 sec;   INR: 1.40 ratio         PTT - ( 03 Oct 2023 21:13 )  PTT:25.5 sec  CAPILLARY BLOOD GLUCOSE      POCT Blood Glucose.: 117 mg/dL (05 Oct 2023 08:09)  POCT Blood Glucose.: 110 mg/dL (05 Oct 2023 00:36)  POCT Blood Glucose.: 87 mg/dL (04 Oct 2023 22:12)  POCT Blood Glucose.: 106 mg/dL (04 Oct 2023 17:19)  POCT Blood Glucose.: 100 mg/dL (04 Oct 2023 11:24)        Urinalysis Basic - ( 05 Oct 2023 05:59 )    Color: x / Appearance: x / SG: x / pH: x  Gluc: 104 mg/dL / Ketone: x  / Bili: x / Urobili: x   Blood: x / Protein: x / Nitrite: x   Leuk Esterase: x / RBC: x / WBC x   Sq Epi: x / Non Sq Epi: x / Bacteria: x        RADIOLOGY & ADDITIONAL TESTS: < from: CT Chest No Cont (10.04.23 @ 11:02) >  IMPRESSION:.    No evidence of pneumonia.    Left apical 0.4 cm nodule is new compared to 9/16/2023; indeterminate,   but could represent metastasis.    Hepatic lesion/metastasis appear progressed in size compared to 9/16/2023.    --- End of Report ---    < end of copied text >  < from: [facility] Duplex Lower Ext Vein Scan, Left (10.04.23 @ 16:13) >  IMPRESSION:    In addition to the previously identified left soleal vein DVT, there is   now also an acute below the knee DVT affecting a left posterior tibial   vein.      EMILY Ortiz notified    --- End of Report ---    < end of copied text >      Imaging Personally Reviewed:  [x] YES  [ ] NO    Consultant(s) Notes Reviewed:  [x] YES  [ ] NO    MEDICATIONS  (STANDING):  atenolol  Tablet 25 milliGRAM(s) Oral daily  dextrose 5%. 1000 milliLiter(s) (100 mL/Hr) IV Continuous <Continuous>  dextrose 5%. 1000 milliLiter(s) (50 mL/Hr) IV Continuous <Continuous>  dextrose 50% Injectable 25 Gram(s) IV Push once  dextrose 50% Injectable 25 Gram(s) IV Push once  dextrose 50% Injectable 12.5 Gram(s) IV Push once  DULoxetine 20 milliGRAM(s) Oral daily  enoxaparin Injectable 60 milliGRAM(s) SubCutaneous every 12 hours  gabapentin 100 milliGRAM(s) Oral three times a day  glucagon  Injectable 1 milliGRAM(s) IntraMuscular once  insulin lispro (ADMELOG) corrective regimen sliding scale   SubCutaneous three times a day before meals  insulin lispro (ADMELOG) corrective regimen sliding scale   SubCutaneous at bedtime  methadone    Tablet 2.5 milliGRAM(s) Oral every 12 hours  pantoprazole    Tablet 40 milliGRAM(s) Oral two times a day  senna 2 Tablet(s) Oral at bedtime    MEDICATIONS  (PRN):  dextrose Oral Gel 15 Gram(s) Oral once PRN Blood Glucose LESS THAN 70 milliGRAM(s)/deciliter  oxyCODONE    IR 2.5 milliGRAM(s) Oral every 4 hours PRN Moderate Pain (4 - 6)  oxyCODONE    IR 5 milliGRAM(s) Oral every 4 hours PRN Severe Pain (7 - 10)  polyethylene glycol 3350 17 Gram(s) Oral daily PRN Constipation      Care Discussed with Consultants/Other Providers [x] YES  [ ] NO    Vital Signs Last 24 Hrs  T(C): 36.5 (05 Oct 2023 04:22), Max: 36.7 (04 Oct 2023 12:05)  T(F): 97.7 (05 Oct 2023 04:22), Max: 98.1 (04 Oct 2023 21:12)  HR: 73 (05 Oct 2023 04:22) (68 - 73)  BP: 174/82 (05 Oct 2023 04:22) (154/73 - 174/82)  BP(mean): --  RR: 18 (05 Oct 2023 04:22) (17 - 18)  SpO2: 95% (05 Oct 2023 04:22) (94% - 97%)    Parameters below as of 05 Oct 2023 04:22  Patient On (Oxygen Delivery Method): room air      I&O's Summary    04 Oct 2023 07:01  -  05 Oct 2023 07:00  --------------------------------------------------------  IN: 240 mL / OUT: 950 mL / NET: -710 mL        PHYSICAL EXAM:  GENERAL: NAD, AAOx3. Frail  HEAD:  Atraumatic, Normocephalic  EYES: EOMI; conjunctiva and sclera clear  NECK: Supple, No JVD, No LAD  CHEST/LUNG: Diminished; No wheezes, rales or rhonci   HEART: Regular rate and rhythm; No murmurs, rubs, or gallops  ABDOMEN: Soft, Nontender, Nondistended; Bowel sounds present. + drains  EXTREMITIES:  2+ Peripheral Pulses, No clubbing, cyanosis, or edema  SKIN: No rashes or lesions  
[facility] DIVISION OF KIDNEY DISEASES AND HYPERTENSION   FOLLOW UP NOTE    --------------------------------------------------------------------------------  Chief Complaint:    24 hour events/subjective: Pt. was seen and examined today. No acute events.       PAST HISTORY  --------------------------------------------------------------------------------  No significant changes to PMH, PSH, FHx, SHx, unless otherwise noted    ALLERGIES & MEDICATIONS  --------------------------------------------------------------------------------  Allergies    No Known Allergies    Intolerances      Standing Inpatient Medications  atenolol  Tablet 25 milliGRAM(s) Oral daily  chlorhexidine 2% Cloths 1 Application(s) Topical <User Schedule>  dextrose 5%. 1000 milliLiter(s) IV Continuous <Continuous>  dextrose 5%. 1000 milliLiter(s) IV Continuous <Continuous>  dextrose 50% Injectable 25 Gram(s) IV Push once  dextrose 50% Injectable 25 Gram(s) IV Push once  dextrose 50% Injectable 12.5 Gram(s) IV Push once  DULoxetine 20 milliGRAM(s) Oral daily  enoxaparin Injectable 60 milliGRAM(s) SubCutaneous every 12 hours  gabapentin 100 milliGRAM(s) Oral three times a day  glucagon  Injectable 1 milliGRAM(s) IntraMuscular once  insulin lispro (ADMELOG) corrective regimen sliding scale   SubCutaneous three times a day before meals  insulin lispro (ADMELOG) corrective regimen sliding scale   SubCutaneous at bedtime  methadone    Tablet 2.5 milliGRAM(s) Oral every 12 hours  pantoprazole    Tablet 40 milliGRAM(s) Oral two times a day  senna 2 Tablet(s) Oral at bedtime    PRN Inpatient Medications  dextrose Oral Gel 15 Gram(s) Oral once PRN  oxyCODONE    IR 5 milliGRAM(s) Oral every 4 hours PRN  oxyCODONE    IR 2.5 milliGRAM(s) Oral every 4 hours PRN  polyethylene glycol 3350 17 Gram(s) Oral daily PRN      REVIEW OF SYSTEMS  --------------------------------------------------------------------------------  as per hpi    All other systems were reviewed and are negative, except as noted.    VITALS/PHYSICAL EXAM  --------------------------------------------------------------------------------  T(C): 36.6 (10-05-23 @ 11:36), Max: 36.7 (10-04-23 @ 21:12)  HR: 67 (10-05-23 @ 11:36) (65 - 73)  BP: 147/67 (10-05-23 @ 11:36) (147/67 - 174/82)  RR: 18 (10-05-23 @ 11:36) (18 - 18)  SpO2: 96% (10-05-23 @ 11:36) (94% - 96%)  Wt(kg): --  Height (cm): 157.5 (10-03-23 @ 18:53)  Weight (kg): 58.8 (10-04-23 @ 21:33)  BMI (kg/m2): 23.7 (10-04-23 @ 21:33)  BSA (m2): 1.59 (10-04-23 @ 21:33)      10-04-23 @ 07:01  -  10-05-23 @ 07:00  --------------------------------------------------------  IN: 240 mL / OUT: 950 mL / NET: -710 mL        Physical Exam:  	Gen: NAD, lethargic  	HEENT: Anicteric, dry mucus membranes   	Pulm: CTA B/L  	CV: S1S2+  	Abd: Soft, +BS    	Ext: No LE edema B/L  	Neuro: Awake, no FND  	Skin: Warm and dry        LABS/STUDIES  --------------------------------------------------------------------------------              9.5    18.34 >-----------<  251      [10-05-23 @ 05:59]              28.3     135  |  100  |  22  ----------------------------<  104      [10-05-23 @ 05:59]  3.4   |  24  |  0.72        Ca     11.9     [10-05-23 @ 05:59]      iCa    1.72     [10-05 @ 05:52]      Mg     1.6     [10-05-23 @ 05:59]      Phos  2.2     [10-05-23 @ 05:59]    TPro  6.2  /  Alb  3.0  /  TBili  2.0  /  DBili  x   /  AST  48  /  ALT  73  /  AlkPhos  271  [10-04-23 @ 07:02]    PT/INR: PT 15.3 , INR 1.40       [10-03-23 @ 21:13]  PTT: 25.5       [10-03-23 @ 21:13]      Creatinine Trend:  SCr 0.72 [10-05 @ 05:59]  SCr 0.74 [10-05 @ 01:25]  SCr 0.70 [10-04 @ 18:12]  SCr 0.74 [10-04 @ 12:55]  SCr 0.73 [10-04 @ 07:02]    Urinalysis - [10-05-23 @ 05:59]      Color  / Appearance  / SG  / pH       Gluc 104 / Ketone   / Bili  / Urobili        Blood  / Protein  / Leuk Est  / Nitrite       RBC  / WBC  / Hyaline  / Gran  / Sq Epi  / Non Sq Epi  / Bacteria       HCV 0.07, Nonreact      [10-04-23 @ 07:02]      Tacrolimus  Cyclosporine  Sirolimus  Mycophenolate  BK PCR  CMV PCR  Parvo PCR  EBV PCR
SUBJECTIVE AND OBJECTIVE: Pt seen and examined at bedside. Patient working with PT, intermittently Lottie Sanchez, RNonfused, minimally able to participate in exam  Indication for [facility] Pall[facility] Services/INTERVAL HPI: GOC/symptoms     OVERNIGHT EVENTS: no acute events o/n in 24 hours patient used 1x 2.5mg PO oxycodone     DNR on chart:DNI  DNI      Allergies    No Known Allergies    Intolerances    MEDICATIONS  (STANDING):  atenolol  Tablet 25 milliGRAM(s) Oral daily  chlorhexidine 2% Cloths 1 Application(s) Topical <User Schedule>  dextrose 5%. 1000 milliLiter(s) (100 mL/Hr) IV Continuous <Continuous>  dextrose 5%. 1000 milliLiter(s) (50 mL/Hr) IV Continuous <Continuous>  dextrose 50% Injectable 25 Gram(s) IV Push once  dextrose 50% Injectable 25 Gram(s) IV Push once  dextrose 50% Injectable 12.5 Gram(s) IV Push once  DULoxetine 20 milliGRAM(s) Oral daily  enoxaparin Injectable 60 milliGRAM(s) SubCutaneous every 12 hours  gabapentin 100 milliGRAM(s) Oral three times a day  glucagon  Injectable 1 milliGRAM(s) IntraMuscular once  insulin lispro (ADMELOG) corrective regimen sliding scale   SubCutaneous three times a day before meals  insulin lispro (ADMELOG) corrective regimen sliding scale   SubCutaneous at bedtime  methadone    Tablet 2.5 milliGRAM(s) Oral every 12 hours  pantoprazole    Tablet 40 milliGRAM(s) Oral two times a day  senna 2 Tablet(s) Oral at bedtime    MEDICATIONS  (PRN):  dextrose Oral Gel 15 Gram(s) Oral once PRN Blood Glucose LESS THAN 70 milliGRAM(s)/deciliter  oxyCODONE    IR 2.5 milliGRAM(s) Oral every 4 hours PRN Moderate Pain (4 - 6)  oxyCODONE    IR 5 milliGRAM(s) Oral every 4 hours PRN Severe Pain (7 - 10)  polyethylene glycol 3350 17 Gram(s) Oral daily PRN Constipation      ITEMS UNCHECKED ARE NOT PRESENT    PRESENT SYMPTOMS: [ ]Unable to self-report - see [ ] CPOT [ ] PAINADS [ ] RDOS  Source if other than patient:  [ ]Family   [ ]Team     Pain:  [x]yes [ ]no  QOL impact -   Location -   abdominal pain                  Aggravating factors -  Quality -  Radiation -  Timing-  Severity (0-10 scale): 10  Minimal acceptable level (0-10 scale):     CPOT:    https://www.[facility].org/getattachment/[email or web address]-2h0f-[email or web address]/Critical-Care-Pain-Observation-Tool-(CPOT)    PAINAD Score: See PAINAD tool and score below       Dyspnea:                           [ ]Mild [ ]Moderate [ ]Severe    RDOS: See RDOS tool and score below   0 to 2  minimal or no respiratory distress   3  mild distress  4 to 6 moderate distress  >7 severe distress      Anxiety:                             [ ]Mild [ ]Moderate [ ]Severe  Fatigue:                             [ ]Mild [ ]Moderate [ ]Severe  Nausea:                             [ ]Mild [ ]Moderate [ ]Severe  Loss of appetite:              [ ]Mild [ ]Moderate [ ]Severe  Constipation:                    [ ]Mild [ ]Moderate [ ]Severe    PCSSQ[Palliative Care Spiritual Screening Question]   Severity (0-10):  Score of 4 or > indicate consideration of Chaplaincy referral.  Chaplaincy Referral: [ ] yes [x ] refused [ ] following [ ] Deferred     Caregiver [place]? : [ x] yes [ ] no [ ] Deferred [ ] Declined             Social work referral [x ] Patient & Family Centered Care Referral [ ]     Anticipatory Grief present?:  [x ] yes [ ] no  [ ] Deferred                  Social work referral [x ] Chaplaincy Referral [ ]    		  Other Symptoms:  [ ]All other review of systems negative     Palliative Performance Status Version 2:   See PPSv2 tool and score below         PHYSICAL EXAM:  Vital Signs Last 24 Hrs  T(C): 36.6 (05 Oct 2023 11:36), Max: 36.7 (04 Oct 2023 21:12)  T(F): 97.9 (05 Oct 2023 11:36), Max: 98.1 (04 Oct 2023 21:12)  HR: 67 (05 Oct 2023 11:36) (65 - 73)  BP: 147/67 (05 Oct 2023 11:36) (147/67 - 174/82)  BP(mean): --  RR: 18 (05 Oct 2023 11:36) (18 - 18)  SpO2: 96% (05 Oct 2023 11:36) (94% - 96%)    Parameters below as of 05 Oct 2023 11:36  Patient On (Oxygen Delivery Method): room air     I&O's Summary    04 Oct 2023 07:01  -  05 Oct 2023 07:00  --------------------------------------------------------  IN: 240 mL / OUT: 950 mL / NET: -710 mL       GENERAL: [ ]Cachexia    [x ]Alert  [x ]Oriented x2   [ ]Lethargic  [ ]Unarousable  [ ]Verbal  [ ]Non-Verbal  Behavioral:   [ ]Anxiety  [ ]Delirium [ ]Agitation [ ]Other  HEENT:  [ ]Normal   [x ]Dry mouth   [ ]ET Tube/Trach  [ ]Oral lesions  PULMONARY:   [ ]Clear [ ]Tachypnea  [ ]Audible excessive secretions   [ ]Rhonchi        [ ]Right [ ]Left [ ]Bilateral  [ ]Crackles        [ ]Right [ ]Left [ ]Bilateral  [ ]Wheezing     [ ]Right [ ]Left [ ]Bilateral  [ x]Diminished BS [ ] Right [ ]Left [ x]Bilateral  CARDIOVASCULAR:    [x ]Regular [ ]Irregular [ ]Tachy  [ ]Da [ ]Murmur [ ]Other  GASTROINTESTINAL:  [xSoft  [ ]Distended   [x ]+BS  []Non tender [ x]Tender  [ ]Other [ ]PEG [ ]OGT/ NGT   Last BM:   GENITOURINARY:  [ ]Normal [x ]Incontinent   [ ]Oliguria/Anuria   [ ]Miller  MUSCULOSKELETAL:   [ ]Normal   [ x]Weakness  [ x]Bed/Wheelchair bound [ ]Edema  NEUROLOGIC:   [ ]No focal deficits  [x ] Cognitive impairment  [ ] Dysphagia [ ]Dysarthria [ ] Paresis [ ]Other   SKIN:   [ ]Normal  [ ]Rash  [ ]Other  [x ]Pressure ulcer(s) [x ]y [ ]n present on admission    CRITICAL CARE:  [ ]Shock Present  [ ]Septic [ ]Cardiogenic [ ]Neurologic [ ]Hypovolemic  [ ]Vasopressors [ ]Inotropes  [ ]Respiratory failure present [ ]Mechanical Ventilation [ ]Non-invasive ventilatory support [ ]High-Flow   [ ]Acute  [ ]Chronic [ ]Hypoxic  [ ]Hypercarbic [ ]Other  [ ]Other organ failure     LABS:                        9.5    18.34 )-----------( 251      ( 05 Oct 2023 05:59 )             28.3   10-05    135  |  100  |  22  ----------------------------<  104<H>  3.4<L>   |  24  |  0.72    Ca    11.9<H>      05 Oct 2023 05:59  Phos  2.2     10-05  Mg     1.6     10-05    TPro  6.2  /  Alb  3.0<L>  /  TBili  2.0<H>  /  DBili  x   /  AST  48<H>  /  ALT  73<H>  /  AlkPhos  271<H>  10-04  PT/INR - ( 03 Oct 2023 21:13 )   PT: 15.3 sec;   INR: 1.40 ratio         PTT - ( 03 Oct 2023 21:13 )  PTT:25.5 sec    Urinalysis Basic - ( 05 Oct 2023 05:59 )    Color: x / Appearance: x / SG: x / pH: x  Gluc: 104 mg/dL / Ketone: x  / Bili: x / Urobili: x   Blood: x / Protein: x / Nitrite: x   Leuk Esterase: x / RBC: x / WBC x   Sq Epi: x / Non Sq Epi: x / Bacteria: x      RADIOLOGY & ADDITIONAL STUDIES:  < from: CT Chest No Cont (10.04.23 @ 11:02) >    ACC: [id number] EXAM:  CT CHEST   ORDERED BY:  DANA FRANCO     PROCEDURE DATE:  10/04/2023          INTERPRETATION:  HISTORY: Right lower lobe opacity on chest radiograph   dated 10/3/2023. Rule out pneumonia.    EXAMINATION: CT CHEST was performed without IV contrast.    COMPARISON: 9/16/2023 CT chest.    FINDINGS:    AIRWAYS, LUNGS, PLEURA: Clear central airways. Linear right lower lobe   subsegmental atelectasis. No focal consolidation. Left upper lobe 0.4 cm   nodule (image 28, series 2) is new. No pleural effusion.    MEDIASTINUM: Normal heart size. No pericardial effusion. Thoracic aorta   normal caliber.  No large mediastinal lymph nodes.    IMAGED ABDOMEN: Many hepatic hypodense lesions are identified, but are   not well assessed without contrast. With respect to 9/16/2023, lesions   appear to be new and increased in size. Unchanged left adrenal lesion.   Biliary stent in place. Pancreatic head mass partially imaged. Unchanged   splenic lesion.    SOFT TISSUES: Unremarkable.    BONES: Unremarkable.      IMPRESSION:.    No evidence of pneumonia.    Left apical 0.4 cm nodule is new compared to 9/16/2023; indeterminate,   but could represent metastasis.    Hepatic lesion/metastasis appear progressed in size compared to 9/16/2023.    --- End of Report ---             CADY CURRAN MD; Attending Radiologist  This document has been electronically signed. Oct  4 2023 11:13AM    < end of copied text >    Protein Calorie Malnutrition Present: [ ]mild [ ]moderate [ ]severe [ ]underweight [ ]morbid obesity  https://www.andeal.org/vault/2440/web/files/ONC/Table_Clinical%20Characteristics%20to%20Document%20Malnutrition-White%20JV%20et%20al%202012.pdf    Height (cm): 157.5 (10-03-23 @ 18:53), 157.5 (09-25-23 @ 08:45), 157.5 (09-05-23 @ 14:10)  Weight (kg): 58.8 (10-04-23 @ 21:33), 59 (09-29-23 @ 15:04), 56.7 (09-25-23 @ 08:45)  BMI (kg/m2): 23.7 (10-04-23 @ 21:33), 23.8 (10-03-23 @ 18:53), 23.8 (09-29-23 @ 15:04)    [ ]PPSV2 < or = 30%  [ ]significant weight loss [ ]poor nutritional intake [ ]anasarca[ ]Artificial Nutrition    Other REFERRALS:  [ ]Hospice  [ ]Child Life  [ ]Social Work  [ ]Case management [ ]Holistic Therapy     Goals of Care Document:
SUBJECTIVE AND OBJECTIVE: Pt seen and examined at bedside. Pt awake, able to make basic needs known and participate in exam.   Indication for Geriatrics and Palliative Care Services/INTERVAL HPI: GOC     OVERNIGHT EVENTS:No acute events o/n. In 24 hours patient used 2x 5mg PO oxycodone (8a-8a).     DNR on chart:DNI  DNI      Allergies    No Known Allergies    Intolerances    MEDICATIONS  (STANDING):  atenolol  Tablet 25 milliGRAM(s) Oral daily  chlorhexidine 2% Cloths 1 Application(s) Topical <User Schedule>  DULoxetine 20 milliGRAM(s) Oral daily  gabapentin 100 milliGRAM(s) Oral three times a day  glucagon  Injectable 1 milliGRAM(s) IntraMuscular once  methadone    Tablet 2.5 milliGRAM(s) Oral every 8 hours  pantoprazole    Tablet 40 milliGRAM(s) Oral two times a day  senna 2 Tablet(s) Oral at bedtime    MEDICATIONS  (PRN):  ALPRAZolam 0.25 milliGRAM(s) Oral every 6 hours PRN Anxiety  oxyCODONE    IR 2.5 milliGRAM(s) Oral every 4 hours PRN Moderate Pain (4 - 6)  oxyCODONE    IR 5 milliGRAM(s) Oral every 4 hours PRN Severe Pain (7 - 10)  polyethylene glycol 3350 17 Gram(s) Oral daily PRN Constipation      ITEMS UNCHECKED ARE NOT PRESENT    PRESENT SYMPTOMS: [ ]Unable to self-report - see [ ] CPOT [ ] PAINADS [ ] RDOS  Source if other than patient:  [ ]Family   [ ]Team     Pain:  [x ]yes [ ]no- none at time of exam. when pain present 10/10 abdominal pain   QOL impact -   Location -                    Aggravating factors -  Quality -  Radiation -  Timing-  Severity (0-10 scale):  Minimal acceptable level (0-10 scale):     CPOT:    https://www.[facility].org/getattachment/[email or web address]-2762e9868x3s/Critical-Care-Pain-Observation-Tool-(CPOT)    PAINAD Score: See PAINAD tool and score below       Dyspnea:                           [ ]Mild [ ]Moderate [ ]Severe    RDOS: See RDOS tool and score below   0 to 2  minimal or no respiratory distress   3  mild distress  4 to 6 moderate distress  >7 severe distress      Anxiety:                             [ ]Mild [x ]Moderate [ ]Severe  Fatigue:                             [ ]Mild [ ]Moderate [ ]Severe  Nausea:                             [ ]Mild [ ]Moderate [ ]Severe  Loss of appetite:              [ ]Mild [ ]Moderate [ ]Severe  Constipation:                    [ ]Mild [ ]Moderate [ ]Severe    PCSSQ[Palliative Care Spiritual Screening Question]   Severity (0-10):  Score of 4 or > indicate consideration of Chaplaincy referral.  Chaplaincy Referral: [ ] yes [ ] refused [ ] following [x ] Deferred     Caregiver [place]? : [ x] yes [ ] no [ ] Deferred [ ] Declined             Social work referral [x ] Patient & Family Centered Care Referral [ ]     Anticipatory Grief present?:  [x ] yes [ ] no  [ ] Deferred                  Social work referral [x ] Chaplaincy Referral [ ]    		  Other Symptoms:  [x ]All other review of systems negative     Palliative Performance Status Version 2:   See PPSv2 tool and score below         PHYSICAL EXAM:  Vital Signs Last 24 Hrs  T(C): 37.1 (06 Oct 2023 11:45), Max: 37.1 (06 Oct 2023 11:45)  T(F): 98.8 (06 Oct 2023 11:45), Max: 98.8 (06 Oct 2023 11:45)  HR: 57 (06 Oct 2023 11:45) (57 - 71)  BP: 163/70 (06 Oct 2023 11:45) (138/65 - 163/70)  BP(mean): --  RR: 18 (06 Oct 2023 11:45) (18 - 18)  SpO2: 95% (06 Oct 2023 11:45) (95% - 95%)    Parameters below as of 06 Oct 2023 11:45  Patient On (Oxygen Delivery Method): room air     I&O's Summary    05 Oct 2023 07:01  -  06 Oct 2023 07:00  --------------------------------------------------------  IN: 0 mL / OUT: 685 mL / NET: -685 mL       GENERAL: [ ]Cachexia    [x ]Alert  [ x]Oriented x2   [ ]Lethargic  [ ]Unarousable  [ ]Verbal  [ ]Non-Verbal  Behavioral:   [x ]Anxiety  [ ]Delirium [ ]Agitation [ ]Other  HEENT:  [x ]Normal   [ ]Dry mouth   [ ]ET Tube/Trach  [ ]Oral lesions  PULMONARY:   [ ]Clear [ ]Tachypnea  [ ]Audible excessive secretions   [ ]Rhonchi        [ ]Right [ ]Left [ ]Bilateral  [ ]Crackles        [ ]Right [ ]Left [ ]Bilateral  [ ]Wheezing     [ ]Right [ ]Left [ ]Bilateral  [ x]Diminished BS [ ] Right [ ]Left [x ]Bilateral  CARDIOVASCULAR:    [ x]Regular [ ]Irregular [ ]Tachy  [ ]Da [ ]Murmur [ ]Other  GASTROINTESTINAL:  [x ]Soft  [ ]Distended   [x ]+BS  [ ]Non tender [x ]Tender  [ ]Other [ ]PEG [ ]OGT/ NGT   Last BM: 10/6  GENITOURINARY:  [ x]Normal [ ]Incontinent   [ ]Oliguria/Anuria   [ ]Miller  MUSCULOSKELETAL:   [ ]Normal   [x ]Weakness  [ ]Bed/Wheelchair bound [ ]Edema  NEUROLOGIC:   [ ]No focal deficits  [ ] Cognitive impairment  [ ] Dysphagia [ ]Dysarthria [ ] Paresis [ ]Other   SKIN:   [ ]Normal  [ ]Rash  [ ]Other  [ x]Pressure ulcer(s) [x ]y [ ]n present on admission    CRITICAL CARE:  [ ]Shock Present  [ ]Septic [ ]Cardiogenic [ ]Neurologic [ ]Hypovolemic  [ ]Vasopressors [ ]Inotropes  [ ]Respiratory failure present [ ]Mechanical Ventilation [ ]Non-invasive ventilatory support [ ]High-Flow   [ ]Acute  [ ]Chronic [ ]Hypoxic  [ ]Hypercarbic [ ]Other  [ ]Other organ failure     LABS:                        9.5    18.34 )-----------( 251      ( 05 Oct 2023 05:59 )             28.3   10-05    135  |  100  |  22  ----------------------------<  104<H>  3.4<L>   |  24  |  0.72    Ca    11.9<H>      05 Oct 2023 05:59  Phos  2.2     10-05  Mg     1.6     10-05        Urinalysis Basic - ( 05 Oct 2023 05:59 )    Color: x / Appearance: x / SG: x / pH: x  Gluc: 104 mg/dL / Ketone: x  / Bili: x / Urobili: x   Blood: x / Protein: x / Nitrite: x   Leuk Esterase: x / RBC: x / WBC x   Sq Epi: x / Non Sq Epi: x / Bacteria: x      RADIOLOGY & ADDITIONAL STUDIES:  < from: CT Chest No Cont (10.04.23 @ 11:02) >    ACC: [id number] EXAM:  CT CHEST   ORDERED BY:  DANA FRANCO     PROCEDURE DATE:  10/04/2023          INTERPRETATION:  HISTORY: Right lower lobe opacity on chest radiograph   dated 10/3/2023. Rule out pneumonia.    EXAMINATION: CT CHEST was performed without IV contrast.    COMPARISON: 9/16/2023 CT chest.    FINDINGS:    AIRWAYS, LUNGS, PLEURA: Clear central airways. Linear right lower lobe   subsegmental atelectasis. No focal consolidation. Left upper lobe 0.4 cm   nodule (image 28, series 2) is new. No pleural effusion.    MEDIASTINUM: Normal heart size. No pericardial effusion. Thoracic aorta   normal caliber.  No large mediastinal lymph nodes.    IMAGED ABDOMEN: Many hepatic hypodense lesions are identified, but are   not well assessed without contrast. With respect to 9/16/2023, lesions   appear to be new and increased in size. Unchanged left adrenal lesion.   Biliary stent in place. Pancreatic head mass partially imaged. Unchanged   splenic lesion.    SOFT TISSUES: Unremarkable.    BONES: Unremarkable.      IMPRESSION:.    No evidence of pneumonia.    Left apical 0.4 cm nodule is new compared to 9/16/2023; indeterminate,   but could represent metastasis.    Hepatic lesion/metastasis appear progressed in size compared to 9/16/2023.    --- End of Report ---             CADY CURRAN MD; Attending Radiologist  This document has been electronically signed. Oct  4 2023 11:13AM    < end of copied text >    Protein Calorie Malnutrition Present: [ ]mild [ ]moderate [ ]severe [ ]underweight [ ]morbid obesity  https://www.andeal.org/vault/2440/web/files/ONC/Table_Clinical%20Characteristics%20to%20Document%20Malnutrition-White%20JV%20et%20al%202012.pdf    Height (cm): 157.5 (10-03-23 @ 18:53), 157.5 (09-25-23 @ 08:45), 157.5 (09-05-23 @ 14:10)  Weight (kg): 58.8 (10-04-23 @ 21:33), 59 (09-29-23 @ 15:04), 56.7 (09-25-23 @ 08:45)  BMI (kg/m2): 23.7 (10-04-23 @ 21:33), 23.8 (10-03-23 @ 18:53), 23.8 (09-29-23 @ 15:04)    [ ]PPSV2 < or = 30%  [ ]significant weight loss [ ]poor nutritional intake [ ]anasarca[ ]Artificial Nutrition    Other REFERRALS:  [ ]Hospice  [ ]Child Life  [ ]Social Work  [ ]Case management [ ]Holistic Therapy     Goals of Care Document:

## 2023-10-06 NOTE — PROGRESS NOTE ADULT - PROBLEM SELECTOR PLAN 2
8a-8a patient used 1x 2.5mg PO Oxycodone  c/w Methadone 2.5mg BID   c/w oxycodone 2.5mg PO Solution q4 hours prn for moderate pain (hold sbp<90, RR<10, obtundation)   c/w oxycodone 5mg PO solution q4 hours prn for severe pain (hold sbp<90, RR<10, obtundation)   bowel regimen while on opioids 8a-8a patient used 2x 5mg PO Oxycodone  repeated ECG today  increased methadone to 2.5mg TID as pt has difficulty requesting prn pain medications    c/w oxycodone 2.5mg PO Solution q4 hours prn for moderate pain (hold sbp<90, RR<10, obtundation)   c/w oxycodone 5mg PO solution q4 hours prn for severe pain (hold sbp<90, RR<10, obtundation)   bowel regimen while on opioids

## 2023-10-06 NOTE — PROGRESS NOTE ADULT - PROBLEM SELECTOR PLAN 5
will continue to follow for symptom management  please page palliative care if symptoms unmanaged   case discussed with primary team PA and    referral placed for hospice  Can be reached by TEAMS M-F 9-5 Lottie Sanchez Any other time please page [phone] if needed see GO note above:   DNR/I  surrogate is Marlo Thao  dispo is home hospice today

## 2023-10-06 NOTE — DISCHARGE NOTE PROVIDER - NSDCCPCAREPLAN_GEN_ALL_CORE_FT
PRINCIPAL DISCHARGE DIAGNOSIS  Diagnosis: Hypercalcemia  Assessment and Plan of Treatment: S/p IVR and      SECONDARY DISCHARGE DIAGNOSES  Diagnosis: Pancreatic cancer  Assessment and Plan of Treatment: FFollow up with palliatiive    Diagnosis: Transaminitis  Assessment and Plan of Treatment: Stable

## 2023-10-06 NOTE — PROGRESS NOTE ADULT - PROBLEM SELECTOR PLAN 4
see GOC note above:   DNR/I  surrogate is Marlo Thao  dispo is home hospice continued medical management while in the hospital: no blood draws, fs, injections, esclation of care. family would like to continue PO medications and IV hydration while patient is pending discharge home following GOC, no escalation of care, no further DMT

## 2023-10-06 NOTE — PROGRESS NOTE ADULT - CONVERSATION DETAILS
Met with patient's family at bedside. Discussed plan of care which remains for discharge to home hospice. Global referral placed and confirmed by CM. Discussed possible transfer to PCU pending discharge for continued symptom management. Family is in agreement to transfer to PCU with the understanding this is a temporary unit as she awaits DME to be delivered home and for hospice care to be established. Patient and family would like to continue PO medications as tolerated but would like to discontinue fs, blood draws, and injection medications. Sari was more alert today and confirmed above wishes. She expressed feeling anxious regarding the situation as she feels this has all happened very quickly. She is in agreement for a symptom directed approach to care at this time.  They verbalized understanding that there will be no escalation of care in PCU and it will be a focus on symptom management. Emotional support provided throughout. Met with patient's family at bedside. Discussed plan of care which remains for discharge to home hospice. Global referral placed and confirmed by CM. Discussed possible transfer to PCU pending discharge for continued symptom management. Family is in agreement to transfer to PCU with the understanding this is a temporary unit as she awaits DME to be delivered home and for hospice care to be established. Patient and family would like to continue PO medications as tolerated but would like to discontinue fs, blood draws, and injection medications. Sari was more alert today and confirmed above wishes. She expressed feeling anxious regarding the situation as she feels this has all happened very quickly. She is in agreement for a symptom directed approach to care at this time.  They verbalized understanding that there will be no escalation of care in PCU and it will be a focus on symptom management. Emotional support provided throughout.    Following received call from family plan is for d/c today to home hospice.

## 2023-10-29 PROBLEM — G89.3 CANCER-RELATED PAIN: Status: ACTIVE | Noted: 2023-01-01

## 2023-10-29 PROBLEM — K26.9 DUODENAL ULCER: Status: ACTIVE | Noted: 2023-01-01

## 2023-10-29 PROBLEM — E83.52 HYPERCALCEMIA OF MALIGNANCY: Status: ACTIVE | Noted: 2023-01-01

## 2023-10-29 PROBLEM — C25.9 PANCREATIC ADENOCARCINOMA: Status: ACTIVE | Noted: 2023-01-01
